# Patient Record
Sex: FEMALE | Race: WHITE
[De-identification: names, ages, dates, MRNs, and addresses within clinical notes are randomized per-mention and may not be internally consistent; named-entity substitution may affect disease eponyms.]

---

## 2017-02-21 ENCOUNTER — PRIOR ORIGINAL RECORDS (OUTPATIENT)
Dept: OTHER | Age: 82
End: 2017-02-21

## 2017-03-13 ENCOUNTER — MYAURORA ACCOUNT LINK (OUTPATIENT)
Dept: OTHER | Age: 82
End: 2017-03-13

## 2017-03-13 ENCOUNTER — HOSPITAL ENCOUNTER (OUTPATIENT)
Dept: CARDIOLOGY CLINIC | Facility: HOSPITAL | Age: 82
Discharge: HOME | End: 2017-03-13
Attending: INTERNAL MEDICINE

## 2017-03-13 DIAGNOSIS — I65.23 BILATERAL CAROTID ARTERY STENOSIS: ICD-10-CM

## 2017-06-07 ENCOUNTER — PRIOR ORIGINAL RECORDS (OUTPATIENT)
Dept: OTHER | Age: 82
End: 2017-06-07

## 2017-06-29 LAB
ALBUMIN: 4.1 G/DL
ALKALINE PHOSPHATATE(ALK PHOS): 80 IU/L
BILIRUBIN TOTAL: 0.42 MG/DL
BUN: 18 MG/DL
CALCIUM: 9.2 MG/DL
CHLORIDE: 106 MEQ/L
CHOLESTEROL, TOTAL: 186 MG/DL
CREATININE, SERUM: 0.87 MG/DL
FREE T4: 1.16 MG/DL
FREE T4: 1.16 MG/DL
GLUCOSE: 88 MG/DL
HDL CHOLESTEROL: 59 MG/DL
HEMATOCRIT: 46.5 %
HEMOGLOBIN A1C: 5.5 %
HEMOGLOBIN: 15.1 G/DL
LDL CHOLESTEROL: 111 MG/DL
PLATELETS: 229 K/UL
POTASSIUM, SERUM: 4.6 MEQ/L
PROTEIN, TOTAL: 7.1 G/DL
RED BLOOD COUNT: 5.42 X 10-6/U
SGOT (AST): 13 IU/L
SGPT (ALT): 28 IU/L
SODIUM: 145 MEQ/L
THYROID STIMULATING HORMONE: 2.45 MLU/L
TRIGLYCERIDES: 79 MG/DL
VITAMIN D 25-OH: 39.65 NG/ML
WHITE BLOOD COUNT: 4.87 X 10-3/U

## 2017-08-28 ENCOUNTER — HOSPITAL ENCOUNTER (EMERGENCY)
Facility: HOSPITAL | Age: 82
Discharge: HOME | End: 2017-08-28
Attending: EMERGENCY MEDICINE
Payer: MEDICARE

## 2017-08-28 ENCOUNTER — APPOINTMENT (OUTPATIENT)
Dept: GENERAL RADIOLOGY | Facility: HOSPITAL | Age: 82
End: 2017-08-28
Attending: EMERGENCY MEDICINE
Payer: MEDICARE

## 2017-08-28 ENCOUNTER — PRIOR ORIGINAL RECORDS (OUTPATIENT)
Dept: OTHER | Age: 82
End: 2017-08-28

## 2017-08-28 VITALS
RESPIRATION RATE: 16 BRPM | SYSTOLIC BLOOD PRESSURE: 119 MMHG | DIASTOLIC BLOOD PRESSURE: 68 MMHG | HEIGHT: 61.5 IN | OXYGEN SATURATION: 97 % | HEART RATE: 70 BPM | BODY MASS INDEX: 27.03 KG/M2 | WEIGHT: 145 LBS | TEMPERATURE: 97 F

## 2017-08-28 DIAGNOSIS — R53.83 FATIGUE, UNSPECIFIED TYPE: Primary | ICD-10-CM

## 2017-08-28 LAB
ALBUMIN SERPL-MCNC: 3.8 G/DL (ref 3.5–4.8)
ALP LIVER SERPL-CCNC: 76 U/L (ref 55–142)
ALT SERPL-CCNC: 25 U/L (ref 14–54)
AST SERPL-CCNC: 9 U/L (ref 15–41)
BASOPHILS # BLD AUTO: 0.06 X10(3) UL (ref 0–0.1)
BASOPHILS NFR BLD AUTO: 0.8 %
BILIRUB SERPL-MCNC: 0.3 MG/DL (ref 0.1–2)
BILIRUB UR QL STRIP.AUTO: NEGATIVE
BUN BLD-MCNC: 16 MG/DL (ref 8–20)
CALCIUM BLD-MCNC: 9.2 MG/DL (ref 8.3–10.3)
CHLORIDE: 109 MMOL/L (ref 101–111)
CLARITY UR REFRACT.AUTO: CLEAR
CO2: 28 MMOL/L (ref 22–32)
COLOR UR AUTO: YELLOW
CREAT BLD-MCNC: 0.73 MG/DL (ref 0.55–1.02)
D-DIMER: 0.46 UG/ML FEU (ref 0–0.49)
EOSINOPHIL # BLD AUTO: 0.23 X10(3) UL (ref 0–0.3)
EOSINOPHIL NFR BLD AUTO: 3.3 %
ERYTHROCYTE [DISTWIDTH] IN BLOOD BY AUTOMATED COUNT: 14.1 % (ref 11.5–16)
GLUCOSE BLD-MCNC: 93 MG/DL (ref 70–99)
GLUCOSE UR STRIP.AUTO-MCNC: NEGATIVE MG/DL
HCT VFR BLD AUTO: 45.2 % (ref 34–50)
HGB BLD-MCNC: 14.6 G/DL (ref 12–16)
IMMATURE GRANULOCYTE COUNT: 0.03 X10(3) UL (ref 0–1)
IMMATURE GRANULOCYTE RATIO %: 0.4 %
KETONES UR STRIP.AUTO-MCNC: NEGATIVE MG/DL
LYMPHOCYTES # BLD AUTO: 1.53 X10(3) UL (ref 0.9–4)
LYMPHOCYTES NFR BLD AUTO: 21.6 %
M PROTEIN MFR SERPL ELPH: 7.3 G/DL (ref 6.1–8.3)
MCH RBC QN AUTO: 27.7 PG (ref 27–33.2)
MCHC RBC AUTO-ENTMCNC: 32.3 G/DL (ref 31–37)
MCV RBC AUTO: 85.6 FL (ref 81–100)
MONOCYTES # BLD AUTO: 0.63 X10(3) UL (ref 0.1–0.6)
MONOCYTES NFR BLD AUTO: 8.9 %
NEUTROPHIL ABS PRELIM: 4.59 X10 (3) UL (ref 1.3–6.7)
NEUTROPHILS # BLD AUTO: 4.59 X10(3) UL (ref 1.3–6.7)
NEUTROPHILS NFR BLD AUTO: 65 %
NITRITE UR QL STRIP.AUTO: NEGATIVE
PH UR STRIP.AUTO: 7 [PH] (ref 4.5–8)
PLATELET # BLD AUTO: 212 10(3)UL (ref 150–450)
POTASSIUM SERPL-SCNC: 4 MMOL/L (ref 3.6–5.1)
PRO-BETA NATRIURETIC PEPTIDE: 112 PG/ML (ref ?–450)
PROT UR STRIP.AUTO-MCNC: NEGATIVE MG/DL
RBC # BLD AUTO: 5.28 X10(6)UL (ref 3.8–5.1)
RBC UR QL AUTO: NEGATIVE
RED CELL DISTRIBUTION WIDTH-SD: 43.5 FL (ref 35.1–46.3)
SODIUM SERPL-SCNC: 142 MMOL/L (ref 136–144)
SP GR UR STRIP.AUTO: 1.01 (ref 1–1.03)
TROPONIN: <0.046 NG/ML (ref ?–0.05)
TSI SER-ACNC: 1.94 MIU/ML (ref 0.35–5.5)
UROBILINOGEN UR STRIP.AUTO-MCNC: <2 MG/DL
WBC # BLD AUTO: 7.1 X10(3) UL (ref 4–13)

## 2017-08-28 PROCEDURE — 93005 ELECTROCARDIOGRAM TRACING: CPT

## 2017-08-28 PROCEDURE — 85025 COMPLETE CBC W/AUTO DIFF WBC: CPT | Performed by: EMERGENCY MEDICINE

## 2017-08-28 PROCEDURE — 81001 URINALYSIS AUTO W/SCOPE: CPT | Performed by: EMERGENCY MEDICINE

## 2017-08-28 PROCEDURE — 80053 COMPREHEN METABOLIC PANEL: CPT | Performed by: EMERGENCY MEDICINE

## 2017-08-28 PROCEDURE — 99285 EMERGENCY DEPT VISIT HI MDM: CPT

## 2017-08-28 PROCEDURE — 71010 XR CHEST AP PORTABLE  (CPT=71010): CPT | Performed by: EMERGENCY MEDICINE

## 2017-08-28 PROCEDURE — 84484 ASSAY OF TROPONIN QUANT: CPT | Performed by: EMERGENCY MEDICINE

## 2017-08-28 PROCEDURE — 85378 FIBRIN DEGRADE SEMIQUANT: CPT | Performed by: EMERGENCY MEDICINE

## 2017-08-28 PROCEDURE — 84443 ASSAY THYROID STIM HORMONE: CPT | Performed by: EMERGENCY MEDICINE

## 2017-08-28 PROCEDURE — 93010 ELECTROCARDIOGRAM REPORT: CPT

## 2017-08-28 PROCEDURE — 96360 HYDRATION IV INFUSION INIT: CPT

## 2017-08-28 PROCEDURE — 83880 ASSAY OF NATRIURETIC PEPTIDE: CPT | Performed by: EMERGENCY MEDICINE

## 2017-08-28 NOTE — ED PROVIDER NOTES
Patient Seen in: [facility] Emergency Department    History   Patient presents with:  Fatigue (constitutional, neurologic)    Stated Complaint: fatigue since Friday    HPI    58-year-old with history of hypertension, high cholesterol, kidney stones, topically 2 (two) times daily as needed. Meclizine HCl (ANTIVERT) 25 MG Oral Tab,  Take 25 mg by mouth 3 (three) times daily as needed.    Atorvastatin Calcium (LIPITOR) 20 MG Oral Tab,  TAKE 1 TABLET BY MOUTH NIGHTLY   Calcium-Vitamin D (CALTRATE 600 PLU calf tenderness to palpation. Neuro: No facial droop. No dysarthria or aphasia. Patient moves all extremities.   Skin: warm and dry, no diaphoresis    ED Course     Labs Reviewed   COMP METABOLIC PANEL (14) - Abnormal; Notable for the following:        R pleural effusion. Heart and pulmonary vessels are normal caliber. Mediastinal contours are smooth. Mild apex left scoliosis. Osseous degenerative changes.  Mild deformities   from old right rib fractures.     =====  CONCLUSION:  Hyperinflation/COPD.      ==

## 2017-08-29 LAB
ATRIAL RATE: 71 BPM
P AXIS: 5 DEGREES
P-R INTERVAL: 146 MS
Q-T INTERVAL: 396 MS
QRS DURATION: 84 MS
QTC CALCULATION (BEZET): 430 MS
R AXIS: -57 DEGREES
T AXIS: 19 DEGREES
VENTRICULAR RATE: 71 BPM

## 2017-09-11 ENCOUNTER — PRIOR ORIGINAL RECORDS (OUTPATIENT)
Dept: OTHER | Age: 82
End: 2017-09-11

## 2017-09-13 ENCOUNTER — PRIOR ORIGINAL RECORDS (OUTPATIENT)
Dept: OTHER | Age: 82
End: 2017-09-13

## 2017-09-14 ENCOUNTER — HOSPITAL ENCOUNTER (OUTPATIENT)
Dept: CV DIAGNOSTICS | Facility: HOSPITAL | Age: 82
Discharge: HOME | End: 2017-09-14
Attending: INTERNAL MEDICINE
Payer: MEDICARE

## 2017-09-14 DIAGNOSIS — R00.2 HEART PALPITATIONS: ICD-10-CM

## 2017-09-14 PROCEDURE — 93226 XTRNL ECG REC<48 HR SCAN A/R: CPT | Performed by: INTERNAL MEDICINE

## 2017-09-14 PROCEDURE — 93225 XTRNL ECG REC<48 HRS REC: CPT | Performed by: INTERNAL MEDICINE

## 2017-09-14 PROCEDURE — 93227 XTRNL ECG REC<48 HR R&I: CPT | Performed by: INTERNAL MEDICINE

## 2017-10-03 ENCOUNTER — MYAURORA ACCOUNT LINK (OUTPATIENT)
Dept: OTHER | Age: 82
End: 2017-10-03

## 2017-10-03 ENCOUNTER — PRIOR ORIGINAL RECORDS (OUTPATIENT)
Dept: OTHER | Age: 82
End: 2017-10-03

## 2017-10-12 LAB
ALBUMIN: 3.8 G/DL
ALKALINE PHOSPHATATE(ALK PHOS): 76 IU/L
BILIRUBIN TOTAL: 0.3 MG/DL
BUN: 16 MG/DL
CALCIUM: 9.2 MG/DL
CHLORIDE: 109 MEQ/L
CREATININE, SERUM: 0.73 MG/DL
GLUCOSE: 93 MG/DL
HEMATOCRIT: 45.2 %
HEMOGLOBIN: 14.6 G/DL
PLATELETS: 212 K/UL
POTASSIUM, SERUM: 4 MEQ/L
PROBNP: 112 PG/ML
PROTEIN, TOTAL: 7.3 G/DL
RED BLOOD COUNT: 5.28 X 10-6/U
SGOT (AST): 9 IU/L
SGPT (ALT): 25 IU/L
SODIUM: 142 MEQ/L
THYROID STIMULATING HORMONE: 1.94 MLU/L
WHITE BLOOD COUNT: 7.1 X 10-3/U

## 2017-10-17 ENCOUNTER — HOSPITAL ENCOUNTER (OUTPATIENT)
Dept: MAMMOGRAPHY | Age: 82
Discharge: HOME | End: 2017-10-17
Attending: INTERNAL MEDICINE
Payer: MEDICARE

## 2017-10-17 DIAGNOSIS — Z12.31 ENCOUNTER FOR SCREENING MAMMOGRAM FOR MALIGNANT NEOPLASM OF BREAST: ICD-10-CM

## 2017-10-17 DIAGNOSIS — Z12.31 ENCOUNTER FOR SCREENING MAMMOGRAM FOR BREAST CANCER: ICD-10-CM

## 2017-10-17 PROCEDURE — 77067 SCR MAMMO BI INCL CAD: CPT | Performed by: INTERNAL MEDICINE

## 2017-10-17 PROCEDURE — 77063 BREAST TOMOSYNTHESIS BI: CPT | Performed by: INTERNAL MEDICINE

## 2017-11-02 ENCOUNTER — HOSPITAL ENCOUNTER (OUTPATIENT)
Dept: CV DIAGNOSTICS | Facility: HOSPITAL | Age: 82
Discharge: HOME | End: 2017-11-02
Attending: INTERNAL MEDICINE

## 2017-11-02 ENCOUNTER — MYAURORA ACCOUNT LINK (OUTPATIENT)
Dept: OTHER | Age: 82
End: 2017-11-02

## 2017-11-02 DIAGNOSIS — R06.00 DYSPNEA, UNSPECIFIED TYPE: ICD-10-CM

## 2018-02-01 PROBLEM — Z78.0 POSTMENOPAUSAL STATUS: Status: ACTIVE | Noted: 2018-02-01

## 2018-06-08 ENCOUNTER — PRIOR ORIGINAL RECORDS (OUTPATIENT)
Dept: OTHER | Age: 83
End: 2018-06-08

## 2018-06-27 PROBLEM — Z79.899 ENCOUNTER FOR LONG-TERM (CURRENT) USE OF MEDICATIONS: Status: ACTIVE | Noted: 2018-06-27

## 2018-07-03 ENCOUNTER — PRIOR ORIGINAL RECORDS (OUTPATIENT)
Dept: OTHER | Age: 83
End: 2018-07-03

## 2018-07-09 ENCOUNTER — PRIOR ORIGINAL RECORDS (OUTPATIENT)
Dept: OTHER | Age: 83
End: 2018-07-09

## 2018-07-19 LAB
ALBUMIN: 3.8 G/DL
ALKALINE PHOSPHATATE(ALK PHOS): 74 IU/L
BILIRUBIN TOTAL: 0.47 MG/DL
BUN: 23 MG/DL
CALCIUM: 8.7 MG/DL
CHLORIDE: 105 MEQ/L
CHOLESTEROL, TOTAL: 194 MG/DL
CREATININE, SERUM: 0.78 MG/DL
FREE T4: 1.22 MG/DL
GLUCOSE: 90 MG/DL
HDL CHOLESTEROL: 58 MG/DL
HEMATOCRIT: 46.4 %
HEMOGLOBIN A1C: 5.6 %
HEMOGLOBIN: 15 G/DL
LDL CHOLESTEROL: 116 MG/DL
PLATELETS: 229 K/UL
POTASSIUM, SERUM: 4.5 MEQ/L
PROTEIN, TOTAL: 6.8 G/DL
RED BLOOD COUNT: 5.36 X 10-6/U
SGOT (AST): 17 IU/L
SGPT (ALT): 26 IU/L
SODIUM: 144 MEQ/L
THYROID STIMULATING HORMONE: 3.42 MLU/L
TRIGLYCERIDES: 102 MG/DL
WHITE BLOOD COUNT: 5.45 X 10-3/U

## 2018-08-14 ENCOUNTER — HOSPITAL ENCOUNTER (OUTPATIENT)
Dept: CV DIAGNOSTICS | Facility: HOSPITAL | Age: 83
Discharge: HOME | End: 2018-08-14
Attending: INTERNAL MEDICINE
Payer: MEDICARE

## 2018-08-14 DIAGNOSIS — R06.00 DYSPNEA, UNSPECIFIED: ICD-10-CM

## 2018-08-14 DIAGNOSIS — R53.83 OTHER FATIGUE: ICD-10-CM

## 2018-08-14 DIAGNOSIS — I65.23 BILATERAL CAROTID ARTERY STENOSIS: ICD-10-CM

## 2018-08-14 PROCEDURE — 93018 CV STRESS TEST I&R ONLY: CPT | Performed by: INTERNAL MEDICINE

## 2018-08-14 PROCEDURE — 93350 STRESS TTE ONLY: CPT | Performed by: INTERNAL MEDICINE

## 2018-08-14 PROCEDURE — 93017 CV STRESS TEST TRACING ONLY: CPT | Performed by: INTERNAL MEDICINE

## 2018-08-26 PROBLEM — M54.2 NECK PAIN: Status: ACTIVE | Noted: 2018-08-26

## 2018-10-18 ENCOUNTER — HOSPITAL ENCOUNTER (OUTPATIENT)
Dept: MAMMOGRAPHY | Age: 83
Discharge: HOME | End: 2018-10-18
Attending: INTERNAL MEDICINE
Payer: MEDICARE

## 2018-10-18 DIAGNOSIS — Z12.39 SCREENING FOR BREAST CANCER: ICD-10-CM

## 2018-10-18 PROCEDURE — 77067 SCR MAMMO BI INCL CAD: CPT | Performed by: INTERNAL MEDICINE

## 2018-10-18 PROCEDURE — 77063 BREAST TOMOSYNTHESIS BI: CPT | Performed by: INTERNAL MEDICINE

## 2019-01-04 ENCOUNTER — HOSPITAL ENCOUNTER (OUTPATIENT)
Dept: LAB | Facility: HOSPITAL | Age: 84
Discharge: HOME | End: 2019-01-04
Attending: INTERNAL MEDICINE
Payer: MEDICARE

## 2019-01-04 ENCOUNTER — PRIOR ORIGINAL RECORDS (OUTPATIENT)
Dept: OTHER | Age: 84
End: 2019-01-04

## 2019-01-04 LAB
ALBUMIN SERPL-MCNC: 3.9 G/DL (ref 3.1–4.5)
ALBUMIN/GLOB SERPL: 1.1 {RATIO} (ref 1–2)
ALP LIVER SERPL-CCNC: 81 U/L (ref 55–142)
ALT SERPL-CCNC: 30 U/L (ref 14–54)
ANION GAP SERPL CALC-SCNC: 4 MMOL/L (ref 0–18)
AST SERPL-CCNC: 15 U/L (ref 15–41)
BILIRUB SERPL-MCNC: 0.6 MG/DL (ref 0.1–2)
BUN BLD-MCNC: 20 MG/DL (ref 8–20)
BUN/CREAT SERPL: 23.3 (ref 10–20)
CALCIUM BLD-MCNC: 8.9 MG/DL (ref 8.3–10.3)
CHLORIDE SERPL-SCNC: 107 MMOL/L (ref 101–111)
CHOLEST SMN-MCNC: 215 MG/DL (ref ?–200)
CO2 SERPL-SCNC: 29 MMOL/L (ref 22–32)
CREAT BLD-MCNC: 0.86 MG/DL (ref 0.55–1.02)
EST. AVERAGE GLUCOSE BLD GHB EST-MCNC: 120 MG/DL (ref 68–126)
GLOBULIN PLAS-MCNC: 3.4 G/DL (ref 2.8–4.4)
GLUCOSE BLD-MCNC: 101 MG/DL (ref 70–99)
HBA1C MFR BLD HPLC: 5.8 % (ref ?–5.7)
HDLC SERPL-MCNC: 59 MG/DL (ref 40–59)
LDLC SERPL CALC-MCNC: 130 MG/DL (ref ?–100)
M PROTEIN MFR SERPL ELPH: 7.3 G/DL (ref 6.4–8.2)
NONHDLC SERPL-MCNC: 156 MG/DL (ref ?–130)
OSMOLALITY SERPL CALC.SUM OF ELEC: 293 MOSM/KG (ref 275–295)
POTASSIUM SERPL-SCNC: 3.8 MMOL/L (ref 3.6–5.1)
SODIUM SERPL-SCNC: 140 MMOL/L (ref 136–144)
T4 FREE SERPL-MCNC: 1 NG/DL (ref 0.9–1.8)
TRIGL SERPL-MCNC: 129 MG/DL (ref 30–149)
TSI SER-ACNC: 3.08 MIU/ML (ref 0.35–5.5)
VLDLC SERPL CALC-MCNC: 26 MG/DL (ref 0–30)

## 2019-01-04 PROCEDURE — 80061 LIPID PANEL: CPT | Performed by: INTERNAL MEDICINE

## 2019-01-04 PROCEDURE — 84439 ASSAY OF FREE THYROXINE: CPT | Performed by: INTERNAL MEDICINE

## 2019-01-04 PROCEDURE — 36415 COLL VENOUS BLD VENIPUNCTURE: CPT | Performed by: INTERNAL MEDICINE

## 2019-01-04 PROCEDURE — 83036 HEMOGLOBIN GLYCOSYLATED A1C: CPT | Performed by: INTERNAL MEDICINE

## 2019-01-04 PROCEDURE — 80053 COMPREHEN METABOLIC PANEL: CPT | Performed by: INTERNAL MEDICINE

## 2019-01-04 PROCEDURE — 84443 ASSAY THYROID STIM HORMONE: CPT | Performed by: INTERNAL MEDICINE

## 2019-01-15 ENCOUNTER — MYAURORA ACCOUNT LINK (OUTPATIENT)
Dept: OTHER | Age: 84
End: 2019-01-15

## 2019-01-15 ENCOUNTER — PRIOR ORIGINAL RECORDS (OUTPATIENT)
Dept: OTHER | Age: 84
End: 2019-01-15

## 2019-02-18 LAB
ALBUMIN: 3.9 G/DL
ALKALINE PHOSPHATATE(ALK PHOS): 81 IU/L
BILIRUBIN TOTAL: 0.6 MG/DL
BUN: 20 MG/DL
CALCIUM: 8.9 MG/DL
CHLORIDE: 107 MEQ/L
CHOLESTEROL, TOTAL: 215 MG/DL
CREATININE, SERUM: 0.86 MG/DL
FREE T4: 1 MG/DL
GLOBULIN: 3.4 G/DL
GLUCOSE: 101 MG/DL
HDL CHOLESTEROL: 59 MG/DL
HEMOGLOBIN A1C: 5.8 %
LDL CHOLESTEROL: 130 MG/DL
POTASSIUM, SERUM: 3.8 MEQ/L
PROTEIN, TOTAL: 7.3 G/DL
SGOT (AST): 15 IU/L
SGPT (ALT): 30 IU/L
SODIUM: 140 MEQ/L
THYROID STIMULATING HORMONE: 3.08 MLU/L
TRIGLYCERIDES: 129 MG/DL

## 2019-02-28 VITALS
DIASTOLIC BLOOD PRESSURE: 58 MMHG | HEART RATE: 74 BPM | HEIGHT: 60 IN | SYSTOLIC BLOOD PRESSURE: 120 MMHG | WEIGHT: 147 LBS | BODY MASS INDEX: 28.86 KG/M2

## 2019-02-28 VITALS
DIASTOLIC BLOOD PRESSURE: 62 MMHG | HEART RATE: 81 BPM | WEIGHT: 146 LBS | SYSTOLIC BLOOD PRESSURE: 112 MMHG | BODY MASS INDEX: 28.66 KG/M2 | HEIGHT: 60 IN

## 2019-02-28 VITALS
SYSTOLIC BLOOD PRESSURE: 120 MMHG | DIASTOLIC BLOOD PRESSURE: 64 MMHG | HEART RATE: 60 BPM | BODY MASS INDEX: 28.27 KG/M2 | HEIGHT: 60 IN | WEIGHT: 144 LBS

## 2019-03-01 VITALS
HEIGHT: 60 IN | HEART RATE: 79 BPM | WEIGHT: 149 LBS | BODY MASS INDEX: 29.25 KG/M2 | DIASTOLIC BLOOD PRESSURE: 65 MMHG | SYSTOLIC BLOOD PRESSURE: 125 MMHG

## 2019-04-08 PROCEDURE — 87209 SMEAR COMPLEX STAIN: CPT | Performed by: INTERNAL MEDICINE

## 2019-04-08 PROCEDURE — 87045 FECES CULTURE AEROBIC BACT: CPT | Performed by: INTERNAL MEDICINE

## 2019-04-08 PROCEDURE — 87177 OVA AND PARASITES SMEARS: CPT | Performed by: INTERNAL MEDICINE

## 2019-04-08 PROCEDURE — 87046 STOOL CULTR AEROBIC BACT EA: CPT | Performed by: INTERNAL MEDICINE

## 2019-04-08 PROCEDURE — 87427 SHIGA-LIKE TOXIN AG IA: CPT | Performed by: INTERNAL MEDICINE

## 2019-04-08 PROCEDURE — 87272 CRYPTOSPORIDIUM AG IF: CPT | Performed by: INTERNAL MEDICINE

## 2019-04-08 PROCEDURE — 87329 GIARDIA AG IA: CPT | Performed by: INTERNAL MEDICINE

## 2019-04-22 RX ORDER — ATORVASTATIN CALCIUM 20 MG/1
TABLET, FILM COATED ORAL
COMMUNITY
Start: 2018-07-03

## 2019-04-22 RX ORDER — ASPIRIN 325 MG
TABLET ORAL
COMMUNITY

## 2019-04-22 RX ORDER — MULTIVITAMIN
CAPSULE ORAL
COMMUNITY

## 2019-04-22 RX ORDER — LEVOTHYROXINE SODIUM 0.05 MG/1
TABLET ORAL
COMMUNITY
Start: 2010-02-25

## 2019-07-01 PROBLEM — Z78.0 POSTMENOPAUSAL STATUS: Status: RESOLVED | Noted: 2018-02-01 | Resolved: 2019-07-01

## 2019-07-01 PROBLEM — E03.9 HYPOTHYROIDISM: Status: ACTIVE | Noted: 2019-07-01

## 2019-10-23 ENCOUNTER — HOSPITAL ENCOUNTER (OUTPATIENT)
Dept: MAMMOGRAPHY | Facility: HOSPITAL | Age: 84
Discharge: HOME | End: 2019-10-23
Attending: INTERNAL MEDICINE
Payer: MEDICARE

## 2019-10-23 DIAGNOSIS — Z12.39 SCREENING FOR BREAST CANCER: ICD-10-CM

## 2019-10-23 PROCEDURE — 77063 BREAST TOMOSYNTHESIS BI: CPT | Performed by: INTERNAL MEDICINE

## 2019-10-23 PROCEDURE — 77067 SCR MAMMO BI INCL CAD: CPT | Performed by: INTERNAL MEDICINE

## 2020-01-01 ENCOUNTER — EXTERNAL RECORD (OUTPATIENT)
Dept: HEALTH INFORMATION MANAGEMENT | Facility: OTHER | Age: 85
End: 2020-01-01

## 2020-01-02 ENCOUNTER — TELEPHONE (OUTPATIENT)
Dept: CARDIOLOGY | Age: 85
End: 2020-01-02

## 2020-01-02 DIAGNOSIS — R53.83 OTHER FATIGUE: ICD-10-CM

## 2020-01-02 DIAGNOSIS — E55.9 VITAMIN D DEFICIENCY: ICD-10-CM

## 2020-01-02 DIAGNOSIS — E78.5 HYPERLIPIDEMIA, UNSPECIFIED HYPERLIPIDEMIA TYPE: Primary | ICD-10-CM

## 2020-01-06 LAB
25(OH)D3+25(OH)D2 SERPL-MCNC: 43.3 NG/ML
ANION GAP SERPL CALC-SCNC: NORMAL MMOL/L
BUN SERPL-MCNC: 20 MG/DL
BUN/CREAT SERPL: NORMAL
CALCIUM SERPL-MCNC: 9.4 MG/DL
CHLORIDE SERPL-SCNC: 106 MMOL/L
CHOLEST SERPL-MCNC: 204 MG/DL
CHOLEST/HDLC SERPL: NORMAL {RATIO}
CO2 SERPL-SCNC: NORMAL MMOL/L
CREAT SERPL-MCNC: 0.83 MG/DL
GLUCOSE SERPL-MCNC: 97 MG/DL
HDLC SERPL-MCNC: 57 MG/DL
LDLC SERPL CALC-MCNC: 125 MG/DL
LENGTH OF FAST TIME PATIENT: NORMAL H
LENGTH OF FAST TIME PATIENT: NORMAL H
NONHDLC SERPL-MCNC: NORMAL MG/DL
POTASSIUM SERPL-SCNC: 4.4 MMOL/L
SODIUM SERPL-SCNC: 143 MMOL/L
TRIGL SERPL-MCNC: 111 MG/DL
VLDLC SERPL CALC-MCNC: NORMAL MG/DL

## 2020-01-07 ENCOUNTER — TELEPHONE (OUTPATIENT)
Dept: CARDIOLOGY | Age: 85
End: 2020-01-07

## 2020-01-13 ENCOUNTER — CLINICAL ABSTRACT (OUTPATIENT)
Dept: CARDIOLOGY | Age: 85
End: 2020-01-13

## 2020-01-20 PROBLEM — R53.82 CHRONIC FATIGUE AND MALAISE: Status: ACTIVE | Noted: 2017-10-03

## 2020-01-20 PROBLEM — R53.81 CHRONIC FATIGUE AND MALAISE: Status: ACTIVE | Noted: 2017-10-03

## 2020-01-21 ENCOUNTER — APPOINTMENT (OUTPATIENT)
Dept: CARDIOLOGY | Age: 85
End: 2020-01-21

## 2020-01-21 ENCOUNTER — OFFICE VISIT (OUTPATIENT)
Dept: CARDIOLOGY | Age: 85
End: 2020-01-21

## 2020-01-21 VITALS
DIASTOLIC BLOOD PRESSURE: 70 MMHG | HEIGHT: 61 IN | SYSTOLIC BLOOD PRESSURE: 122 MMHG | HEART RATE: 68 BPM | BODY MASS INDEX: 26.24 KG/M2 | WEIGHT: 139 LBS

## 2020-01-21 DIAGNOSIS — E78.00 PURE HYPERCHOLESTEROLEMIA: Primary | ICD-10-CM

## 2020-01-21 DIAGNOSIS — I65.23 ASYMPTOMATIC CAROTID ARTERY STENOSIS, BILATERAL: ICD-10-CM

## 2020-01-21 PROCEDURE — 99214 OFFICE O/P EST MOD 30 MIN: CPT | Performed by: NURSE PRACTITIONER

## 2020-01-21 ASSESSMENT — ENCOUNTER SYMPTOMS
HEMOPTYSIS: 0
COUGH: 0
BRUISES/BLEEDS EASILY: 0
WEIGHT GAIN: 0
HEMATOCHEZIA: 0
CHILLS: 0
ALLERGIC/IMMUNOLOGIC COMMENTS: NO NEW FOOD ALLERGIES
FEVER: 0
SUSPICIOUS LESIONS: 0
WEIGHT LOSS: 0

## 2020-01-21 ASSESSMENT — PATIENT HEALTH QUESTIONNAIRE - PHQ9
SUM OF ALL RESPONSES TO PHQ9 QUESTIONS 1 AND 2: 0
SUM OF ALL RESPONSES TO PHQ9 QUESTIONS 1 AND 2: 0
1. LITTLE INTEREST OR PLEASURE IN DOING THINGS: NOT AT ALL
2. FEELING DOWN, DEPRESSED OR HOPELESS: NOT AT ALL

## 2020-05-19 ENCOUNTER — TELEPHONE (OUTPATIENT)
Dept: CARDIOLOGY | Age: 85
End: 2020-05-19

## 2020-07-07 ENCOUNTER — HOSPITAL ENCOUNTER (OUTPATIENT)
Dept: LAB | Facility: HOSPITAL | Age: 85
Discharge: HOME | End: 2020-07-07
Attending: INTERNAL MEDICINE
Payer: MEDICARE

## 2020-07-07 LAB
ALBUMIN SERPL-MCNC: 3.6 G/DL
ALBUMIN SERPL-MCNC: 3.6 G/DL (ref 3.4–5)
ALBUMIN/GLOB SERPL: 1.1 {RATIO} (ref 1–2)
ALP LIVER SERPL-CCNC: 67 U/L (ref 55–142)
ALP SERPL-CCNC: 67 U/L
ALT SERPL-CCNC: 21 U/L (ref 13–56)
ALT SERPL-CCNC: 21 UNITS/L
ANION GAP SERPL CALC-SCNC: 5 MMOL/L (ref 0–18)
AST SERPL-CCNC: 15 U/L (ref 15–37)
AST SERPL-CCNC: 15 UNITS/L
BILIRUB SERPL-MCNC: 0.3 MG/DL
BILIRUB SERPL-MCNC: 0.3 MG/DL (ref 0.1–2)
BUN BLD-MCNC: 17 MG/DL (ref 7–18)
BUN SERPL-MCNC: 17 MG/DL
BUN/CREAT SERPL: 20.5 (ref 10–20)
CALCIUM BLD-MCNC: 9 MG/DL (ref 8.5–10.1)
CALCIUM SERPL-MCNC: 9 MG/DL
CHLORIDE SERPL-SCNC: 109 MMOL/L
CHLORIDE SERPL-SCNC: 109 MMOL/L (ref 98–112)
CHOLEST SERPL-MCNC: 189 MG/DL
CHOLEST SMN-MCNC: 189 MG/DL (ref ?–200)
CO2 SERPL-SCNC: 27 MMOL/L (ref 21–32)
CREAT BLD-MCNC: 0.83 MG/DL (ref 0.55–1.02)
CREAT SERPL-MCNC: 0.83 MG/DL
GLOBULIN PLAS-MCNC: 3.3 G/DL (ref 2.8–4.4)
GLOBULIN SER-MCNC: 3.3 G/DL
GLUCOSE BLD-MCNC: 102 MG/DL (ref 70–99)
GLUCOSE SERPL-MCNC: 102 MG/DL
HDLC SERPL-MCNC: 51 MG/DL
HDLC SERPL-MCNC: 51 MG/DL (ref 40–59)
LDLC SERPL CALC-MCNC: 118 MG/DL
LDLC SERPL CALC-MCNC: 118 MG/DL (ref ?–100)
M PROTEIN MFR SERPL ELPH: 6.9 G/DL (ref 6.4–8.2)
NONHDLC SERPL-MCNC: 138 MG/DL (ref ?–130)
OSMOLALITY SERPL CALC.SUM OF ELEC: 294 MOSM/KG (ref 275–295)
PATIENT FASTING Y/N/NP: YES
PATIENT FASTING Y/N/NP: YES
POTASSIUM SERPL-SCNC: 4.1 MMOL/L
POTASSIUM SERPL-SCNC: 4.1 MMOL/L (ref 3.5–5.1)
PROT SERPL-MCNC: 6.9 G/DL
SODIUM SERPL-SCNC: 141 MMOL/L
SODIUM SERPL-SCNC: 141 MMOL/L (ref 136–145)
TRIGL SERPL-MCNC: 100 MG/DL
TRIGL SERPL-MCNC: 100 MG/DL (ref 30–149)
VLDLC SERPL CALC-MCNC: 20 MG/DL (ref 0–30)

## 2020-07-07 PROCEDURE — 36415 COLL VENOUS BLD VENIPUNCTURE: CPT | Performed by: INTERNAL MEDICINE

## 2020-07-07 PROCEDURE — 80053 COMPREHEN METABOLIC PANEL: CPT | Performed by: INTERNAL MEDICINE

## 2020-07-07 PROCEDURE — 80061 LIPID PANEL: CPT | Performed by: INTERNAL MEDICINE

## 2020-07-08 ENCOUNTER — DOCUMENTATION (OUTPATIENT)
Dept: CARDIOLOGY | Age: 85
End: 2020-07-08

## 2020-07-09 ENCOUNTER — CLINICAL ABSTRACT (OUTPATIENT)
Dept: CARDIOLOGY | Age: 85
End: 2020-07-09

## 2020-07-14 ENCOUNTER — APPOINTMENT (OUTPATIENT)
Dept: MRI IMAGING | Facility: HOSPITAL | Age: 85
End: 2020-07-14
Attending: EMERGENCY MEDICINE
Payer: MEDICARE

## 2020-07-14 ENCOUNTER — HOSPITAL ENCOUNTER (EMERGENCY)
Facility: HOSPITAL | Age: 85
Discharge: HOME | End: 2020-07-14
Attending: EMERGENCY MEDICINE
Payer: MEDICARE

## 2020-07-14 ENCOUNTER — HOSPITAL ENCOUNTER (OUTPATIENT)
Age: 85
Discharge: ANOTHER HOSPITAL | End: 2020-07-14
Payer: MEDICARE

## 2020-07-14 VITALS
HEART RATE: 86 BPM | RESPIRATION RATE: 18 BRPM | DIASTOLIC BLOOD PRESSURE: 87 MMHG | OXYGEN SATURATION: 97 % | BODY MASS INDEX: 26 KG/M2 | TEMPERATURE: 98 F | SYSTOLIC BLOOD PRESSURE: 152 MMHG | WEIGHT: 138.88 LBS

## 2020-07-14 VITALS
OXYGEN SATURATION: 97 % | SYSTOLIC BLOOD PRESSURE: 162 MMHG | BODY MASS INDEX: 25.68 KG/M2 | DIASTOLIC BLOOD PRESSURE: 80 MMHG | HEIGHT: 61 IN | RESPIRATION RATE: 16 BRPM | TEMPERATURE: 98 F | HEART RATE: 65 BPM | WEIGHT: 136 LBS

## 2020-07-14 DIAGNOSIS — H81.10 BENIGN PAROXYSMAL POSITIONAL VERTIGO, UNSPECIFIED LATERALITY: Primary | ICD-10-CM

## 2020-07-14 DIAGNOSIS — R42 DIZZINESS: Primary | ICD-10-CM

## 2020-07-14 LAB
ALBUMIN SERPL-MCNC: 3.8 G/DL (ref 3.4–5)
ALBUMIN/GLOB SERPL: 1.2 {RATIO} (ref 1–2)
ALP LIVER SERPL-CCNC: 73 U/L (ref 55–142)
ALT SERPL-CCNC: 23 U/L (ref 13–56)
ANION GAP SERPL CALC-SCNC: 0 MMOL/L (ref 0–18)
APTT PPP: 25.6 SECONDS (ref 25.4–36.1)
AST SERPL-CCNC: 9 U/L (ref 15–37)
ATRIAL RATE: 83 BPM
BASOPHILS # BLD AUTO: 0.05 X10(3) UL (ref 0–0.2)
BASOPHILS NFR BLD AUTO: 0.7 %
BILIRUB SERPL-MCNC: 0.3 MG/DL (ref 0.1–2)
BUN BLD-MCNC: 14 MG/DL (ref 7–18)
BUN/CREAT SERPL: 18.7 (ref 10–20)
CALCIUM BLD-MCNC: 9.2 MG/DL (ref 8.5–10.1)
CHLORIDE SERPL-SCNC: 112 MMOL/L (ref 98–112)
CO2 SERPL-SCNC: 28 MMOL/L (ref 21–32)
CREAT BLD-MCNC: 0.75 MG/DL (ref 0.55–1.02)
DEPRECATED RDW RBC AUTO: 43.2 FL (ref 35.1–46.3)
EOSINOPHIL # BLD AUTO: 0.14 X10(3) UL (ref 0–0.7)
EOSINOPHIL NFR BLD AUTO: 2 %
ERYTHROCYTE [DISTWIDTH] IN BLOOD BY AUTOMATED COUNT: 13.8 % (ref 11–15)
GLOBULIN PLAS-MCNC: 3.3 G/DL (ref 2.8–4.4)
GLUCOSE BLD-MCNC: 102 MG/DL (ref 70–99)
HCT VFR BLD AUTO: 47.3 % (ref 35–48)
HGB BLD-MCNC: 15.5 G/DL (ref 12–16)
IMM GRANULOCYTES # BLD AUTO: 0.02 X10(3) UL (ref 0–1)
IMM GRANULOCYTES NFR BLD: 0.3 %
INR BLD: 0.99 (ref 0.89–1.11)
LYMPHOCYTES # BLD AUTO: 1.2 X10(3) UL (ref 1–4)
LYMPHOCYTES NFR BLD AUTO: 17.4 %
M PROTEIN MFR SERPL ELPH: 7.1 G/DL (ref 6.4–8.2)
MCH RBC QN AUTO: 28 PG (ref 26–34)
MCHC RBC AUTO-ENTMCNC: 32.8 G/DL (ref 31–37)
MCV RBC AUTO: 85.5 FL (ref 80–100)
MONOCYTES # BLD AUTO: 0.63 X10(3) UL (ref 0.1–1)
MONOCYTES NFR BLD AUTO: 9.1 %
NEUTROPHILS # BLD AUTO: 4.85 X10 (3) UL (ref 1.5–7.7)
NEUTROPHILS # BLD AUTO: 4.85 X10(3) UL (ref 1.5–7.7)
NEUTROPHILS NFR BLD AUTO: 70.5 %
OSMOLALITY SERPL CALC.SUM OF ELEC: 291 MOSM/KG (ref 275–295)
P AXIS: 68 DEGREES
P-R INTERVAL: 170 MS
PLATELET # BLD AUTO: 222 10(3)UL (ref 150–450)
POTASSIUM SERPL-SCNC: 4 MMOL/L (ref 3.5–5.1)
PSA SERPL DL<=0.01 NG/ML-MCNC: 13.4 SECONDS (ref 12.4–14.6)
Q-T INTERVAL: 402 MS
QRS DURATION: 126 MS
QTC CALCULATION (BEZET): 472 MS
R AXIS: -63 DEGREES
RBC # BLD AUTO: 5.53 X10(6)UL (ref 3.8–5.3)
SODIUM SERPL-SCNC: 140 MMOL/L (ref 136–145)
T AXIS: 71 DEGREES
TROPONIN I SERPL-MCNC: <0.045 NG/ML (ref ?–0.04)
VENTRICULAR RATE: 83 BPM
WBC # BLD AUTO: 6.9 X10(3) UL (ref 4–11)

## 2020-07-14 PROCEDURE — 70553 MRI BRAIN STEM W/O & W/DYE: CPT | Performed by: EMERGENCY MEDICINE

## 2020-07-14 PROCEDURE — 70546 MR ANGIOGRAPH HEAD W/O&W/DYE: CPT | Performed by: EMERGENCY MEDICINE

## 2020-07-14 PROCEDURE — 99205 OFFICE O/P NEW HI 60 MIN: CPT | Performed by: PHYSICIAN ASSISTANT

## 2020-07-14 PROCEDURE — 93000 ELECTROCARDIOGRAM COMPLETE: CPT | Performed by: PHYSICIAN ASSISTANT

## 2020-07-14 PROCEDURE — 84484 ASSAY OF TROPONIN QUANT: CPT | Performed by: EMERGENCY MEDICINE

## 2020-07-14 PROCEDURE — 85730 THROMBOPLASTIN TIME PARTIAL: CPT | Performed by: EMERGENCY MEDICINE

## 2020-07-14 PROCEDURE — 80053 COMPREHEN METABOLIC PANEL: CPT | Performed by: EMERGENCY MEDICINE

## 2020-07-14 PROCEDURE — 99285 EMERGENCY DEPT VISIT HI MDM: CPT

## 2020-07-14 PROCEDURE — 85025 COMPLETE CBC W/AUTO DIFF WBC: CPT | Performed by: EMERGENCY MEDICINE

## 2020-07-14 PROCEDURE — 85610 PROTHROMBIN TIME: CPT | Performed by: EMERGENCY MEDICINE

## 2020-07-14 PROCEDURE — A9575 INJ GADOTERATE MEGLUMI 0.1ML: HCPCS | Performed by: EMERGENCY MEDICINE

## 2020-07-14 PROCEDURE — 36415 COLL VENOUS BLD VENIPUNCTURE: CPT

## 2020-07-14 PROCEDURE — 99284 EMERGENCY DEPT VISIT MOD MDM: CPT

## 2020-07-14 PROCEDURE — 70549 MR ANGIOGRAPH NECK W/O&W/DYE: CPT | Performed by: EMERGENCY MEDICINE

## 2020-07-14 RX ORDER — SODIUM CHLORIDE 9 MG/ML
INJECTION, SOLUTION INTRAVENOUS ONCE
Status: COMPLETED | OUTPATIENT
Start: 2020-07-14 | End: 2020-07-14

## 2020-07-14 RX ORDER — MECLIZINE HYDROCHLORIDE 25 MG/1
25 TABLET ORAL ONCE
Status: COMPLETED | OUTPATIENT
Start: 2020-07-14 | End: 2020-07-14

## 2020-07-14 RX ORDER — MECLIZINE HYDROCHLORIDE 25 MG/1
25 TABLET ORAL 3 TIMES DAILY PRN
Qty: 15 TABLET | Refills: 0 | Status: SHIPPED | OUTPATIENT
Start: 2020-07-14 | End: 2020-07-20

## 2020-07-14 NOTE — ED INITIAL ASSESSMENT (HPI)
PT seen at [facility] for dizziness and was referred to ER for further work up. Denies chest pain, shortness of breath or any other symptoms.

## 2020-07-14 NOTE — ED PROVIDER NOTES
Patient Seen in: [address] [facility]      History   Patient presents with:  Dizziness    Stated Complaint: Dizzy, light Imani Teresa is an 42-year-old female who presents for evaluation of dizziness.   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 Physical Exam  Nursing note reviewed. Vital signs reviewed. General: Patient is A&O x 4; Normal gait; normal speech  Neuro: Cranial nerves III - XII grossly intact. Extremity strength is 5/5 and equal bilaterally.  Sensation is eq diagnoses in the immediate care today and she definitely warrants ER work-up. Family is in agreement. I recommend ambulance transfer, but the patient politely declined stating that she is worried about the bill associated with an ambulance transfer.   I e

## 2020-07-14 NOTE — ED INITIAL ASSESSMENT (HPI)
Sudden onset of dizziness. States does not recall if happened while laying down or if she layed down and became dizzy. Denies pain or SOB. Presently denies dizziness or any other s/s.

## 2020-07-14 NOTE — ED PROVIDER NOTES
Patient Seen in: [facility] Emergency Department      History   Patient presents with:  Dizziness    Stated Complaint: [facility]IC to ED - Vertigo    HPI    Patient is a 80-year-old female presents emergency room with a history of several episodes of dizzi insertion of intraocular lens - [facility]. [place]] 7/30/2010   • S/P Colonoscopy & LGI (1/07) not to be rechecked as risk > benefit - ELMER Chance [2/17] 6/8/2016   • S/P Subtotal Thyroidectomy - Dr. Kade Colon 7/30/2010   • S/P [7/15] cholecystectomy auscultation bilaterally with no wheeze. There is good equal air entry bilaterally. HEART: Regular rate and rhythm. Normal S1, S2 no S3, or S4. No murmur. ABDOMEN: There is no focal tenderness to palpation appreciated anywhere throughout the abdomen.  The feeling better at this time patient with no other new complaints this time patient up and [facility] emergency with steady gait no ataxia. Patient wants to go home. Will discharge home at this time.       Patient had an IV line established blood work drawn i found. Please discuss with provider.

## 2020-07-20 PROBLEM — H81.13 BENIGN PAROXYSMAL POSITIONAL VERTIGO DUE TO BILATERAL VESTIBULAR DISORDER: Status: ACTIVE | Noted: 2020-07-20

## 2020-07-24 RX ORDER — MECLIZINE HYDROCHLORIDE 25 MG/1
25 TABLET ORAL
COMMUNITY
Start: 2020-07-20

## 2020-07-28 ENCOUNTER — OFFICE VISIT (OUTPATIENT)
Dept: CARDIOLOGY | Age: 85
End: 2020-07-28

## 2020-07-28 VITALS
BODY MASS INDEX: 25.49 KG/M2 | SYSTOLIC BLOOD PRESSURE: 110 MMHG | DIASTOLIC BLOOD PRESSURE: 60 MMHG | WEIGHT: 135 LBS | HEART RATE: 96 BPM | HEIGHT: 61 IN

## 2020-07-28 DIAGNOSIS — R53.82 CHRONIC FATIGUE AND MALAISE: ICD-10-CM

## 2020-07-28 DIAGNOSIS — E78.00 PURE HYPERCHOLESTEROLEMIA: ICD-10-CM

## 2020-07-28 DIAGNOSIS — R53.81 CHRONIC FATIGUE AND MALAISE: ICD-10-CM

## 2020-07-28 DIAGNOSIS — I65.23 ASYMPTOMATIC CAROTID ARTERY STENOSIS, BILATERAL: Primary | ICD-10-CM

## 2020-07-28 DIAGNOSIS — R42 VERTIGO: ICD-10-CM

## 2020-07-28 DIAGNOSIS — I44.7 LBBB (LEFT BUNDLE BRANCH BLOCK): ICD-10-CM

## 2020-07-28 PROCEDURE — 99215 OFFICE O/P EST HI 40 MIN: CPT | Performed by: INTERNAL MEDICINE

## 2020-07-28 ASSESSMENT — PATIENT HEALTH QUESTIONNAIRE - PHQ9
SUM OF ALL RESPONSES TO PHQ9 QUESTIONS 1 AND 2: 0
SUM OF ALL RESPONSES TO PHQ9 QUESTIONS 1 AND 2: 0
2. FEELING DOWN, DEPRESSED OR HOPELESS: NOT AT ALL
CLINICAL INTERPRETATION OF PHQ9 SCORE: NO FURTHER SCREENING NEEDED
1. LITTLE INTEREST OR PLEASURE IN DOING THINGS: NOT AT ALL
CLINICAL INTERPRETATION OF PHQ2 SCORE: NO FURTHER SCREENING NEEDED

## 2020-07-31 ENCOUNTER — HOSPITAL ENCOUNTER (OUTPATIENT)
Dept: CARDIOLOGY CLINIC | Facility: HOSPITAL | Age: 85
Discharge: HOME | End: 2020-07-31
Attending: INTERNAL MEDICINE
Payer: MEDICARE

## 2020-07-31 DIAGNOSIS — R42 VERTIGO: ICD-10-CM

## 2020-07-31 DIAGNOSIS — I65.23 ASYMPTOMATIC CAROTID ARTERY STENOSIS, BILATERAL: ICD-10-CM

## 2020-07-31 DIAGNOSIS — E78.00 PURE HYPERCHOLESTEROLEMIA: ICD-10-CM

## 2020-07-31 PROCEDURE — 93880 EXTRACRANIAL BILAT STUDY: CPT | Performed by: INTERNAL MEDICINE

## 2020-08-05 ENCOUNTER — HOSPITAL ENCOUNTER (OUTPATIENT)
Dept: CV DIAGNOSTICS | Facility: HOSPITAL | Age: 85
Discharge: HOME | End: 2020-08-05
Attending: INTERNAL MEDICINE
Payer: MEDICARE

## 2020-08-05 DIAGNOSIS — I65.23 ASYMPTOMATIC CAROTID ARTERY STENOSIS, BILATERAL: ICD-10-CM

## 2020-08-05 DIAGNOSIS — E78.00 PURE HYPERCHOLESTEROLEMIA: ICD-10-CM

## 2020-08-05 DIAGNOSIS — R53.81 CHRONIC FATIGUE AND MALAISE: ICD-10-CM

## 2020-08-05 DIAGNOSIS — I44.7 LBBB (LEFT BUNDLE BRANCH BLOCK): ICD-10-CM

## 2020-08-05 DIAGNOSIS — R53.82 CHRONIC FATIGUE AND MALAISE: ICD-10-CM

## 2020-08-05 PROCEDURE — 93306 TTE W/DOPPLER COMPLETE: CPT | Performed by: INTERNAL MEDICINE

## 2020-08-11 PROBLEM — R53.81 CHRONIC FATIGUE AND MALAISE: Status: ACTIVE | Noted: 2017-10-03

## 2020-08-11 PROBLEM — I44.7 LBBB (LEFT BUNDLE BRANCH BLOCK): Status: ACTIVE | Noted: 2020-07-28

## 2020-08-11 PROBLEM — R53.82 CHRONIC FATIGUE AND MALAISE: Status: ACTIVE | Noted: 2017-10-03

## 2020-08-13 ENCOUNTER — TELEPHONE (OUTPATIENT)
Dept: CARDIOLOGY | Age: 85
End: 2020-08-13

## 2020-08-27 PROCEDURE — 88305 TISSUE EXAM BY PATHOLOGIST: CPT | Performed by: INTERNAL MEDICINE

## 2020-11-03 ENCOUNTER — OFFICE VISIT (OUTPATIENT)
Dept: CARDIOLOGY | Age: 85
End: 2020-11-03

## 2020-11-03 VITALS
WEIGHT: 134.1 LBS | DIASTOLIC BLOOD PRESSURE: 48 MMHG | SYSTOLIC BLOOD PRESSURE: 92 MMHG | HEART RATE: 82 BPM | HEIGHT: 61 IN | BODY MASS INDEX: 25.32 KG/M2

## 2020-11-03 DIAGNOSIS — I65.23 ASYMPTOMATIC CAROTID ARTERY STENOSIS, BILATERAL: Primary | ICD-10-CM

## 2020-11-03 DIAGNOSIS — R53.81 CHRONIC FATIGUE AND MALAISE: ICD-10-CM

## 2020-11-03 DIAGNOSIS — I44.7 LBBB (LEFT BUNDLE BRANCH BLOCK): ICD-10-CM

## 2020-11-03 DIAGNOSIS — E78.00 PURE HYPERCHOLESTEROLEMIA: ICD-10-CM

## 2020-11-03 DIAGNOSIS — R53.82 CHRONIC FATIGUE AND MALAISE: ICD-10-CM

## 2020-11-03 PROCEDURE — 99215 OFFICE O/P EST HI 40 MIN: CPT | Performed by: INTERNAL MEDICINE

## 2020-11-03 RX ORDER — RISPERIDONE 0.25 MG/1
0.12 TABLET ORAL DAILY
COMMUNITY
Start: 2020-10-05

## 2020-11-03 RX ORDER — SERTRALINE HYDROCHLORIDE 100 MG/1
100 TABLET, FILM COATED ORAL DAILY
COMMUNITY
Start: 2020-11-02

## 2020-11-03 ASSESSMENT — PATIENT HEALTH QUESTIONNAIRE - PHQ9
CLINICAL INTERPRETATION OF PHQ2 SCORE: NO FURTHER SCREENING NEEDED
1. LITTLE INTEREST OR PLEASURE IN DOING THINGS: NOT AT ALL
SUM OF ALL RESPONSES TO PHQ9 QUESTIONS 1 AND 2: 0
2. FEELING DOWN, DEPRESSED OR HOPELESS: NOT AT ALL
CLINICAL INTERPRETATION OF PHQ9 SCORE: NO FURTHER SCREENING NEEDED
SUM OF ALL RESPONSES TO PHQ9 QUESTIONS 1 AND 2: 0

## 2020-12-04 PROBLEM — F20.9 SCHIZOPHRENIA (HCC): Status: ACTIVE | Noted: 2020-12-04

## 2020-12-04 PROBLEM — F25.9 SCHIZOAFFECTIVE DISORDER (HCC): Status: ACTIVE | Noted: 2020-12-04

## 2020-12-04 PROBLEM — I49.3 PVC'S (PREMATURE VENTRICULAR CONTRACTIONS): Status: ACTIVE | Noted: 2020-12-04

## 2021-01-01 ENCOUNTER — EXTERNAL RECORD (OUTPATIENT)
Dept: OTHER | Age: 86
End: 2021-01-01

## 2021-05-03 ENCOUNTER — HOSPITAL ENCOUNTER (OUTPATIENT)
Dept: LAB | Facility: HOSPITAL | Age: 86
Discharge: HOME | End: 2021-05-03
Attending: INTERNAL MEDICINE
Payer: MEDICARE

## 2021-05-03 LAB
ALBUMIN SERPL-MCNC: 3.9 G/DL (ref 3.4–5)
ALBUMIN/GLOB SERPL: 1.2 {RATIO} (ref 1–2)
ALP SERPL-CCNC: 73 U/L (ref 55–142)
ALT SERPL-CCNC: 26 U/L (ref 13–56)
ANION GAP SERPL CALC-SCNC: 6 MMOL/L (ref 0–18)
AST SERPL-CCNC: 8 U/L (ref 15–37)
BILIRUB SERPL-MCNC: 0.4 MG/DL (ref 0.1–2)
BUN SERPL-MCNC: 21 MG/DL (ref 7–18)
BUN/CREAT SERPL: 25.9 (ref 10–20)
CALCIUM SERPL-MCNC: 8.8 MG/DL (ref 8.5–10.1)
CHLORIDE SERPL-SCNC: 107 MMOL/L (ref 98–112)
CHOLEST SERPL-MCNC: 189 MG/DL
CO2 SERPL-SCNC: 26 MMOL/L (ref 21–32)
CREAT SERPL-MCNC: 0.81 MG/DL (ref 0.55–1.02)
GLOBULIN SER-MCNC: 3.2 G/DL (ref 2.8–4.4)
GLUCOSE SERPL-MCNC: 95 MG/DL (ref 70–99)
HDLC SERPL-MCNC: 61 MG/DL (ref 40–59)
LDLC SERPL CALC-MCNC: 109 MG/DL
LENGTH OF FAST TIME PATIENT: YES H
LENGTH OF FAST TIME PATIENT: YES H
NONHDLC SERPL-MCNC: 128 MG/DL
POTASSIUM SERPL-SCNC: 4 MMOL/L (ref 3.5–5.1)
PROT SERPL-MCNC: 7.1 G/DL (ref 6.4–8.2)
SODIUM SERPL-SCNC: 139 MMOL/L (ref 136–145)
T4 FREE SERPL-MCNC: 1.1 NG/DL (ref 0.8–1.7)
TRIGL SERPL-MCNC: 94 MG/DL (ref 30–149)
TSH SERPL-ACNC: 2.8 MIU/ML (ref 0.36–3.74)
VLDLC SERPL CALC-MCNC: 19 MG/DL (ref 0–30)

## 2021-05-03 PROCEDURE — 80061 LIPID PANEL: CPT | Performed by: INTERNAL MEDICINE

## 2021-05-03 PROCEDURE — 36415 COLL VENOUS BLD VENIPUNCTURE: CPT | Performed by: INTERNAL MEDICINE

## 2021-05-03 PROCEDURE — 84443 ASSAY THYROID STIM HORMONE: CPT | Performed by: INTERNAL MEDICINE

## 2021-05-03 PROCEDURE — 84439 ASSAY OF FREE THYROXINE: CPT | Performed by: INTERNAL MEDICINE

## 2021-05-03 PROCEDURE — 80053 COMPREHEN METABOLIC PANEL: CPT | Performed by: INTERNAL MEDICINE

## 2021-06-07 ENCOUNTER — TELEPHONE (OUTPATIENT)
Dept: CARDIOLOGY | Age: 86
End: 2021-06-07

## 2021-08-05 ENCOUNTER — OFFICE VISIT (OUTPATIENT)
Dept: SURGERY | Facility: CLINIC | Age: 86
End: 2021-08-05
Payer: MEDICARE

## 2021-08-05 VITALS
HEART RATE: 75 BPM | BODY MASS INDEX: 24.35 KG/M2 | TEMPERATURE: 97 F | WEIGHT: 129 LBS | DIASTOLIC BLOOD PRESSURE: 65 MMHG | HEIGHT: 61 IN | SYSTOLIC BLOOD PRESSURE: 100 MMHG

## 2021-08-05 DIAGNOSIS — Z01.818 PRE-OP TESTING: ICD-10-CM

## 2021-08-05 DIAGNOSIS — I44.7 LBBB (LEFT BUNDLE BRANCH BLOCK): ICD-10-CM

## 2021-08-05 DIAGNOSIS — I65.23 ASYMPTOMATIC CAROTID ARTERY STENOSIS, BILATERAL: ICD-10-CM

## 2021-08-05 DIAGNOSIS — K62.0 ANAL POLYP: Primary | ICD-10-CM

## 2021-08-05 PROCEDURE — 46600 DIAGNOSTIC ANOSCOPY SPX: CPT | Performed by: COLON & RECTAL SURGERY

## 2021-08-05 PROCEDURE — 99204 OFFICE O/P NEW MOD 45 MIN: CPT | Performed by: COLON & RECTAL SURGERY

## 2021-08-05 NOTE — H&P
New Patient Visit Note       Active Problems      1. Anal polyp    2. LBBB (left bundle branch block)    3.  Asymptomatic carotid artery stenosis, bilateral        Chief Complaint   Patient presents with:  Anal Problem: NP rectal prolapse- PT states has a h hemorrhoids. The patient did subsequently undergo a barium enema which revealed diffuse diverticulosis. I acted as a scribe in this encounter.      The physician obtained a history, independently performed a physical exam, and developed an assessment an 7/30/2010   • S/P Colonoscopy & LGI (1/07) not to be rechecked as risk > benefit - ELMER Davis [2/17] 6/8/2016   • S/P Subtotal Thyroidectomy - Dr. Christina Mojica 7/30/2010   • S/P [7/15] cholecystectomy - EMILY Rouse* 5/29/2011   • Unspecified disorder of thyroid Oral Tab, TAKE 1 TABLET DAILY. , Disp: , Rfl:   •  aspirin 325 MG Oral Tab, 1/2  TABLET DAILY AS DIRECTED., Disp: , Rfl:   •  Calcium-Vitamin D (CALTRATE 600 PLUS-VIT D OR), Take  by mouth., Disp: , Rfl:   •  Cholecalciferol (VITAMIN D3) 1000 UNITS Oral Cap Rhythm: Normal rate and regular rhythm. Heart sounds: Normal heart sounds, S1 normal and S2 normal. No murmur heard. No S3 sounds. Pulmonary:      Effort: No tachypnea, accessory muscle usage or respiratory distress.       Breath sounds: No strido Right upper body: No supraclavicular adenopathy. Left upper body: No supraclavicular adenopathy. Skin:     General: Skin is warm and dry. Neurological:      Mental Status: She is alert and oriented to person, place, and time.    Psychiatric: pediatric scope through the sigmoid colon secondary to multiple diverticuli. A large hypertrophied anal papillae was noted at the time of the colonoscopy. There were also internal hemorrhoids.   The patient did subsequently undergo a barium enema which re Vitamin D deficiency   • Vertigo   • LBBB (left bundle branch block)     Plan:  1. No need for additional cardiac testing at present time. All cardiovascular testing was unremarkable and updated.   2. Continue current cardiovascular medications as scheduled

## 2021-08-05 NOTE — PATIENT INSTRUCTIONS
The patient presents today in consultation for tissue popping out of her anus. The patient states that about 1 year ago when she was pulling weeds she felt something prolapse out.   She states that it has continued to prolapse in and out and now it is ch around the anus on examination at today's visit coming from the polyp. I discussed with the patient that what she is feeling prolapsing is that this anal canal polyp. It does need to be removed.   I called up the patient's son on the phone and discussed

## 2021-08-06 ENCOUNTER — TELEPHONE (OUTPATIENT)
Dept: CARDIOLOGY | Age: 86
End: 2021-08-06

## 2021-08-06 DIAGNOSIS — Z01.818 PRE-OP TESTING: Primary | ICD-10-CM

## 2021-08-06 NOTE — PROCEDURES
Procedure:  Anoscopy    Surgeon: Dudley Gonzalez    Anesthesia: None    Findings: See the progress note attached for all findings    Operative Summary: The patient was placed in a prone position on the proctoscopy table, the hips were flexed in the jackknife p

## 2021-08-07 ENCOUNTER — LAB ENCOUNTER (OUTPATIENT)
Dept: LAB | Facility: HOSPITAL | Age: 86
End: 2021-08-07
Attending: COLON & RECTAL SURGERY
Payer: MEDICARE

## 2021-08-07 DIAGNOSIS — Z01.818 PRE-OP TESTING: ICD-10-CM

## 2021-08-07 LAB
ATRIAL RATE: 84 BPM
P AXIS: -4 DEGREES
P-R INTERVAL: 142 MS
Q-T INTERVAL: 404 MS
QRS DURATION: 126 MS
QTC CALCULATION (BEZET): 477 MS
R AXIS: -65 DEGREES
T AXIS: 85 DEGREES
VENTRICULAR RATE: 84 BPM

## 2021-08-07 PROCEDURE — 93010 ELECTROCARDIOGRAM REPORT: CPT | Performed by: INTERNAL MEDICINE

## 2021-08-07 PROCEDURE — 93005 ELECTROCARDIOGRAM TRACING: CPT

## 2021-08-08 LAB — SARS-COV-2 RNA RESP QL NAA+PROBE: NOT DETECTED

## 2021-08-10 ENCOUNTER — LAB REQUISITION (OUTPATIENT)
Dept: LAB | Facility: HOSPITAL | Age: 86
End: 2021-08-10
Payer: MEDICARE

## 2021-08-10 DIAGNOSIS — K62.0 ANAL POLYP: ICD-10-CM

## 2021-08-10 PROCEDURE — 88305 TISSUE EXAM BY PATHOLOGIST: CPT | Performed by: COLON & RECTAL SURGERY

## 2021-08-19 ENCOUNTER — OFFICE VISIT (OUTPATIENT)
Dept: SURGERY | Facility: CLINIC | Age: 86
End: 2021-08-19

## 2021-08-19 VITALS
DIASTOLIC BLOOD PRESSURE: 81 MMHG | TEMPERATURE: 98 F | HEART RATE: 92 BPM | BODY MASS INDEX: 24.17 KG/M2 | WEIGHT: 128 LBS | SYSTOLIC BLOOD PRESSURE: 130 MMHG | HEIGHT: 61 IN

## 2021-08-19 DIAGNOSIS — K62.0 ANAL POLYP: Primary | ICD-10-CM

## 2021-08-19 PROCEDURE — 99024 POSTOP FOLLOW-UP VISIT: CPT | Performed by: COLON & RECTAL SURGERY

## 2021-08-19 NOTE — PATIENT INSTRUCTIONS
This patient underwent transanal excision of an anal canal polyp on August 10, 2021. She has had some slight bleeding and purulent drainage. She does feel a small lump at the operative site. She has no fever, chills, constipation, or diarrhea.

## 2021-08-19 NOTE — PROGRESS NOTES
Post Operative Visit Note       Active Problems  1. Anal polyp         Chief Complaint   Patient presents with:  Post-Op: PO - EXCISION OF ANAL CANAL POLYP 8/10 - PT C/O SLIGHT BLEEDING AND PUSS AFTER WIPING ONCE, BUT NEVER HAPPENED AGAIN.  PT OVERALL FEELS been reviewed by me today. Past Medical History:   Diagnosis Date   • Calculus of kidney    • Calculus of right kidney ~ 2mm Dx CT Scan [5/11] - KATHERINE Sandhu MD 6/2/2011   • Diverticulosis of colon (without mention of hemorrhage)    • JOSÉ MIGUEL (generalized anxiety tobacco: Never Used    Vaping Use      Vaping Use: Never used    Substance and Sexual Activity      Alcohol use: No      Drug use: No    Other Topics      Concerns:       Current Outpatient Medications:   •  LEVOTHYROXINE SODIUM 50 MCG Oral Tab, TAKE 1 TAB Negative for tremors, syncope and weakness. Hematological: Negative for adenopathy. Does not bruise/bleed easily. Psychiatric/Behavioral: Negative for behavioral problems and sleep disturbance. Physical Findings   /81   Pulse 92   Temp 98. 1 1, 2021, she should return back to my attention. No orders of the defined types were placed in this encounter. Imaging & Referrals   None    Follow Up  Return if symptoms worsen or fail to improve.     Nic Saravia MD

## 2022-04-04 ENCOUNTER — HOSPITAL ENCOUNTER (OUTPATIENT)
Dept: LAB | Facility: HOSPITAL | Age: 87
Discharge: HOME | End: 2022-04-04
Attending: INTERNAL MEDICINE
Payer: MEDICARE

## 2022-04-04 LAB
ALBUMIN SERPL-MCNC: 3.8 G/DL (ref 3.4–5)
ALBUMIN/GLOB SERPL: 1.1 {RATIO} (ref 1–2)
ALP LIVER SERPL-CCNC: 69 U/L
ALT SERPL-CCNC: 22 U/L
ANION GAP SERPL CALC-SCNC: 5 MMOL/L (ref 0–18)
AST SERPL-CCNC: 14 U/L (ref 15–37)
BILIRUB SERPL-MCNC: 0.4 MG/DL (ref 0.1–2)
BUN BLD-MCNC: 14 MG/DL (ref 7–18)
CALCIUM BLD-MCNC: 9.2 MG/DL (ref 8.5–10.1)
CHLORIDE SERPL-SCNC: 109 MMOL/L (ref 98–112)
CHOLEST SERPL-MCNC: 189 MG/DL (ref ?–200)
CO2 SERPL-SCNC: 28 MMOL/L (ref 21–32)
CREAT BLD-MCNC: 0.87 MG/DL
GLOBULIN PLAS-MCNC: 3.4 G/DL (ref 2.8–4.4)
GLUCOSE BLD-MCNC: 100 MG/DL (ref 70–99)
HDLC SERPL-MCNC: 58 MG/DL (ref 40–59)
LDLC SERPL CALC-MCNC: 109 MG/DL (ref ?–100)
NONHDLC SERPL-MCNC: 131 MG/DL (ref ?–130)
OSMOLALITY SERPL CALC.SUM OF ELEC: 295 MOSM/KG (ref 275–295)
POTASSIUM SERPL-SCNC: 4.2 MMOL/L (ref 3.5–5.1)
PROT SERPL-MCNC: 7.2 G/DL (ref 6.4–8.2)
SODIUM SERPL-SCNC: 142 MMOL/L (ref 136–145)
TRIGL SERPL-MCNC: 127 MG/DL (ref 30–149)
TSI SER-ACNC: 2.22 MIU/ML (ref 0.36–3.74)
VLDLC SERPL CALC-MCNC: 22 MG/DL (ref 0–30)

## 2022-04-04 PROCEDURE — 80053 COMPREHEN METABOLIC PANEL: CPT | Performed by: INTERNAL MEDICINE

## 2022-04-04 PROCEDURE — 36415 COLL VENOUS BLD VENIPUNCTURE: CPT | Performed by: INTERNAL MEDICINE

## 2022-04-04 PROCEDURE — 84443 ASSAY THYROID STIM HORMONE: CPT | Performed by: INTERNAL MEDICINE

## 2022-04-04 PROCEDURE — 80061 LIPID PANEL: CPT | Performed by: INTERNAL MEDICINE

## 2022-10-09 ENCOUNTER — APPOINTMENT (OUTPATIENT)
Dept: GENERAL RADIOLOGY | Facility: HOSPITAL | Age: 87
End: 2022-10-09
Attending: HOSPITALIST
Payer: MEDICARE

## 2022-10-09 ENCOUNTER — HOSPITAL ENCOUNTER (INPATIENT)
Facility: HOSPITAL | Age: 87
LOS: 8 days | Discharge: NURSING, REHAB OR LONG-TERM CARE | End: 2022-10-17
Attending: EMERGENCY MEDICINE | Admitting: INTERNAL MEDICINE
Payer: MEDICARE

## 2022-10-09 ENCOUNTER — APPOINTMENT (OUTPATIENT)
Dept: GENERAL RADIOLOGY | Facility: HOSPITAL | Age: 87
End: 2022-10-09
Payer: MEDICARE

## 2022-10-09 ENCOUNTER — APPOINTMENT (OUTPATIENT)
Dept: CT IMAGING | Facility: HOSPITAL | Age: 87
End: 2022-10-09
Attending: EMERGENCY MEDICINE
Payer: MEDICARE

## 2022-10-09 DIAGNOSIS — R07.9 CHEST PAIN OF UNCERTAIN ETIOLOGY: ICD-10-CM

## 2022-10-09 DIAGNOSIS — K56.609 SBO (SMALL BOWEL OBSTRUCTION) (HCC): Primary | ICD-10-CM

## 2022-10-09 DIAGNOSIS — E87.1 HYPONATREMIA: ICD-10-CM

## 2022-10-09 PROBLEM — R73.9 HYPERGLYCEMIA: Status: ACTIVE | Noted: 2022-10-09

## 2022-10-09 PROBLEM — N17.9 ACUTE KIDNEY INJURY (HCC): Status: ACTIVE | Noted: 2022-10-09

## 2022-10-09 PROBLEM — R79.89 AZOTEMIA: Status: ACTIVE | Noted: 2022-10-09

## 2022-10-09 PROBLEM — N17.9 ACUTE KIDNEY INJURY: Status: ACTIVE | Noted: 2022-10-09

## 2022-10-09 LAB
ALBUMIN SERPL-MCNC: 4.1 G/DL (ref 3.4–5)
ALBUMIN/GLOB SERPL: 1.2 {RATIO} (ref 1–2)
ALP LIVER SERPL-CCNC: 70 U/L
ALT SERPL-CCNC: 21 U/L
ANION GAP SERPL CALC-SCNC: 11 MMOL/L (ref 0–18)
APTT PPP: 21.7 SECONDS (ref 23.3–35.6)
AST SERPL-CCNC: 10 U/L (ref 15–37)
ATRIAL RATE: 97 BPM
BASOPHILS # BLD AUTO: 0.08 X10(3) UL (ref 0–0.2)
BASOPHILS NFR BLD AUTO: 0.6 %
BILIRUB SERPL-MCNC: 1.3 MG/DL (ref 0.1–2)
BILIRUB UR QL STRIP.AUTO: NEGATIVE
BUN BLD-MCNC: 58 MG/DL (ref 7–18)
CALCIUM BLD-MCNC: 9.8 MG/DL (ref 8.5–10.1)
CHLORIDE SERPL-SCNC: 90 MMOL/L (ref 98–112)
CLARITY UR REFRACT.AUTO: CLEAR
CO2 SERPL-SCNC: 27 MMOL/L (ref 21–32)
COLOR UR AUTO: YELLOW
CREAT BLD-MCNC: 1.65 MG/DL
CREAT UR-SCNC: 36.9 MG/DL
EOSINOPHIL # BLD AUTO: 0.05 X10(3) UL (ref 0–0.7)
EOSINOPHIL NFR BLD AUTO: 0.4 %
ERYTHROCYTE [DISTWIDTH] IN BLOOD BY AUTOMATED COUNT: 14.7 %
GFR SERPLBLD BASED ON 1.73 SQ M-ARVRAT: 30 ML/MIN/1.73M2 (ref 60–?)
GLOBULIN PLAS-MCNC: 3.3 G/DL (ref 2.8–4.4)
GLUCOSE BLD-MCNC: 144 MG/DL (ref 70–99)
GLUCOSE UR STRIP.AUTO-MCNC: NEGATIVE MG/DL
HCT VFR BLD AUTO: 50.9 %
HGB BLD-MCNC: 17.5 G/DL
IMM GRANULOCYTES # BLD AUTO: 0.07 X10(3) UL (ref 0–1)
IMM GRANULOCYTES NFR BLD: 0.5 %
INR BLD: 1.09 (ref 0.85–1.16)
KETONES UR STRIP.AUTO-MCNC: NEGATIVE MG/DL
LACTATE SERPL-SCNC: 1.4 MMOL/L (ref 0.4–2)
LEUKOCYTE ESTERASE UR QL STRIP.AUTO: NEGATIVE
LIPASE SERPL-CCNC: 145 U/L (ref 73–393)
LYMPHOCYTES # BLD AUTO: 1.05 X10(3) UL (ref 1–4)
LYMPHOCYTES NFR BLD AUTO: 7.8 %
MCH RBC QN AUTO: 28.9 PG (ref 26–34)
MCHC RBC AUTO-ENTMCNC: 34.4 G/DL (ref 31–37)
MCV RBC AUTO: 84.1 FL
MONOCYTES # BLD AUTO: 1.48 X10(3) UL (ref 0.1–1)
MONOCYTES NFR BLD AUTO: 11 %
NEUTROPHILS # BLD AUTO: 10.78 X10 (3) UL (ref 1.5–7.7)
NEUTROPHILS # BLD AUTO: 10.78 X10(3) UL (ref 1.5–7.7)
NEUTROPHILS NFR BLD AUTO: 79.7 %
NITRITE UR QL STRIP.AUTO: NEGATIVE
OSMOLALITY SERPL CALC.SUM OF ELEC: 285 MOSM/KG (ref 275–295)
P AXIS: 69 DEGREES
P-R INTERVAL: 150 MS
PH UR STRIP.AUTO: 6 [PH] (ref 5–8)
PLATELET # BLD AUTO: 325 10(3)UL (ref 150–450)
POTASSIUM SERPL-SCNC: 4 MMOL/L (ref 3.5–5.1)
PROT SERPL-MCNC: 7.4 G/DL (ref 6.4–8.2)
PROT UR STRIP.AUTO-MCNC: NEGATIVE MG/DL
PROTHROMBIN TIME: 14.1 SECONDS (ref 11.6–14.8)
Q-T INTERVAL: 374 MS
QRS DURATION: 122 MS
QTC CALCULATION (BEZET): 474 MS
R AXIS: -59 DEGREES
RBC # BLD AUTO: 6.05 X10(6)UL
RBC UR QL AUTO: NEGATIVE
SARS-COV-2 RNA RESP QL NAA+PROBE: NOT DETECTED
SODIUM SERPL-SCNC: 128 MMOL/L (ref 136–145)
SODIUM SERPL-SCNC: 13 MMOL/L
SP GR UR STRIP.AUTO: 1.01 (ref 1–1.03)
T AXIS: 71 DEGREES
TROPONIN I HIGH SENSITIVITY: 19 NG/L
TROPONIN I HIGH SENSITIVITY: 23 NG/L
UROBILINOGEN UR STRIP.AUTO-MCNC: <2 MG/DL
VENTRICULAR RATE: 97 BPM
WBC # BLD AUTO: 13.5 X10(3) UL (ref 4–11)

## 2022-10-09 PROCEDURE — 82570 ASSAY OF URINE CREATININE: CPT | Performed by: HOSPITALIST

## 2022-10-09 PROCEDURE — 71045 X-RAY EXAM CHEST 1 VIEW: CPT | Performed by: HOSPITALIST

## 2022-10-09 PROCEDURE — 99285 EMERGENCY DEPT VISIT HI MDM: CPT

## 2022-10-09 PROCEDURE — 71045 X-RAY EXAM CHEST 1 VIEW: CPT | Performed by: EMERGENCY MEDICINE

## 2022-10-09 PROCEDURE — 85730 THROMBOPLASTIN TIME PARTIAL: CPT | Performed by: EMERGENCY MEDICINE

## 2022-10-09 PROCEDURE — 74177 CT ABD & PELVIS W/CONTRAST: CPT | Performed by: EMERGENCY MEDICINE

## 2022-10-09 PROCEDURE — 96374 THER/PROPH/DIAG INJ IV PUSH: CPT

## 2022-10-09 PROCEDURE — 93005 ELECTROCARDIOGRAM TRACING: CPT

## 2022-10-09 PROCEDURE — 85610 PROTHROMBIN TIME: CPT | Performed by: EMERGENCY MEDICINE

## 2022-10-09 PROCEDURE — 80053 COMPREHEN METABOLIC PANEL: CPT

## 2022-10-09 PROCEDURE — 84484 ASSAY OF TROPONIN QUANT: CPT | Performed by: HOSPITALIST

## 2022-10-09 PROCEDURE — 80053 COMPREHEN METABOLIC PANEL: CPT | Performed by: EMERGENCY MEDICINE

## 2022-10-09 PROCEDURE — 93010 ELECTROCARDIOGRAM REPORT: CPT

## 2022-10-09 PROCEDURE — 0D9670Z DRAINAGE OF STOMACH WITH DRAINAGE DEVICE, VIA NATURAL OR ARTIFICIAL OPENING: ICD-10-PCS | Performed by: SURGERY

## 2022-10-09 PROCEDURE — 85025 COMPLETE CBC W/AUTO DIFF WBC: CPT

## 2022-10-09 PROCEDURE — 84484 ASSAY OF TROPONIN QUANT: CPT

## 2022-10-09 PROCEDURE — 83605 ASSAY OF LACTIC ACID: CPT | Performed by: EMERGENCY MEDICINE

## 2022-10-09 PROCEDURE — 85025 COMPLETE CBC W/AUTO DIFF WBC: CPT | Performed by: EMERGENCY MEDICINE

## 2022-10-09 PROCEDURE — 84484 ASSAY OF TROPONIN QUANT: CPT | Performed by: EMERGENCY MEDICINE

## 2022-10-09 PROCEDURE — 81003 URINALYSIS AUTO W/O SCOPE: CPT | Performed by: EMERGENCY MEDICINE

## 2022-10-09 PROCEDURE — 96361 HYDRATE IV INFUSION ADD-ON: CPT

## 2022-10-09 PROCEDURE — 84300 ASSAY OF URINE SODIUM: CPT | Performed by: HOSPITALIST

## 2022-10-09 PROCEDURE — 83690 ASSAY OF LIPASE: CPT | Performed by: EMERGENCY MEDICINE

## 2022-10-09 RX ORDER — IOHEXOL 350 MG/ML
80 INJECTION, SOLUTION INTRAVENOUS
Status: COMPLETED | OUTPATIENT
Start: 2022-10-09 | End: 2022-10-09

## 2022-10-09 RX ORDER — DEXTROSE, SODIUM CHLORIDE, SODIUM LACTATE, POTASSIUM CHLORIDE, AND CALCIUM CHLORIDE 5; .6; .31; .03; .02 G/100ML; G/100ML; G/100ML; G/100ML; G/100ML
INJECTION, SOLUTION INTRAVENOUS CONTINUOUS
Status: DISCONTINUED | OUTPATIENT
Start: 2022-10-09 | End: 2022-10-17

## 2022-10-09 RX ORDER — MORPHINE SULFATE 2 MG/ML
1 INJECTION, SOLUTION INTRAMUSCULAR; INTRAVENOUS EVERY 2 HOUR PRN
Status: DISCONTINUED | OUTPATIENT
Start: 2022-10-09 | End: 2022-10-17

## 2022-10-09 RX ORDER — HYDROCODONE BITARTRATE AND ACETAMINOPHEN 5; 325 MG/1; MG/1
2 TABLET ORAL EVERY 4 HOURS PRN
Status: DISCONTINUED | OUTPATIENT
Start: 2022-10-09 | End: 2022-10-17

## 2022-10-09 RX ORDER — HALOPERIDOL 5 MG/ML
1 INJECTION INTRAMUSCULAR ONCE
Status: COMPLETED | OUTPATIENT
Start: 2022-10-09 | End: 2022-10-09

## 2022-10-09 RX ORDER — ENOXAPARIN SODIUM 100 MG/ML
40 INJECTION SUBCUTANEOUS DAILY
Status: DISCONTINUED | OUTPATIENT
Start: 2022-10-09 | End: 2022-10-09

## 2022-10-09 RX ORDER — CIPROFLOXACIN 250 MG/1
250 TABLET, FILM COATED ORAL 2 TIMES DAILY
COMMUNITY
Start: 2022-10-08 | End: 2022-10-17

## 2022-10-09 RX ORDER — ASPIRIN 81 MG/1
324 TABLET, CHEWABLE ORAL ONCE
Status: COMPLETED | OUTPATIENT
Start: 2022-10-09 | End: 2022-10-09

## 2022-10-09 RX ORDER — SODIUM CHLORIDE 9 MG/ML
125 INJECTION, SOLUTION INTRAVENOUS CONTINUOUS
Status: DISCONTINUED | OUTPATIENT
Start: 2022-10-09 | End: 2022-10-09

## 2022-10-09 RX ORDER — ACETAMINOPHEN 325 MG/1
650 TABLET ORAL EVERY 4 HOURS PRN
Status: DISCONTINUED | OUTPATIENT
Start: 2022-10-09 | End: 2022-10-17

## 2022-10-09 RX ORDER — HYDROCODONE BITARTRATE AND ACETAMINOPHEN 5; 325 MG/1; MG/1
1 TABLET ORAL EVERY 4 HOURS PRN
Status: DISCONTINUED | OUTPATIENT
Start: 2022-10-09 | End: 2022-10-17

## 2022-10-09 RX ORDER — SODIUM CHLORIDE 9 MG/ML
INJECTION, SOLUTION INTRAVENOUS CONTINUOUS
Status: ACTIVE | OUTPATIENT
Start: 2022-10-09 | End: 2022-10-09

## 2022-10-09 RX ORDER — ONDANSETRON 2 MG/ML
4 INJECTION INTRAMUSCULAR; INTRAVENOUS ONCE
Status: COMPLETED | OUTPATIENT
Start: 2022-10-09 | End: 2022-10-09

## 2022-10-09 RX ORDER — HEPARIN SODIUM 5000 [USP'U]/ML
5000 INJECTION, SOLUTION INTRAVENOUS; SUBCUTANEOUS EVERY 8 HOURS SCHEDULED
Status: DISCONTINUED | OUTPATIENT
Start: 2022-10-09 | End: 2022-10-17

## 2022-10-09 RX ORDER — MORPHINE SULFATE 2 MG/ML
2 INJECTION, SOLUTION INTRAMUSCULAR; INTRAVENOUS EVERY 2 HOUR PRN
Status: DISCONTINUED | OUTPATIENT
Start: 2022-10-09 | End: 2022-10-17

## 2022-10-09 RX ORDER — SODIUM CHLORIDE 9 MG/ML
INJECTION, SOLUTION INTRAVENOUS CONTINUOUS
Status: DISCONTINUED | OUTPATIENT
Start: 2022-10-09 | End: 2022-10-09

## 2022-10-09 RX ORDER — MORPHINE SULFATE 4 MG/ML
4 INJECTION, SOLUTION INTRAMUSCULAR; INTRAVENOUS EVERY 2 HOUR PRN
Status: DISCONTINUED | OUTPATIENT
Start: 2022-10-09 | End: 2022-10-17

## 2022-10-09 RX ORDER — METOCLOPRAMIDE HYDROCHLORIDE 5 MG/ML
5 INJECTION INTRAMUSCULAR; INTRAVENOUS EVERY 8 HOURS PRN
Status: DISCONTINUED | OUTPATIENT
Start: 2022-10-09 | End: 2022-10-09

## 2022-10-09 RX ORDER — ONDANSETRON 2 MG/ML
4 INJECTION INTRAMUSCULAR; INTRAVENOUS EVERY 4 HOURS PRN
Status: ACTIVE | OUTPATIENT
Start: 2022-10-09 | End: 2022-10-09

## 2022-10-09 RX ORDER — HYDROMORPHONE HYDROCHLORIDE 1 MG/ML
0.5 INJECTION, SOLUTION INTRAMUSCULAR; INTRAVENOUS; SUBCUTANEOUS EVERY 30 MIN PRN
Status: ACTIVE | OUTPATIENT
Start: 2022-10-09 | End: 2022-10-09

## 2022-10-09 RX ORDER — ONDANSETRON 2 MG/ML
4 INJECTION INTRAMUSCULAR; INTRAVENOUS EVERY 6 HOURS PRN
Status: DISCONTINUED | OUTPATIENT
Start: 2022-10-09 | End: 2022-10-17

## 2022-10-09 NOTE — PLAN OF CARE
ER admit w/ SBO and UTI+, Pt is AAOX3, forgetful at times, VSS, room air, IVF, up SBA, voiding freely to restroom, had and episode of brown emesis, NG tube placed, brown seedy drainage collected via gravity, bedside x-ray done to verify placement, waiting on results, Pt doing well, all needs met, all safety measures in place, call light within reach, will CTM.     X-ray: Nasogastric tube is appropriately positioned in region of gastric body    Will place NG to LIS

## 2022-10-09 NOTE — PROGRESS NOTES
Health system Pharmacy Note:  Renal Dose Adjustment for enoxaparin (LOVENOX)    Mayela Atkinson has been prescribed enoxaparin 40 mg subcutaneously every 24 hours. Estimated Creatinine Clearance: 18.1 mL/min (A) (based on SCr of 1.65 mg/dL (H)). Calculated CrCl less than 20 mL/min so the dose of Enoxaparin (LOVENOX) has been changed to heparin 5000 units every 8 hours per P&T approved protocol. Pharmacy will continue to follow, and make additional adjustments if needed.       Thank you,  Nohemi Willard, [facility]  10/9/2022 12:52 PM

## 2022-10-09 NOTE — ED QUICK NOTES
Report received from [facility]. Pt awake and alert, skin w/d,resps appear unlabored. Pt c/o some discomfort to sternal area. Pt repositioned on cart more upright and pt reports improvement in discomfort. Son at bedside. NS infusing via pump at 125ml/hr. Pt and son aware awaiting bed assignment. Pain medication offered but pt refuses at this time.

## 2022-10-09 NOTE — ED INITIAL ASSESSMENT (HPI)
Patient here with c/o midsternal chest pain x 1 week but worse tonight. Per son, patient saw her PCP yesterday and was found to have UTI. Patient started Cipro and tonight was unable to sleep due to the chest pain. Patient has had 2 episodes of vomiting since Friday. Denies fever or diarrhea.

## 2022-10-10 ENCOUNTER — APPOINTMENT (OUTPATIENT)
Dept: GENERAL RADIOLOGY | Facility: HOSPITAL | Age: 87
End: 2022-10-10
Attending: STUDENT IN AN ORGANIZED HEALTH CARE EDUCATION/TRAINING PROGRAM
Payer: MEDICARE

## 2022-10-10 LAB
ALBUMIN SERPL-MCNC: 3.5 G/DL (ref 3.4–5)
ALBUMIN/GLOB SERPL: 1.3 {RATIO} (ref 1–2)
ALP LIVER SERPL-CCNC: 60 U/L
ALT SERPL-CCNC: 16 U/L
ANION GAP SERPL CALC-SCNC: 8 MMOL/L (ref 0–18)
AST SERPL-CCNC: 9 U/L (ref 15–37)
BASOPHILS # BLD AUTO: 0.05 X10(3) UL (ref 0–0.2)
BASOPHILS NFR BLD AUTO: 0.5 %
BILIRUB SERPL-MCNC: 0.8 MG/DL (ref 0.1–2)
BUN BLD-MCNC: 57 MG/DL (ref 7–18)
CALCIUM BLD-MCNC: 9.1 MG/DL (ref 8.5–10.1)
CHLORIDE SERPL-SCNC: 94 MMOL/L (ref 98–112)
CO2 SERPL-SCNC: 30 MMOL/L (ref 21–32)
CREAT BLD-MCNC: 1.22 MG/DL
EOSINOPHIL # BLD AUTO: 0.02 X10(3) UL (ref 0–0.7)
EOSINOPHIL NFR BLD AUTO: 0.2 %
ERYTHROCYTE [DISTWIDTH] IN BLOOD BY AUTOMATED COUNT: 14.8 %
GFR SERPLBLD BASED ON 1.73 SQ M-ARVRAT: 43 ML/MIN/1.73M2 (ref 60–?)
GLOBULIN PLAS-MCNC: 2.7 G/DL (ref 2.8–4.4)
GLUCOSE BLD-MCNC: 129 MG/DL (ref 70–99)
HCT VFR BLD AUTO: 49.7 %
HGB BLD-MCNC: 16.9 G/DL
IMM GRANULOCYTES # BLD AUTO: 0.04 X10(3) UL (ref 0–1)
IMM GRANULOCYTES NFR BLD: 0.4 %
LYMPHOCYTES # BLD AUTO: 0.47 X10(3) UL (ref 1–4)
LYMPHOCYTES NFR BLD AUTO: 4.3 %
MCH RBC QN AUTO: 28.5 PG (ref 26–34)
MCHC RBC AUTO-ENTMCNC: 34 G/DL (ref 31–37)
MCV RBC AUTO: 84 FL
MONOCYTES # BLD AUTO: 0.96 X10(3) UL (ref 0.1–1)
MONOCYTES NFR BLD AUTO: 8.7 %
NEUTROPHILS # BLD AUTO: 9.5 X10 (3) UL (ref 1.5–7.7)
NEUTROPHILS # BLD AUTO: 9.5 X10(3) UL (ref 1.5–7.7)
NEUTROPHILS NFR BLD AUTO: 85.9 %
OSMOLALITY SERPL CALC.SUM OF ELEC: 292 MOSM/KG (ref 275–295)
PLATELET # BLD AUTO: 284 10(3)UL (ref 150–450)
POTASSIUM SERPL-SCNC: 3.9 MMOL/L (ref 3.5–5.1)
PROT SERPL-MCNC: 6.2 G/DL (ref 6.4–8.2)
RBC # BLD AUTO: 5.92 X10(6)UL
SODIUM SERPL-SCNC: 132 MMOL/L (ref 136–145)
WBC # BLD AUTO: 11 X10(3) UL (ref 4–11)

## 2022-10-10 PROCEDURE — 80053 COMPREHEN METABOLIC PANEL: CPT | Performed by: INTERNAL MEDICINE

## 2022-10-10 PROCEDURE — 85025 COMPLETE CBC W/AUTO DIFF WBC: CPT | Performed by: INTERNAL MEDICINE

## 2022-10-10 PROCEDURE — 74019 RADEX ABDOMEN 2 VIEWS: CPT | Performed by: STUDENT IN AN ORGANIZED HEALTH CARE EDUCATION/TRAINING PROGRAM

## 2022-10-10 NOTE — PLAN OF CARE
Patient is Alert and oriented x3 forgetful at times. Vital signs are stable on room air resting in bed. IVF running tolerating well. Voiding freely to the restroom. NG tube in placed connected to gravity. Plan discussed with patient, bed at lowest position. Patient will be monitored over night.

## 2022-10-10 NOTE — PLAN OF CARE
Patient is AOX3, forgetful at times. VSS on room air. Continuous IV fluids infusing patient tolerating well. Patient also voiding to the restroom. NG tube in place connected to low intermittent suction. Plan discussed with patient and family updated over the phone. Bed at lowest position. Patient will be monitored throughout the shift.

## 2022-10-10 NOTE — CM/SW NOTE
10/10/22 1400   CM/SW Referral Data   Referral Source Social Work (self-referral)   Reason for Referral Discharge planning   Informant Patient;Daughter;EMR;Clinical Staff Member   Patient Info   Patient's Current Mental Status at Time of Assessment Alert;Oriented   Patient's [address]   Number of Levels in Home 1   Patient lives with Son   Patient Status Prior to Admission   Independent with ADLs and Mobility Yes   Discharge Needs   Anticipated D/C needs No anticipated discharge needs; To be determined       Patient is an 81 y/o woman admitted with SBO and UTI. Informed during daily rounds that pt lives with her son who is disabled. Met with pt who stated that she lives with her son, Mari Pal in a one level ranch home. She is normally independent with ADLs and does not use assistive devices/DME. She stated her son has a \"mental disability\" and expressed concern that he is taking his medication at home. He does not require any physical assistance with ADLS. She stated her son, Vidal Kee and dtr, Noe Green live locally and should be checking in on him. Spoke with pt's dtr, Noe Green who confirmed she and her brother, Vidal Kee are visiting with Mari Pal and helping to organize his medications. Updated patient. / to remain available for support and/or discharge planning.       [facility]  Discharge Planner  [phone]

## 2022-10-11 ENCOUNTER — APPOINTMENT (OUTPATIENT)
Dept: GENERAL RADIOLOGY | Facility: HOSPITAL | Age: 87
End: 2022-10-11
Attending: PHYSICIAN ASSISTANT
Payer: MEDICARE

## 2022-10-11 LAB
ANION GAP SERPL CALC-SCNC: 5 MMOL/L (ref 0–18)
BUN BLD-MCNC: 47 MG/DL (ref 7–18)
CALCIUM BLD-MCNC: 9.1 MG/DL (ref 8.5–10.1)
CHLORIDE SERPL-SCNC: 96 MMOL/L (ref 98–112)
CO2 SERPL-SCNC: 37 MMOL/L (ref 21–32)
CREAT BLD-MCNC: 1.16 MG/DL
ERYTHROCYTE [DISTWIDTH] IN BLOOD BY AUTOMATED COUNT: 14.4 %
GFR SERPLBLD BASED ON 1.73 SQ M-ARVRAT: 46 ML/MIN/1.73M2 (ref 60–?)
GLUCOSE BLD-MCNC: 109 MG/DL (ref 70–99)
HCT VFR BLD AUTO: 46.5 %
HGB BLD-MCNC: 15.4 G/DL
MAGNESIUM SERPL-MCNC: 2.7 MG/DL (ref 1.6–2.6)
MCH RBC QN AUTO: 28.5 PG (ref 26–34)
MCHC RBC AUTO-ENTMCNC: 33.1 G/DL (ref 31–37)
MCV RBC AUTO: 86.1 FL
OSMOLALITY SERPL CALC.SUM OF ELEC: 299 MOSM/KG (ref 275–295)
PLATELET # BLD AUTO: 241 10(3)UL (ref 150–450)
POTASSIUM SERPL-SCNC: 4.3 MMOL/L (ref 3.5–5.1)
RBC # BLD AUTO: 5.4 X10(6)UL
SODIUM SERPL-SCNC: 138 MMOL/L (ref 136–145)
WBC # BLD AUTO: 6 X10(3) UL (ref 4–11)

## 2022-10-11 PROCEDURE — 74250 X-RAY XM SM INT 1CNTRST STD: CPT | Performed by: PHYSICIAN ASSISTANT

## 2022-10-11 PROCEDURE — 83735 ASSAY OF MAGNESIUM: CPT | Performed by: INTERNAL MEDICINE

## 2022-10-11 PROCEDURE — 80048 BASIC METABOLIC PNL TOTAL CA: CPT | Performed by: INTERNAL MEDICINE

## 2022-10-11 PROCEDURE — 85027 COMPLETE CBC AUTOMATED: CPT | Performed by: INTERNAL MEDICINE

## 2022-10-11 PROCEDURE — C9113 INJ PANTOPRAZOLE SODIUM, VIA: HCPCS | Performed by: PHYSICIAN ASSISTANT

## 2022-10-11 NOTE — PLAN OF CARE
Aox4, NGT to left nare intact draining out green output, patient reporting bloating that is feeling better with LIS, on room air , scds in place, on Heparin subcutaneous, denying nausea, IVF as ordered, up standby, plan to complete obsctructed series, no further needs, will continue to monitor.

## 2022-10-11 NOTE — PLAN OF CARE
Alert and oriented x4. VSS. On RA. . Telemetry monitoring. SCDs on BLE. NPO. NGT to left nare intact draining out green output, set to low intermittent suction. Denies pain and nausea at this time. Up with standby assist. Voiding freely. Plan is to complete obstruction series. Call light within reach.

## 2022-10-11 NOTE — PROGRESS NOTES
Received call from x-ray stating they are trying to get a hold of Dr Mirian Sagastume. Perfect serve message sent to Dr Mirian Sagastume with radiologist number. Spoke with Dr Mirian Sagastume, she is aware.

## 2022-10-12 ENCOUNTER — ANESTHESIA (OUTPATIENT)
Dept: SURGERY | Facility: HOSPITAL | Age: 87
End: 2022-10-12
Payer: MEDICARE

## 2022-10-12 ENCOUNTER — ANESTHESIA EVENT (OUTPATIENT)
Dept: SURGERY | Facility: HOSPITAL | Age: 87
End: 2022-10-12
Payer: MEDICARE

## 2022-10-12 PROCEDURE — 3E0M05Z INTRODUCTION OF ADHESION BARRIER INTO PERITONEAL CAVITY, OPEN APPROACH: ICD-10-PCS | Performed by: SURGERY

## 2022-10-12 PROCEDURE — C9113 INJ PANTOPRAZOLE SODIUM, VIA: HCPCS | Performed by: PHYSICIAN ASSISTANT

## 2022-10-12 PROCEDURE — 0DN80ZZ RELEASE SMALL INTESTINE, OPEN APPROACH: ICD-10-PCS | Performed by: SURGERY

## 2022-10-12 PROCEDURE — 3E1G38Z IRRIGATION OF UPPER GI USING IRRIGATING SUBSTANCE, PERCUTANEOUS APPROACH: ICD-10-PCS | Performed by: SURGERY

## 2022-10-12 PROCEDURE — 3E0T3BZ INTRODUCTION OF ANESTHETIC AGENT INTO PERIPHERAL NERVES AND PLEXI, PERCUTANEOUS APPROACH: ICD-10-PCS | Performed by: SURGERY

## 2022-10-12 DEVICE — SEPRAFILM ADHESION BARRIER (MEMBRANE) IS A STERILE, BIORESORBABLE, TRANSLUCENT ADHESION BARRIER COMPOSED OF TWO ANIONIC POLYSACCHARIDES, SODIUM HYALURONATE (HA) AND CARBOXYMETHYLCELLULOSE (CMC).
Type: IMPLANTABLE DEVICE | Site: ABDOMEN | Status: FUNCTIONAL
Brand: SEPRAFILM

## 2022-10-12 RX ORDER — HYDROMORPHONE HYDROCHLORIDE 1 MG/ML
0.4 INJECTION, SOLUTION INTRAMUSCULAR; INTRAVENOUS; SUBCUTANEOUS EVERY 2 HOUR PRN
Status: DISCONTINUED | OUTPATIENT
Start: 2022-10-12 | End: 2022-10-17

## 2022-10-12 RX ORDER — HYDROMORPHONE HYDROCHLORIDE 1 MG/ML
0.2 INJECTION, SOLUTION INTRAMUSCULAR; INTRAVENOUS; SUBCUTANEOUS EVERY 2 HOUR PRN
Status: DISCONTINUED | OUTPATIENT
Start: 2022-10-12 | End: 2022-10-17

## 2022-10-12 RX ORDER — ROCURONIUM BROMIDE 10 MG/ML
INJECTION, SOLUTION INTRAVENOUS AS NEEDED
Status: DISCONTINUED | OUTPATIENT
Start: 2022-10-12 | End: 2022-10-12 | Stop reason: SURG

## 2022-10-12 RX ORDER — HYDROMORPHONE HYDROCHLORIDE 1 MG/ML
0.8 INJECTION, SOLUTION INTRAMUSCULAR; INTRAVENOUS; SUBCUTANEOUS EVERY 2 HOUR PRN
Status: DISCONTINUED | OUTPATIENT
Start: 2022-10-12 | End: 2022-10-17

## 2022-10-12 RX ORDER — DEXAMETHASONE SODIUM PHOSPHATE 4 MG/ML
VIAL (ML) INJECTION AS NEEDED
Status: DISCONTINUED | OUTPATIENT
Start: 2022-10-12 | End: 2022-10-12 | Stop reason: SURG

## 2022-10-12 RX ORDER — PHENYLEPHRINE HCL 10 MG/ML
VIAL (ML) INJECTION AS NEEDED
Status: DISCONTINUED | OUTPATIENT
Start: 2022-10-12 | End: 2022-10-12 | Stop reason: SURG

## 2022-10-12 RX ORDER — HYDROMORPHONE HYDROCHLORIDE 1 MG/ML
0.2 INJECTION, SOLUTION INTRAMUSCULAR; INTRAVENOUS; SUBCUTANEOUS EVERY 5 MIN PRN
Status: DISCONTINUED | OUTPATIENT
Start: 2022-10-12 | End: 2022-10-12 | Stop reason: HOSPADM

## 2022-10-12 RX ORDER — LIDOCAINE HYDROCHLORIDE 10 MG/ML
INJECTION, SOLUTION EPIDURAL; INFILTRATION; INTRACAUDAL; PERINEURAL AS NEEDED
Status: DISCONTINUED | OUTPATIENT
Start: 2022-10-12 | End: 2022-10-12 | Stop reason: SURG

## 2022-10-12 RX ORDER — ONDANSETRON 2 MG/ML
4 INJECTION INTRAMUSCULAR; INTRAVENOUS EVERY 6 HOURS PRN
Status: DISCONTINUED | OUTPATIENT
Start: 2022-10-12 | End: 2022-10-12 | Stop reason: HOSPADM

## 2022-10-12 RX ORDER — METOCLOPRAMIDE HYDROCHLORIDE 5 MG/ML
5 INJECTION INTRAMUSCULAR; INTRAVENOUS EVERY 8 HOURS PRN
Status: DISCONTINUED | OUTPATIENT
Start: 2022-10-12 | End: 2022-10-12 | Stop reason: HOSPADM

## 2022-10-12 RX ORDER — SODIUM CHLORIDE, SODIUM LACTATE, POTASSIUM CHLORIDE, CALCIUM CHLORIDE 600; 310; 30; 20 MG/100ML; MG/100ML; MG/100ML; MG/100ML
INJECTION, SOLUTION INTRAVENOUS CONTINUOUS
Status: DISCONTINUED | OUTPATIENT
Start: 2022-10-12 | End: 2022-10-12 | Stop reason: HOSPADM

## 2022-10-12 RX ORDER — NALOXONE HYDROCHLORIDE 0.4 MG/ML
80 INJECTION, SOLUTION INTRAMUSCULAR; INTRAVENOUS; SUBCUTANEOUS AS NEEDED
Status: DISCONTINUED | OUTPATIENT
Start: 2022-10-12 | End: 2022-10-12 | Stop reason: HOSPADM

## 2022-10-12 RX ORDER — MEPERIDINE HYDROCHLORIDE 25 MG/ML
12.5 INJECTION INTRAMUSCULAR; INTRAVENOUS; SUBCUTANEOUS AS NEEDED
Status: DISCONTINUED | OUTPATIENT
Start: 2022-10-12 | End: 2022-10-12 | Stop reason: HOSPADM

## 2022-10-12 RX ORDER — MIDAZOLAM HYDROCHLORIDE 1 MG/ML
1 INJECTION INTRAMUSCULAR; INTRAVENOUS EVERY 5 MIN PRN
Status: DISCONTINUED | OUTPATIENT
Start: 2022-10-12 | End: 2022-10-12 | Stop reason: HOSPADM

## 2022-10-12 RX ORDER — LIDOCAINE HYDROCHLORIDE 10 MG/ML
INJECTION, SOLUTION INFILTRATION; PERINEURAL AS NEEDED
Status: DISCONTINUED | OUTPATIENT
Start: 2022-10-12 | End: 2022-10-12 | Stop reason: HOSPADM

## 2022-10-12 RX ORDER — HYDROMORPHONE HYDROCHLORIDE 1 MG/ML
0.4 INJECTION, SOLUTION INTRAMUSCULAR; INTRAVENOUS; SUBCUTANEOUS EVERY 5 MIN PRN
Status: DISCONTINUED | OUTPATIENT
Start: 2022-10-12 | End: 2022-10-12 | Stop reason: HOSPADM

## 2022-10-12 RX ORDER — HYDROMORPHONE HYDROCHLORIDE 1 MG/ML
INJECTION, SOLUTION INTRAMUSCULAR; INTRAVENOUS; SUBCUTANEOUS
Status: COMPLETED
Start: 2022-10-12 | End: 2022-10-12

## 2022-10-12 RX ORDER — BUPIVACAINE HYDROCHLORIDE 5 MG/ML
INJECTION, SOLUTION EPIDURAL; INTRACAUDAL AS NEEDED
Status: DISCONTINUED | OUTPATIENT
Start: 2022-10-12 | End: 2022-10-12 | Stop reason: HOSPADM

## 2022-10-12 RX ORDER — ACETAMINOPHEN 500 MG
1000 TABLET ORAL ONCE AS NEEDED
Status: DISCONTINUED | OUTPATIENT
Start: 2022-10-12 | End: 2022-10-12 | Stop reason: HOSPADM

## 2022-10-12 RX ORDER — HYDROCODONE BITARTRATE AND ACETAMINOPHEN 5; 325 MG/1; MG/1
2 TABLET ORAL ONCE AS NEEDED
Status: DISCONTINUED | OUTPATIENT
Start: 2022-10-12 | End: 2022-10-12 | Stop reason: HOSPADM

## 2022-10-12 RX ORDER — HYDROMORPHONE HYDROCHLORIDE 1 MG/ML
0.6 INJECTION, SOLUTION INTRAMUSCULAR; INTRAVENOUS; SUBCUTANEOUS EVERY 5 MIN PRN
Status: DISCONTINUED | OUTPATIENT
Start: 2022-10-12 | End: 2022-10-12 | Stop reason: HOSPADM

## 2022-10-12 RX ORDER — SODIUM CHLORIDE 9 MG/ML
INJECTION, SOLUTION INTRAVENOUS CONTINUOUS PRN
Status: DISCONTINUED | OUTPATIENT
Start: 2022-10-12 | End: 2022-10-12 | Stop reason: SURG

## 2022-10-12 RX ORDER — HYDROCODONE BITARTRATE AND ACETAMINOPHEN 5; 325 MG/1; MG/1
1 TABLET ORAL ONCE AS NEEDED
Status: DISCONTINUED | OUTPATIENT
Start: 2022-10-12 | End: 2022-10-12 | Stop reason: HOSPADM

## 2022-10-12 RX ADMIN — PHENYLEPHRINE HCL 100 MCG: 10 MG/ML VIAL (ML) INJECTION at 19:41:00

## 2022-10-12 RX ADMIN — SODIUM CHLORIDE: 9 INJECTION, SOLUTION INTRAVENOUS at 19:02:00

## 2022-10-12 RX ADMIN — ONDANSETRON 4 MG: 2 INJECTION INTRAMUSCULAR; INTRAVENOUS at 19:24:00

## 2022-10-12 RX ADMIN — DEXAMETHASONE SODIUM PHOSPHATE 4 MG: 4 MG/ML VIAL (ML) INJECTION at 19:24:00

## 2022-10-12 RX ADMIN — ROCURONIUM BROMIDE 30 MG: 10 INJECTION, SOLUTION INTRAVENOUS at 19:02:00

## 2022-10-12 RX ADMIN — LIDOCAINE HYDROCHLORIDE 50 MG: 10 INJECTION, SOLUTION EPIDURAL; INFILTRATION; INTRACAUDAL; PERINEURAL at 19:01:00

## 2022-10-12 NOTE — PLAN OF CARE
Pt denies any abdominal discomfort at present. VSS, afebrile. Abdomen remains mildly distended, soft, BS present x4. Pt denies nausea, states not passing flatus yet. Pt ambulatory to toilet with SBA, voiding freely. NPO, NG to LIS with mod amt of greenish/brownish output. Received a call from xray, another abdominal xray will be done in AM. Pt updated with plan of care, verbalized understanding.

## 2022-10-12 NOTE — PLAN OF CARE
Alert and oriented x3. VSS. On RA. . IS encouraged. SCDs on BLE. NPO. NGT to left nare intact draining out green output set to low intermittent suction. Denies pain and nausea at this time. Up with standby assist. Voiding freely. Plan is exploratory laparotomy today. Call light within reach.

## 2022-10-13 PROCEDURE — 97530 THERAPEUTIC ACTIVITIES: CPT

## 2022-10-13 PROCEDURE — 97162 PT EVAL MOD COMPLEX 30 MIN: CPT

## 2022-10-13 PROCEDURE — C9113 INJ PANTOPRAZOLE SODIUM, VIA: HCPCS | Performed by: SURGERY

## 2022-10-13 NOTE — ANESTHESIA PROCEDURE NOTES
Airway  Date/Time: 10/12/2022 7:06 PM  Urgency: elective    Airway not difficult    General Information and Staff    Patient location during procedure: OR  Anesthesiologist: Bobbi Rosas MD  Performed: anesthesiologist     Indications and Patient Condition  Indications for airway management: anesthesia  Sedation level: deep  Preoxygenated: yes  Patient position: sniffing  Mask difficulty assessment: 1 - vent by mask  Planned trial extubation    Final Airway Details  Final airway type: endotracheal airway      Successful airway: ETT  Cuffed: yes   Successful intubation technique: direct laryngoscopy  Endotracheal tube insertion site: oral  Blade size: #3  ETT size (mm): 7.0    Cormack-Lehane Classification: grade IIA - partial view of glottis  Placement verified by: chest auscultation and capnometry   Measured from: lips  ETT to lips (cm): 20  Number of attempts at approach: 1

## 2022-10-13 NOTE — PLAN OF CARE
A&Ox4. VSS. RA. . Pt slightly lethargic but easy to awaken. Resp: Clear bilaterally  GI: Abdomen soft, rounded, tender. Hypoactive bowel sounds, not passing gas. : Mi catheter in place  Pain controlled with PRN pain medications  Up with standby assist.  Drains: Mi catheter clean dry and intact with securing device. No loops or kinks noted. Urine flowing freely. NGT secured at [address] Suction to low intermittent. Brown output. Left nare clean dry and intact. Incisions: Midline incision, JAZZY site. Aquacel in place, no drainage noted  Diet: NPO  Denies nausea. IVF running per order. All appropriate safety measures in place. All questions and concerns addressed. Will continue to monitor     Problem: PAIN - ADULT  Goal: Verbalizes/displays adequate comfort level or patient's stated pain goal  Description: INTERVENTIONS:  - Encourage pt to monitor pain and request assistance  - Assess pain using appropriate pain scale  - Administer analgesics based on type and severity of pain and evaluate response  - Implement non-pharmacological measures as appropriate and evaluate response  - Consider cultural and social influences on pain and pain management  - Manage/alleviate anxiety  - Utilize distraction and/or relaxation techniques  - Monitor for opioid side effects  - Notify MD/LIP if interventions unsuccessful or patient reports new pain  - Anticipate increased pain with activity and pre-medicate as appropriate  Outcome: Progressing     Problem: SAFETY ADULT - FALL  Goal: Free from fall injury  Description: INTERVENTIONS:  - Assess pt frequently for physical needs  - Identify cognitive and physical deficits and behaviors that affect risk of falls.   - [place] fall precautions as indicated by assessment.  - Educate pt/family on patient safety including physical limitations  - Instruct pt to call for assistance with activity based on assessment  - Modify environment to reduce risk of injury  - Provide assistive devices as appropriate  - Consider OT/PT consult to assist with strengthening/mobility  - Encourage toileting schedule  Outcome: Progressing

## 2022-10-13 NOTE — OPERATIVE REPORT
[facility]                                                         Operative Note    [place] Location: [facility] Perioperative   CSN [id number] MRN [id number]   Admission Date 10/9/2022 Procedure Date 10/12/2022   Attending Physician Jael Nolasco MD Procedure Physician Paola Nunez MD     Pre-Operative Diagnosis: Bowel obstruction [facility]) [L47.668]     Post-Operative Diagnosis: Bowel obstruction [facility]) [L38.153]    Procedure Performed: EXPLORATORY LAPAROTOMY, LYSIS OF ADHESIONS    Surgeon: Paola Nunez MD     Assistant(s):  Surgical Assistant.: NOLBERTO Prado   The surgical Assistant was necessary for this procedure. The assistant was involved in patient positioning, instrument exchange, improving exposure optimizing visualization of patient's safety, and closure. The duties of the assistant allowed for reduced risk of the performance of this procedure and care of this patient         Anesthesia: General       Complications: none       Estimated Blood Loss: Blood Output: 10 mL (10/12/2022  7:39 PM)              Operative Summary:     Patient was taken to the operating placed in a supine position. She was prepped and draped in usual fashion and placed under general anesthesia. Midline incision with a scalpel. Dissection was carried down to expose the fascia which was incised and the abdomen was entered. There are multiple loops of small bowel present that were significantly dilated. [place] retractor was placed. Bowel was eviscerated out of the abdominal cavity and the dilated bowel was tracked down to an area of obstruction in the pelvis. There was a definite transition point with a loop of small bowel adhered within an adhesion. The adhesion was sharply taken down freeing the small bowel out of the pelvis and bringing up into the wound. The bowel distal this was fully decompressed all the way to the cecum.   I then ran the bowel back from the cecum to the ligament of Treitz and revealed the entire length to be patent. The area of obstruction was a short segment loop that was initially dusky but once it was freed and the adhesion was taken away from the mesenteric vasculature the bowel pinked up and appeared to be viable during the middle and end of the case. NG tube was palpated to be in the stomach. I did milked back contents of the small bowel into the stomach and evacuated about 2000 cc of succus into the NG tube. At this time the abdomen was irrigated copiously with saline solution the fluid was aspirated. Lap counts and needle counts were correct. A bilateral transabdominal plane block was performed by infusing 30 cc of a mixture of half percent Marcaine 1% Xylocaine in the transabdominal plane bilaterally. This was followed by reapproximating the fascia with O looped PDS suture followed by closure of the dermis and skin with absorbable suture.   Patient was awoken taken recovery in satisfactory condition        Milton Valenzuela MD  10/12/2022  7:59 PM

## 2022-10-13 NOTE — PLAN OF CARE
Pt is oriented x4, can be confused at times. Lungs are clear bilaterally on room air. Bowel sounds are hypoactive. Pt denies nausea. NG is marked at 70 with no output overnight. Placement was verified with auscultation. Pt denies nausea. IVF infusing. Midline incision to abdomen with Aquacel is clean dry and intact. Mi is draining yellow urine. Plan of care reviewed. All questions answered.      Problem: Patient/Family Goals  Goal: Patient/Family Long Term Goal  Description: Patient's Long Term Goal: Discharge home    Interventions:  - NGT LIS  - NPO  - IVF  - Pain management   - See additional Care Plan goals for specific interventions  10/13/2022 0825 by Cherylann Goldberg, RN  Outcome: Progressing  10/13/2022 0823 by Cherylann Goldberg, RN  Outcome: Progressing  Goal: Patient/Family Short Term Goal  Description: Patient's Short Term Goal: Pain management     Interventions:   - NGT   - NPO  - IV pain medication   - See additional Care Plan goals for specific interventions  10/13/2022 0825 by Cherylann Goldberg, RN  Outcome: Progressing  10/13/2022 0823 by Cherylann Goldberg, RN  Outcome: Progressing     Problem: PAIN - ADULT  Goal: Verbalizes/displays adequate comfort level or patient's stated pain goal  Description: INTERVENTIONS:  - Encourage pt to monitor pain and request assistance  - Assess pain using appropriate pain scale  - Administer analgesics based on type and severity of pain and evaluate response  - Implement non-pharmacological measures as appropriate and evaluate response  - Consider cultural and social influences on pain and pain management  - Manage/alleviate anxiety  - Utilize distraction and/or relaxation techniques  - Monitor for opioid side effects  - Notify MD/LIP if interventions unsuccessful or patient reports new pain  - Anticipate increased pain with activity and pre-medicate as appropriate  10/13/2022 0825 by Cherylann Goldberg, RN  Outcome: Progressing  10/13/2022 0823 by Cherylann Goldberg, RN  Outcome: Progressing Problem: SAFETY ADULT - FALL  Goal: Free from fall injury  Description: INTERVENTIONS:  - Assess pt frequently for physical needs  - Identify cognitive and physical deficits and behaviors that affect risk of falls.   - [place] fall precautions as indicated by assessment.  - Educate pt/family on patient safety including physical limitations  - Instruct pt to call for assistance with activity based on assessment  - Modify environment to reduce risk of injury  - Provide assistive devices as appropriate  - Consider OT/PT consult to assist with strengthening/mobility  - Encourage toileting schedule  10/13/2022 0825 by Rebekah Eric RN  Outcome: Progressing  10/13/2022 0823 by Rebekah Eric RN  Outcome: Progressing

## 2022-10-13 NOTE — CM/SW NOTE
MSW met with pt and her dtr at bedside. Plan is home and family will provide care support. Pt wants [facility].  [facility] referral sent and pending

## 2022-10-14 PROCEDURE — C9113 INJ PANTOPRAZOLE SODIUM, VIA: HCPCS | Performed by: SURGERY

## 2022-10-14 PROCEDURE — 97116 GAIT TRAINING THERAPY: CPT

## 2022-10-14 PROCEDURE — 97530 THERAPEUTIC ACTIVITIES: CPT

## 2022-10-14 NOTE — PLAN OF CARE
A&Ox4. VSS. RA. Denies chest pain and SOB. Telemetry: NSR. GI: Abdomen soft, distended. Denies passing gas. Denies nausea. : Voids. Pain controlled with PRN pain medications. Up with standby assist.   Incisions: Midline incision with skin glue CDI, gauze applied over site for pt comfort. Diet: NPO with ice chips, gum, and hard candy. NGT is clamped since 0930 for 6 hours, will check residule at 1530. Pt instructed to report nausea or incrase in pain. IVF running per order. Pt resting in bed and will get to chair later. All appropriate safety measures in place. All questions and concerns addressed. Will continue to monitor.      Problem: Patient/Family Goals  Goal: Patient/Family Long Term Goal  Description: Patient's Long Term Goal: Discharge home    Interventions:  - NGT LIS  - NPO  - IVF  - Pain management   - See additional Care Plan goals for specific interventions  Outcome: Progressing  Goal: Patient/Family Short Term Goal  Description: Patient's Short Term Goal: Pain management     Interventions:   - NGT   - NPO  - IV pain medication   - See additional Care Plan goals for specific interventions  Outcome: Progressing     Problem: PAIN - ADULT  Goal: Verbalizes/displays adequate comfort level or patient's stated pain goal  Description: INTERVENTIONS:  - Encourage pt to monitor pain and request assistance  - Assess pain using appropriate pain scale  - Administer analgesics based on type and severity of pain and evaluate response  - Implement non-pharmacological measures as appropriate and evaluate response  - Consider cultural and social influences on pain and pain management  - Manage/alleviate anxiety  - Utilize distraction and/or relaxation techniques  - Monitor for opioid side effects  - Notify MD/LIP if interventions unsuccessful or patient reports new pain  - Anticipate increased pain with activity and pre-medicate as appropriate  Outcome: Progressing     Problem: SAFETY ADULT - FALL  Goal: Free from fall injury  Description: INTERVENTIONS:  - Assess pt frequently for physical needs  - Identify cognitive and physical deficits and behaviors that affect risk of falls.   - [place] fall precautions as indicated by assessment.  - Educate pt/family on patient safety including physical limitations  - Instruct pt to call for assistance with activity based on assessment  - Modify environment to reduce risk of injury  - Provide assistive devices as appropriate  - Consider OT/PT consult to assist with strengthening/mobility  - Encourage toileting schedule  Outcome: Progressing

## 2022-10-14 NOTE — HOME CARE LIAISON
Received referral via [facility] for Home Health services. Spoke w/ patient and her son Cleda Mohs and [facility], choice is [facility]ette [facility]. Agency reserved in [facility] and contact information placed on AVS.Financial interest disclosure provided.  Notified JOB Valdivia

## 2022-10-14 NOTE — PLAN OF CARE
Pt is alert and oriented, forgetful at times,ra,tele-nsr,vss  NGT to LIS,no output,placement verified by ausculation, pt abdomen is rounded, BS hypoactive, pt burping,no gas  NPO,IVF infusing  Mild abd pain,declines pain meds when offered  Midline incision with aquacel,dry and intact  Pt is voiding, up standby assist  Updated on the plan of care, call light in reach,rounding    Problem: Patient/Family Goals  Goal: Patient/Family Long Term Goal  Description: Patient's Long Term Goal: Discharge home    Interventions:  - NGT LIS  - NPO  - IVF  - Pain management   - See additional Care Plan goals for specific interventions  Outcome: Progressing  Goal: Patient/Family Short Term Goal  Description: Patient's Short Term Goal: Pain management     Interventions:   - NGT   - NPO  - IV pain medication   - See additional Care Plan goals for specific interventions  Outcome: Progressing     Problem: PAIN - ADULT  Goal: Verbalizes/displays adequate comfort level or patient's stated pain goal  Description: INTERVENTIONS:  - Encourage pt to monitor pain and request assistance  - Assess pain using appropriate pain scale  - Administer analgesics based on type and severity of pain and evaluate response  - Implement non-pharmacological measures as appropriate and evaluate response  - Consider cultural and social influences on pain and pain management  - Manage/alleviate anxiety  - Utilize distraction and/or relaxation techniques  - Monitor for opioid side effects  - Notify MD/LIP if interventions unsuccessful or patient reports new pain  - Anticipate increased pain with activity and pre-medicate as appropriate  Outcome: Progressing     Problem: SAFETY ADULT - FALL  Goal: Free from fall injury  Description: INTERVENTIONS:  - Assess pt frequently for physical needs  - Identify cognitive and physical deficits and behaviors that affect risk of falls.   - [place] fall precautions as indicated by assessment.  - Educate pt/family on patient safety including physical limitations  - Instruct pt to call for assistance with activity based on assessment  - Modify environment to reduce risk of injury  - Provide assistive devices as appropriate  - Consider OT/PT consult to assist with strengthening/mobility  - Encourage toileting schedule  Outcome: Progressing

## 2022-10-15 LAB
ANION GAP SERPL CALC-SCNC: 5 MMOL/L (ref 0–18)
BUN BLD-MCNC: 10 MG/DL (ref 7–18)
CALCIUM BLD-MCNC: 7.9 MG/DL (ref 8.5–10.1)
CHLORIDE SERPL-SCNC: 114 MMOL/L (ref 98–112)
CO2 SERPL-SCNC: 25 MMOL/L (ref 21–32)
CREAT BLD-MCNC: 0.5 MG/DL
ERYTHROCYTE [DISTWIDTH] IN BLOOD BY AUTOMATED COUNT: 13.8 %
GFR SERPLBLD BASED ON 1.73 SQ M-ARVRAT: 91 ML/MIN/1.73M2 (ref 60–?)
GLUCOSE BLD-MCNC: 104 MG/DL (ref 70–99)
HCT VFR BLD AUTO: 41.1 %
HGB BLD-MCNC: 13.2 G/DL
MAGNESIUM SERPL-MCNC: 2.1 MG/DL (ref 1.6–2.6)
MCH RBC QN AUTO: 28.5 PG (ref 26–34)
MCHC RBC AUTO-ENTMCNC: 32.1 G/DL (ref 31–37)
MCV RBC AUTO: 88.8 FL
OSMOLALITY SERPL CALC.SUM OF ELEC: 297 MOSM/KG (ref 275–295)
PLATELET # BLD AUTO: 213 10(3)UL (ref 150–450)
POTASSIUM SERPL-SCNC: 3.6 MMOL/L (ref 3.5–5.1)
RBC # BLD AUTO: 4.63 X10(6)UL
SODIUM SERPL-SCNC: 144 MMOL/L (ref 136–145)
WBC # BLD AUTO: 6.3 X10(3) UL (ref 4–11)

## 2022-10-15 PROCEDURE — C9113 INJ PANTOPRAZOLE SODIUM, VIA: HCPCS | Performed by: SURGERY

## 2022-10-15 PROCEDURE — 80048 BASIC METABOLIC PNL TOTAL CA: CPT | Performed by: INTERNAL MEDICINE

## 2022-10-15 PROCEDURE — 85027 COMPLETE CBC AUTOMATED: CPT | Performed by: INTERNAL MEDICINE

## 2022-10-15 PROCEDURE — 83735 ASSAY OF MAGNESIUM: CPT | Performed by: INTERNAL MEDICINE

## 2022-10-15 NOTE — PLAN OF CARE
Problem: Patient/Family Goals  Goal: Patient/Family Long Term Goal  Description: Patient's Long Term Goal: Discharge home    Interventions:  - Tolerate clear liquid diet  - IVF  - Pain management   - See additional Care Plan goals for specific interventions  Outcome: Progressing  Goal: Patient/Family Short Term Goal  Description: Patient's Short Term Goal: Pain management     Interventions:   - ambulation  - PO pain medication  - IV pain medication   - See additional Care Plan goals for specific interventions  Outcome: Progressing     Problem: PAIN - ADULT  Goal: Verbalizes/displays adequate comfort level or patient's stated pain goal  Description: INTERVENTIONS:  - Encourage pt to monitor pain and request assistance  - Assess pain using appropriate pain scale  - Administer analgesics based on type and severity of pain and evaluate response  - Implement non-pharmacological measures as appropriate and evaluate response  - Consider cultural and social influences on pain and pain management  - Manage/alleviate anxiety  - Utilize distraction and/or relaxation techniques  - Monitor for opioid side effects  - Notify MD/LIP if interventions unsuccessful or patient reports new pain  - Anticipate increased pain with activity and pre-medicate as appropriate  Outcome: Progressing     Problem: SAFETY ADULT - FALL  Goal: Free from fall injury  Description: INTERVENTIONS:  - Assess pt frequently for physical needs  - Identify cognitive and physical deficits and behaviors that affect risk of falls.   - [place] fall precautions as indicated by assessment.  - Educate pt/family on patient safety including physical limitations  - Instruct pt to call for assistance with activity based on assessment  - Modify environment to reduce risk of injury  - Provide assistive devices as appropriate  - Consider OT/PT consult to assist with strengthening/mobility  - Encourage toileting schedule  Outcome: Progressing

## 2022-10-15 NOTE — PROGRESS NOTES
Pt is alert, forgetful at times but easy to redirect. 2L O2 overnight, Sinus rhythm on Tele in the 70's. VSS,pt vois,tolerating clears. Abdomen is still rounded,soft,no gas per pt. Midline incision shiv,dermabonf,c/d/i. Pain is minimal per pt and declines pain meds. Pt was updated on the plan of care,call light in reach,frequently rounding

## 2022-10-16 LAB — C DIFF TOX B STL QL: NEGATIVE

## 2022-10-16 PROCEDURE — C9113 INJ PANTOPRAZOLE SODIUM, VIA: HCPCS | Performed by: SURGERY

## 2022-10-16 PROCEDURE — 87493 C DIFF AMPLIFIED PROBE: CPT | Performed by: SURGERY

## 2022-10-16 NOTE — PLAN OF CARE
Problem: Patient/Family Goals  Goal: Patient/Family Long Term Goal  Description: Patient's Long Term Goal: Discharge home    Interventions:  - Tolerate clear liquid diet  - IVF  - Pain management   - See additional Care Plan goals for specific interventions  10/15/2022 2315 by Eduard Kumar RN  Outcome: Progressing  10/15/2022 2314 by Eduard Kumar RN  Outcome: Progressing  Goal: Patient/Family Short Term Goal  Description: Patient's Short Term Goal: Pain management     Interventions:   - ambulation  - PO pain medication  - IV pain medication   - See additional Care Plan goals for specific interventions  10/15/2022 2315 by Eduard Kumar RN  Outcome: Progressing  10/15/2022 2314 by Eduard Kumar RN  Outcome: Progressing     Problem: PAIN - ADULT  Goal: Verbalizes/displays adequate comfort level or patient's stated pain goal  Description: INTERVENTIONS:  - Encourage pt to monitor pain and request assistance  - Assess pain using appropriate pain scale  - Administer analgesics based on type and severity of pain and evaluate response  - Implement non-pharmacological measures as appropriate and evaluate response  - Consider cultural and social influences on pain and pain management  - Manage/alleviate anxiety  - Utilize distraction and/or relaxation techniques  - Monitor for opioid side effects  - Notify MD/LIP if interventions unsuccessful or patient reports new pain  - Anticipate increased pain with activity and pre-medicate as appropriate  10/15/2022 2315 by Eduard Kumar RN  Outcome: Progressing  10/15/2022 2314 by Eduard Kumar RN  Outcome: Progressing     Problem: SAFETY ADULT - FALL  Goal: Free from fall injury  Description: INTERVENTIONS:  - Assess pt frequently for physical needs  - Identify cognitive and physical deficits and behaviors that affect risk of falls.   - [place] fall precautions as indicated by assessment.  - Educate pt/family on patient safety including physical limitations  - Instruct pt to call for assistance with activity based on assessment  - Modify environment to reduce risk of injury  - Provide assistive devices as appropriate  - Consider OT/PT consult to assist with strengthening/mobility  - Encourage toileting schedule  10/15/2022 2315 by Karen Adan RN  Outcome: Progressing  10/15/2022 2314 by Karen Adan RN  Outcome: Progressing     Problem: RISK FOR INFECTION - ADULT  Goal: Absence of fever/infection during anticipated neutropenic period  Description: INTERVENTIONS  - Monitor WBC  - Administer growth factors as ordered  - Implement neutropenic guidelines  Outcome: Progressing

## 2022-10-16 NOTE — PLAN OF CARE
Problem: Patient/Family Goals  Goal: Patient/Family Long Term Goal  Description: Patient's Long Term Goal: Discharge home    Interventions:  - Tolerate clear liquid diet  - IVF  - Pain management   - See additional Care Plan goals for specific interventions  Outcome: Progressing  Goal: Patient/Family Short Term Goal  Description: Patient's Short Term Goal: Pain management     Interventions:   - ambulation  - PO pain medication  - IV pain medication   - See additional Care Plan goals for specific interventions  Outcome: Progressing     Problem: SAFETY ADULT - FALL  Goal: Free from fall injury  Description: INTERVENTIONS:  - Assess pt frequently for physical needs  - Identify cognitive and physical deficits and behaviors that affect risk of falls.   - [place] fall precautions as indicated by assessment.  - Educate pt/family on patient safety including physical limitations  - Instruct pt to call for assistance with activity based on assessment  - Modify environment to reduce risk of injury  - Provide assistive devices as appropriate  - Consider OT/PT consult to assist with strengthening/mobility  - Encourage toileting schedule  Outcome: Progressing     Problem: RISK FOR INFECTION - ADULT  Goal: Absence of fever/infection during anticipated neutropenic period  Description: INTERVENTIONS  - Monitor WBC  - Administer growth factors as ordered  - Implement neutropenic guidelines  Outcome: Progressing

## 2022-10-16 NOTE — PROGRESS NOTES
Pt a/o x 4, occasionally forgetful and anxious. Passing gas. Had three loose stools yesterday, abd softly rounded. Tolerated clear liquid diet. Ivf. SR/ ST on tele, o2 sat adequate on R. A. adin cont to monitor.

## 2022-10-17 VITALS
HEART RATE: 103 BPM | TEMPERATURE: 98 F | WEIGHT: 120 LBS | RESPIRATION RATE: 19 BRPM | BODY MASS INDEX: 22.66 KG/M2 | OXYGEN SATURATION: 100 % | DIASTOLIC BLOOD PRESSURE: 55 MMHG | SYSTOLIC BLOOD PRESSURE: 108 MMHG | HEIGHT: 61 IN

## 2022-10-17 PROCEDURE — 97166 OT EVAL MOD COMPLEX 45 MIN: CPT

## 2022-10-17 PROCEDURE — 97535 SELF CARE MNGMENT TRAINING: CPT

## 2022-10-17 PROCEDURE — C9113 INJ PANTOPRAZOLE SODIUM, VIA: HCPCS | Performed by: SURGERY

## 2022-10-17 NOTE — PLAN OF CARE
PT axox4, resting in bed, has anxiety and feels like she can't get comfortable, pt has small amount of vomit gave zofran, abdomen soft non-tender, belching, NSR on tele, pt ambulating to the bathroom to void with assist, pt care plan updated with patient will continue to monitor the patient. Problem: Patient/Family Goals  Goal: Patient/Family Long Term Goal  Description: Patient's Long Term Goal: Discharge home    Interventions:  - Tolerate clear liquid diet  - IVF  - Pain management   - See additional Care Plan goals for specific interventions  Outcome: Progressing  Goal: Patient/Family Short Term Goal  Description: Patient's Short Term Goal: Pain management     Interventions:   - ambulation  - PO pain medication  - IV pain medication   - See additional Care Plan goals for specific interventions  Outcome: Progressing     Problem: PAIN - ADULT  Goal: Verbalizes/displays adequate comfort level or patient's stated pain goal  Description: INTERVENTIONS:  - Encourage pt to monitor pain and request assistance  - Assess pain using appropriate pain scale  - Administer analgesics based on type and severity of pain and evaluate response  - Implement non-pharmacological measures as appropriate and evaluate response  - Consider cultural and social influences on pain and pain management  - Manage/alleviate anxiety  - Utilize distraction and/or relaxation techniques  - Monitor for opioid side effects  - Notify MD/LIP if interventions unsuccessful or patient reports new pain  - Anticipate increased pain with activity and pre-medicate as appropriate  Outcome: Progressing     Problem: SAFETY ADULT - FALL  Goal: Free from fall injury  Description: INTERVENTIONS:  - Assess pt frequently for physical needs  - Identify cognitive and physical deficits and behaviors that affect risk of falls.   - [place] fall precautions as indicated by assessment.  - Educate pt/family on patient safety including physical limitations  - Instruct pt to call for assistance with activity based on assessment  - Modify environment to reduce risk of injury  - Provide assistive devices as appropriate  - Consider OT/PT consult to assist with strengthening/mobility  - Encourage toileting schedule  Outcome: Progressing     Problem: RISK FOR INFECTION - ADULT  Goal: Absence of fever/infection during anticipated neutropenic period  Description: INTERVENTIONS  - Monitor WBC  - Administer growth factors as ordered  - Implement neutropenic guidelines  Outcome: Progressing

## 2022-10-17 NOTE — PLAN OF CARE
Problem: Patient/Family Goals  Goal: Patient/Family Long Term Goal  Description: Patient's Long Term Goal: Discharge home    Interventions:  - Tolerate low fiber diet  - IVF  - Pain management   - organize home health  - See additional Care Plan goals for specific interventions  Outcome: Progressing  Goal: Patient/Family Short Term Goal  Description: Patient's Short Term Goal: Pain management     Interventions:   - ambulation  - PO pain medication  - IV pain medication   - See additional Care Plan goals for specific interventions  Outcome: Progressing     Problem: PAIN - ADULT  Goal: Verbalizes/displays adequate comfort level or patient's stated pain goal  Description: INTERVENTIONS:  - Encourage pt to monitor pain and request assistance  - Assess pain using appropriate pain scale  - Administer analgesics based on type and severity of pain and evaluate response  - Implement non-pharmacological measures as appropriate and evaluate response  - Consider cultural and social influences on pain and pain management  - Manage/alleviate anxiety  - Utilize distraction and/or relaxation techniques  - Monitor for opioid side effects  - Notify MD/LIP if interventions unsuccessful or patient reports new pain  - Anticipate increased pain with activity and pre-medicate as appropriate  Outcome: Progressing     Problem: SAFETY ADULT - FALL  Goal: Free from fall injury  Description: INTERVENTIONS:  - Assess pt frequently for physical needs  - Identify cognitive and physical deficits and behaviors that affect risk of falls.   - [place] fall precautions as indicated by assessment.  - Educate pt/family on patient safety including physical limitations  - Instruct pt to call for assistance with activity based on assessment  - Modify environment to reduce risk of injury  - Provide assistive devices as appropriate  - Consider OT/PT consult to assist with strengthening/mobility  - Encourage toileting schedule  Outcome: Progressing Problem: RISK FOR INFECTION - ADULT  Goal: Absence of fever/infection during anticipated neutropenic period  Description: INTERVENTIONS  - Monitor WBC  - Administer growth factors as ordered  - Implement neutropenic guidelines  Outcome: Progressing

## 2022-10-17 NOTE — PROGRESS NOTES
** I agree with charting of Keith Bruce. 1330: Walker dispensed to patient from central distribution.

## 2022-10-17 NOTE — CM/SW NOTE
CM spoke to patient/family for follow up discharge planning; [address] [facility] [address] contact information provided. [facility] accepted patient for home health services. CM left message with PT office to request rolling walker provided to patient for home. Family inquiring about private pay rates at local facilities that provide inpatient physical rehab only, CM to follow up. Update:  1600: CM spoke to admissions at [facility] for update, facility can accept patient for ROCIO evaluation. CM spoke to patient/family for update and sent referral via [employer] system, PASRR screening done. 1630:Patient and family to discuss home vs ROCIO for discharge and follow up with CM to finalize dc plan. RN updated. 1700: Patient will discharge to [facility] today, facility can accept patient at 6:30. Patient's son will provide transportation. RN updated on dc plan.      [employer]:  Phone: [phone] [address] [place] [address], [facility], [address] [facility]  [id number]

## 2022-10-17 NOTE — PROGRESS NOTES
NURSING DISCHARGE NOTE    Discharged Nursing home via Car. Accompanied by Family member  Belongings Taken by patient/family     Patient discharge education reviewed with patient and family. IV and tele discontinued. Report called to [facility]. Patient left unit via wheelchair at [facility]     .

## 2023-01-24 ENCOUNTER — LAB REQUISITION (OUTPATIENT)
Dept: LAB | Facility: HOSPITAL | Age: 88
End: 2023-01-24
Payer: MEDICARE

## 2023-01-24 DIAGNOSIS — E78.2 MIXED HYPERLIPIDEMIA: ICD-10-CM

## 2023-01-24 DIAGNOSIS — E03.9 HYPOTHYROIDISM, UNSPECIFIED: ICD-10-CM

## 2023-01-24 DIAGNOSIS — D75.1 SECONDARY POLYCYTHEMIA: ICD-10-CM

## 2023-01-24 LAB
ALBUMIN SERPL-MCNC: 3.7 G/DL (ref 3.4–5)
ALBUMIN/GLOB SERPL: 1.1 {RATIO} (ref 1–2)
ALP LIVER SERPL-CCNC: 62 U/L
ALT SERPL-CCNC: 25 U/L
ANION GAP SERPL CALC-SCNC: 3 MMOL/L (ref 0–18)
AST SERPL-CCNC: 14 U/L (ref 15–37)
BILIRUB SERPL-MCNC: 0.4 MG/DL (ref 0.1–2)
BUN BLD-MCNC: 18 MG/DL (ref 7–18)
CALCIUM BLD-MCNC: 9.4 MG/DL (ref 8.5–10.1)
CHLORIDE SERPL-SCNC: 105 MMOL/L (ref 98–112)
CHOLEST SERPL-MCNC: 184 MG/DL (ref ?–200)
CO2 SERPL-SCNC: 29 MMOL/L (ref 21–32)
CREAT BLD-MCNC: 0.8 MG/DL
ERYTHROCYTE [DISTWIDTH] IN BLOOD BY AUTOMATED COUNT: 13.2 %
GFR SERPLBLD BASED ON 1.73 SQ M-ARVRAT: 71 ML/MIN/1.73M2 (ref 60–?)
GLOBULIN PLAS-MCNC: 3.4 G/DL (ref 2.8–4.4)
GLUCOSE BLD-MCNC: 57 MG/DL (ref 70–99)
HCT VFR BLD AUTO: 49.1 %
HDLC SERPL-MCNC: 67 MG/DL (ref 40–59)
HGB BLD-MCNC: 15.6 G/DL
LDLC SERPL CALC-MCNC: 97 MG/DL (ref ?–100)
MCH RBC QN AUTO: 28.5 PG (ref 26–34)
MCHC RBC AUTO-ENTMCNC: 31.8 G/DL (ref 31–37)
MCV RBC AUTO: 89.8 FL
NONHDLC SERPL-MCNC: 117 MG/DL (ref ?–130)
OSMOLALITY SERPL CALC.SUM OF ELEC: 284 MOSM/KG (ref 275–295)
PLATELET # BLD AUTO: 264 10(3)UL (ref 150–450)
POTASSIUM SERPL-SCNC: 3.9 MMOL/L (ref 3.5–5.1)
PROT SERPL-MCNC: 7.1 G/DL (ref 6.4–8.2)
RBC # BLD AUTO: 5.47 X10(6)UL
SODIUM SERPL-SCNC: 137 MMOL/L (ref 136–145)
TRIGL SERPL-MCNC: 113 MG/DL (ref 30–149)
TSI SER-ACNC: 2.79 MIU/ML (ref 0.36–3.74)
VLDLC SERPL CALC-MCNC: 19 MG/DL (ref 0–30)
WBC # BLD AUTO: 6.9 X10(3) UL (ref 4–11)

## 2023-01-24 PROCEDURE — 84443 ASSAY THYROID STIM HORMONE: CPT | Performed by: FAMILY MEDICINE

## 2023-01-24 PROCEDURE — 80061 LIPID PANEL: CPT | Performed by: FAMILY MEDICINE

## 2023-01-24 PROCEDURE — 80053 COMPREHEN METABOLIC PANEL: CPT | Performed by: FAMILY MEDICINE

## 2023-01-24 PROCEDURE — 85027 COMPLETE CBC AUTOMATED: CPT | Performed by: FAMILY MEDICINE

## 2023-05-30 ENCOUNTER — LAB REQUISITION (OUTPATIENT)
Dept: LAB | Facility: HOSPITAL | Age: 88
End: 2023-05-30
Payer: MEDICARE

## 2023-05-30 DIAGNOSIS — R42 DIZZINESS AND GIDDINESS: ICD-10-CM

## 2023-05-30 DIAGNOSIS — E03.9 HYPOTHYROIDISM, UNSPECIFIED: ICD-10-CM

## 2023-05-30 DIAGNOSIS — R53.82 CHRONIC FATIGUE, UNSPECIFIED: ICD-10-CM

## 2023-05-30 DIAGNOSIS — E78.00 PURE HYPERCHOLESTEROLEMIA, UNSPECIFIED: ICD-10-CM

## 2023-05-30 LAB
ALBUMIN SERPL-MCNC: 3.8 G/DL (ref 3.4–5)
ALBUMIN/GLOB SERPL: 1.2 {RATIO} (ref 1–2)
ALP LIVER SERPL-CCNC: 57 U/L
ALT SERPL-CCNC: 27 U/L
ANION GAP SERPL CALC-SCNC: 5 MMOL/L (ref 0–18)
AST SERPL-CCNC: 19 U/L (ref 15–37)
BILIRUB SERPL-MCNC: 0.5 MG/DL (ref 0.1–2)
BUN BLD-MCNC: 15 MG/DL (ref 7–18)
CALCIUM BLD-MCNC: 8.9 MG/DL (ref 8.5–10.1)
CHLORIDE SERPL-SCNC: 106 MMOL/L (ref 98–112)
CHOLEST SERPL-MCNC: 173 MG/DL (ref ?–200)
CO2 SERPL-SCNC: 28 MMOL/L (ref 21–32)
CREAT BLD-MCNC: 0.7 MG/DL
FASTING PATIENT LIPID ANSWER: YES
FASTING STATUS PATIENT QL REPORTED: YES
GFR SERPLBLD BASED ON 1.73 SQ M-ARVRAT: 83 ML/MIN/1.73M2 (ref 60–?)
GLOBULIN PLAS-MCNC: 3.1 G/DL (ref 2.8–4.4)
GLUCOSE BLD-MCNC: 104 MG/DL (ref 70–99)
HDLC SERPL-MCNC: 72 MG/DL (ref 40–59)
LDLC SERPL CALC-MCNC: 89 MG/DL (ref ?–100)
NONHDLC SERPL-MCNC: 101 MG/DL (ref ?–130)
OSMOLALITY SERPL CALC.SUM OF ELEC: 289 MOSM/KG (ref 275–295)
POTASSIUM SERPL-SCNC: 4.9 MMOL/L (ref 3.5–5.1)
PROT SERPL-MCNC: 6.9 G/DL (ref 6.4–8.2)
SODIUM SERPL-SCNC: 139 MMOL/L (ref 136–145)
TRIGL SERPL-MCNC: 65 MG/DL (ref 30–149)
TSI SER-ACNC: 2.23 MIU/ML (ref 0.36–3.74)
VLDLC SERPL CALC-MCNC: 10 MG/DL (ref 0–30)

## 2023-05-30 PROCEDURE — 80053 COMPREHEN METABOLIC PANEL: CPT | Performed by: INTERNAL MEDICINE

## 2023-05-30 PROCEDURE — 80061 LIPID PANEL: CPT | Performed by: INTERNAL MEDICINE

## 2023-05-30 PROCEDURE — 84443 ASSAY THYROID STIM HORMONE: CPT | Performed by: INTERNAL MEDICINE

## 2023-10-28 ENCOUNTER — APPOINTMENT (OUTPATIENT)
Dept: GENERAL RADIOLOGY | Facility: HOSPITAL | Age: 88
End: 2023-10-28
Attending: EMERGENCY MEDICINE
Payer: MEDICARE

## 2023-10-28 ENCOUNTER — APPOINTMENT (OUTPATIENT)
Dept: MRI IMAGING | Facility: HOSPITAL | Age: 88
End: 2023-10-28
Attending: EMERGENCY MEDICINE
Payer: MEDICARE

## 2023-10-28 ENCOUNTER — HOSPITAL ENCOUNTER (EMERGENCY)
Facility: HOSPITAL | Age: 88
Discharge: HOME | End: 2023-10-28
Attending: EMERGENCY MEDICINE
Payer: MEDICARE

## 2023-10-28 ENCOUNTER — HOSPITAL ENCOUNTER (OUTPATIENT)
Age: 88
Discharge: ANOTHER HOSPITAL | End: 2023-10-28
Payer: MEDICARE

## 2023-10-28 VITALS
TEMPERATURE: 98 F | WEIGHT: 132 LBS | DIASTOLIC BLOOD PRESSURE: 71 MMHG | RESPIRATION RATE: 18 BRPM | BODY MASS INDEX: 24.92 KG/M2 | HEIGHT: 61 IN | OXYGEN SATURATION: 96 % | HEART RATE: 90 BPM | SYSTOLIC BLOOD PRESSURE: 123 MMHG

## 2023-10-28 VITALS
WEIGHT: 100 LBS | TEMPERATURE: 97 F | BODY MASS INDEX: 18.4 KG/M2 | HEIGHT: 62 IN | OXYGEN SATURATION: 98 % | RESPIRATION RATE: 18 BRPM | SYSTOLIC BLOOD PRESSURE: 142 MMHG | DIASTOLIC BLOOD PRESSURE: 82 MMHG | HEART RATE: 91 BPM

## 2023-10-28 DIAGNOSIS — R42 DIZZINESS: Primary | ICD-10-CM

## 2023-10-28 LAB
ALBUMIN SERPL-MCNC: 3.5 G/DL (ref 3.4–5)
ALBUMIN/GLOB SERPL: 1.1 {RATIO} (ref 1–2)
ALP LIVER SERPL-CCNC: 65 U/L
ALT SERPL-CCNC: 26 U/L
ANION GAP SERPL CALC-SCNC: 2 MMOL/L (ref 0–18)
AST SERPL-CCNC: 10 U/L (ref 15–37)
ATRIAL RATE: 76 BPM
ATRIAL RATE: 90 BPM
BASOPHILS # BLD AUTO: 0.05 X10(3) UL (ref 0–0.2)
BASOPHILS NFR BLD AUTO: 0.7 %
BILIRUB SERPL-MCNC: 0.4 MG/DL (ref 0.1–2)
BILIRUB UR QL STRIP.AUTO: NEGATIVE
BUN BLD-MCNC: 16 MG/DL (ref 7–18)
CALCIUM BLD-MCNC: 9.2 MG/DL (ref 8.5–10.1)
CHLORIDE SERPL-SCNC: 106 MMOL/L (ref 98–112)
CLARITY UR REFRACT.AUTO: CLEAR
CO2 SERPL-SCNC: 28 MMOL/L (ref 21–32)
COLOR UR AUTO: COLORLESS
CREAT BLD-MCNC: 0.79 MG/DL
EGFRCR SERPLBLD CKD-EPI 2021: 72 ML/MIN/1.73M2 (ref 60–?)
EOSINOPHIL # BLD AUTO: 0.08 X10(3) UL (ref 0–0.7)
EOSINOPHIL NFR BLD AUTO: 1.1 %
ERYTHROCYTE [DISTWIDTH] IN BLOOD BY AUTOMATED COUNT: 13.3 %
GLOBULIN PLAS-MCNC: 3.3 G/DL (ref 2.8–4.4)
GLUCOSE BLD-MCNC: 115 MG/DL (ref 70–99)
GLUCOSE BLD-MCNC: 120 MG/DL (ref 70–99)
GLUCOSE UR STRIP.AUTO-MCNC: NORMAL MG/DL
HCT VFR BLD AUTO: 46.7 %
HGB BLD-MCNC: 15.8 G/DL
IMM GRANULOCYTES # BLD AUTO: 0.02 X10(3) UL (ref 0–1)
IMM GRANULOCYTES NFR BLD: 0.3 %
KETONES UR STRIP.AUTO-MCNC: NEGATIVE MG/DL
LEUKOCYTE ESTERASE UR QL STRIP.AUTO: NEGATIVE
LYMPHOCYTES # BLD AUTO: 0.91 X10(3) UL (ref 1–4)
LYMPHOCYTES NFR BLD AUTO: 12.7 %
MCH RBC QN AUTO: 29.3 PG (ref 26–34)
MCHC RBC AUTO-ENTMCNC: 33.8 G/DL (ref 31–37)
MCV RBC AUTO: 86.5 FL
MONOCYTES # BLD AUTO: 0.68 X10(3) UL (ref 0.1–1)
MONOCYTES NFR BLD AUTO: 9.5 %
NEUTROPHILS # BLD AUTO: 5.42 X10 (3) UL (ref 1.5–7.7)
NEUTROPHILS # BLD AUTO: 5.42 X10(3) UL (ref 1.5–7.7)
NEUTROPHILS NFR BLD AUTO: 75.7 %
NITRITE UR QL STRIP.AUTO: NEGATIVE
OSMOLALITY SERPL CALC.SUM OF ELEC: 284 MOSM/KG (ref 275–295)
P AXIS: 31 DEGREES
P AXIS: 65 DEGREES
P-R INTERVAL: 152 MS
P-R INTERVAL: 156 MS
PH UR STRIP.AUTO: 7.5 [PH] (ref 5–8)
PLATELET # BLD AUTO: 233 10(3)UL (ref 150–450)
POTASSIUM SERPL-SCNC: 4.5 MMOL/L (ref 3.5–5.1)
PROT SERPL-MCNC: 6.8 G/DL (ref 6.4–8.2)
PROT UR STRIP.AUTO-MCNC: NEGATIVE MG/DL
Q-T INTERVAL: 378 MS
Q-T INTERVAL: 382 MS
QRS DURATION: 126 MS
QRS DURATION: 130 MS
QTC CALCULATION (BEZET): 425 MS
QTC CALCULATION (BEZET): 467 MS
R AXIS: -55 DEGREES
R AXIS: -61 DEGREES
RBC # BLD AUTO: 5.4 X10(6)UL
RBC UR QL AUTO: NEGATIVE
SARS-COV-2 RNA RESP QL NAA+PROBE: NOT DETECTED
SODIUM SERPL-SCNC: 136 MMOL/L (ref 136–145)
SP GR UR STRIP.AUTO: <1.005 (ref 1–1.03)
T AXIS: 71 DEGREES
T AXIS: 88 DEGREES
TROPONIN I HIGH SENSITIVITY: 27 NG/L
UROBILINOGEN UR STRIP.AUTO-MCNC: NORMAL MG/DL
VENTRICULAR RATE: 76 BPM
VENTRICULAR RATE: 90 BPM
WBC # BLD AUTO: 7.2 X10(3) UL (ref 4–11)

## 2023-10-28 PROCEDURE — 93005 ELECTROCARDIOGRAM TRACING: CPT

## 2023-10-28 PROCEDURE — 80053 COMPREHEN METABOLIC PANEL: CPT | Performed by: EMERGENCY MEDICINE

## 2023-10-28 PROCEDURE — 99204 OFFICE O/P NEW MOD 45 MIN: CPT | Performed by: NURSE PRACTITIONER

## 2023-10-28 PROCEDURE — 99285 EMERGENCY DEPT VISIT HI MDM: CPT

## 2023-10-28 PROCEDURE — 93000 ELECTROCARDIOGRAM COMPLETE: CPT | Performed by: NURSE PRACTITIONER

## 2023-10-28 PROCEDURE — 70551 MRI BRAIN STEM W/O DYE: CPT | Performed by: EMERGENCY MEDICINE

## 2023-10-28 PROCEDURE — 84484 ASSAY OF TROPONIN QUANT: CPT | Performed by: EMERGENCY MEDICINE

## 2023-10-28 PROCEDURE — 96361 HYDRATE IV INFUSION ADD-ON: CPT

## 2023-10-28 PROCEDURE — 85025 COMPLETE CBC W/AUTO DIFF WBC: CPT | Performed by: EMERGENCY MEDICINE

## 2023-10-28 PROCEDURE — 93010 ELECTROCARDIOGRAM REPORT: CPT

## 2023-10-28 PROCEDURE — 82962 GLUCOSE BLOOD TEST: CPT

## 2023-10-28 PROCEDURE — 71045 X-RAY EXAM CHEST 1 VIEW: CPT | Performed by: EMERGENCY MEDICINE

## 2023-10-28 PROCEDURE — 81003 URINALYSIS AUTO W/O SCOPE: CPT | Performed by: EMERGENCY MEDICINE

## 2023-10-28 PROCEDURE — 85025 COMPLETE CBC W/AUTO DIFF WBC: CPT

## 2023-10-28 PROCEDURE — 96360 HYDRATION IV INFUSION INIT: CPT

## 2023-10-28 PROCEDURE — 80053 COMPREHEN METABOLIC PANEL: CPT

## 2023-10-28 RX ORDER — SODIUM CHLORIDE 9 MG/ML
INJECTION, SOLUTION INTRAVENOUS ONCE
Status: COMPLETED | OUTPATIENT
Start: 2023-10-28 | End: 2023-10-28

## 2023-10-28 NOTE — ED QUICK NOTES
PT ambulated to bathroom with one assist. Urine sample was soiled with stool. PT assisted back to bed and Purewick was placed after marj care was given.

## 2023-10-28 NOTE — ED INITIAL ASSESSMENT (HPI)
Pt c/o dizziness and increased lethargy for past few days, Pt states she took Meclizine this morning w/o relief

## 2023-10-28 NOTE — ED INITIAL ASSESSMENT (HPI)
Patient c/o feeling lightheaded since around 10am. Has not been feeling good since Thursday. Tried taking meclozine with no improvement.

## 2023-10-28 NOTE — DISCHARGE INSTRUCTIONS
Follow-up with your primary care doctor within the next week for reassessment. Return for severe dizziness, pain, fevers, or any other concerning symptoms.

## 2024-02-29 ENCOUNTER — LAB REQUISITION (OUTPATIENT)
Dept: LAB | Facility: HOSPITAL | Age: 89
End: 2024-02-29
Payer: MEDICARE

## 2024-02-29 DIAGNOSIS — I70.0 ATHEROSCLEROSIS OF AORTA (HCC): ICD-10-CM

## 2024-02-29 DIAGNOSIS — D75.1 SECONDARY POLYCYTHEMIA: ICD-10-CM

## 2024-02-29 DIAGNOSIS — D58.2 OTHER HEMOGLOBINOPATHIES (HCC): ICD-10-CM

## 2024-02-29 DIAGNOSIS — E46 UNSPECIFIED PROTEIN-CALORIE MALNUTRITION (HCC): ICD-10-CM

## 2024-02-29 DIAGNOSIS — E03.9 HYPOTHYROIDISM, UNSPECIFIED: ICD-10-CM

## 2024-02-29 LAB
ALBUMIN SERPL-MCNC: 4.1 G/DL (ref 3.4–5)
ALBUMIN/GLOB SERPL: 1.4 {RATIO} (ref 1–2)
ALP LIVER SERPL-CCNC: 63 U/L
ALT SERPL-CCNC: 23 U/L
ANION GAP SERPL CALC-SCNC: 1 MMOL/L (ref 0–18)
AST SERPL-CCNC: 13 U/L (ref 15–37)
BILIRUB SERPL-MCNC: 0.6 MG/DL (ref 0.1–2)
BUN BLD-MCNC: 17 MG/DL (ref 9–23)
CALCIUM BLD-MCNC: 9.5 MG/DL (ref 8.5–10.1)
CHLORIDE SERPL-SCNC: 106 MMOL/L (ref 98–112)
CHOLEST SERPL-MCNC: 192 MG/DL (ref ?–200)
CO2 SERPL-SCNC: 29 MMOL/L (ref 21–32)
CREAT BLD-MCNC: 0.87 MG/DL
EGFRCR SERPLBLD CKD-EPI 2021: 64 ML/MIN/1.73M2 (ref 60–?)
ERYTHROCYTE [DISTWIDTH] IN BLOOD BY AUTOMATED COUNT: 13.6 %
FASTING PATIENT LIPID ANSWER: YES
FASTING STATUS PATIENT QL REPORTED: YES
GLOBULIN PLAS-MCNC: 2.9 G/DL (ref 2.8–4.4)
GLUCOSE BLD-MCNC: 98 MG/DL (ref 70–99)
HCT VFR BLD AUTO: 48.4 %
HDLC SERPL-MCNC: 73 MG/DL (ref 40–59)
HGB BLD-MCNC: 15.8 G/DL
LDLC SERPL CALC-MCNC: 104 MG/DL (ref ?–100)
MCH RBC QN AUTO: 28.5 PG (ref 26–34)
MCHC RBC AUTO-ENTMCNC: 32.6 G/DL (ref 31–37)
MCV RBC AUTO: 87.2 FL
NONHDLC SERPL-MCNC: 119 MG/DL (ref ?–130)
OSMOLALITY SERPL CALC.SUM OF ELEC: 284 MOSM/KG (ref 275–295)
PLATELET # BLD AUTO: 258 10(3)UL (ref 150–450)
POTASSIUM SERPL-SCNC: 4.8 MMOL/L (ref 3.5–5.1)
PROT SERPL-MCNC: 7 G/DL (ref 6.4–8.2)
RBC # BLD AUTO: 5.55 X10(6)UL
SODIUM SERPL-SCNC: 136 MMOL/L (ref 136–145)
TRIGL SERPL-MCNC: 81 MG/DL (ref 30–149)
TSI SER-ACNC: 1.92 MIU/ML (ref 0.36–3.74)
VLDLC SERPL CALC-MCNC: 14 MG/DL (ref 0–30)
WBC # BLD AUTO: 5.8 X10(3) UL (ref 4–11)

## 2024-02-29 PROCEDURE — 84443 ASSAY THYROID STIM HORMONE: CPT | Performed by: FAMILY MEDICINE

## 2024-02-29 PROCEDURE — 80061 LIPID PANEL: CPT | Performed by: FAMILY MEDICINE

## 2024-02-29 PROCEDURE — 85027 COMPLETE CBC AUTOMATED: CPT | Performed by: FAMILY MEDICINE

## 2024-02-29 PROCEDURE — 80053 COMPREHEN METABOLIC PANEL: CPT | Performed by: FAMILY MEDICINE

## 2024-07-21 ENCOUNTER — APPOINTMENT (OUTPATIENT)
Dept: CT IMAGING | Facility: HOSPITAL | Age: 89
End: 2024-07-21
Attending: STUDENT IN AN ORGANIZED HEALTH CARE EDUCATION/TRAINING PROGRAM
Payer: MEDICARE

## 2024-07-21 ENCOUNTER — HOSPITAL ENCOUNTER (INPATIENT)
Facility: HOSPITAL | Age: 89
LOS: 16 days | Discharge: HOME HEALTH | End: 2024-08-06
Attending: STUDENT IN AN ORGANIZED HEALTH CARE EDUCATION/TRAINING PROGRAM | Admitting: STUDENT IN AN ORGANIZED HEALTH CARE EDUCATION/TRAINING PROGRAM
Payer: MEDICARE

## 2024-07-21 DIAGNOSIS — R11.2 NAUSEA AND VOMITING, UNSPECIFIED VOMITING TYPE: ICD-10-CM

## 2024-07-21 DIAGNOSIS — K56.609 SBO (SMALL BOWEL OBSTRUCTION) (HCC): ICD-10-CM

## 2024-07-21 DIAGNOSIS — N17.9 AKI (ACUTE KIDNEY INJURY) (HCC): Primary | ICD-10-CM

## 2024-07-21 LAB
ALBUMIN SERPL-MCNC: 5.2 G/DL (ref 3.2–4.8)
ALBUMIN/GLOB SERPL: 1.7 {RATIO} (ref 1–2)
ALP LIVER SERPL-CCNC: 74 U/L
ALT SERPL-CCNC: 17 U/L
ANION GAP SERPL CALC-SCNC: 9 MMOL/L (ref 0–18)
AST SERPL-CCNC: 16 U/L (ref ?–34)
BASOPHILS # BLD AUTO: 0.1 X10(3) UL (ref 0–0.2)
BASOPHILS NFR BLD AUTO: 0.8 %
BILIRUB SERPL-MCNC: 1.1 MG/DL (ref 0.2–1.1)
BILIRUB UR QL STRIP.AUTO: NEGATIVE
BUN BLD-MCNC: 32 MG/DL (ref 9–23)
CALCIUM BLD-MCNC: 10.9 MG/DL (ref 8.7–10.4)
CHLORIDE SERPL-SCNC: 99 MMOL/L (ref 98–112)
CO2 SERPL-SCNC: 28 MMOL/L (ref 21–32)
COLOR UR AUTO: YELLOW
CREAT BLD-MCNC: 1.71 MG/DL
EGFRCR SERPLBLD CKD-EPI 2021: 28 ML/MIN/1.73M2 (ref 60–?)
EOSINOPHIL # BLD AUTO: 0 X10(3) UL (ref 0–0.7)
EOSINOPHIL NFR BLD AUTO: 0 %
ERYTHROCYTE [DISTWIDTH] IN BLOOD BY AUTOMATED COUNT: 15.2 %
GLOBULIN PLAS-MCNC: 3.1 G/DL (ref 2.8–4.4)
GLUCOSE BLD-MCNC: 161 MG/DL (ref 70–99)
GLUCOSE UR STRIP.AUTO-MCNC: NORMAL MG/DL
HCT VFR BLD AUTO: 53 %
HGB BLD-MCNC: 17.9 G/DL
HYALINE CASTS #/AREA URNS AUTO: PRESENT /LPF
IMM GRANULOCYTES # BLD AUTO: 0.05 X10(3) UL (ref 0–1)
IMM GRANULOCYTES NFR BLD: 0.4 %
LACTATE SERPL-SCNC: 1.5 MMOL/L (ref 0.5–2)
LEUKOCYTE ESTERASE UR QL STRIP.AUTO: 75
LIPASE SERPL-CCNC: 35 U/L (ref 12–53)
LYMPHOCYTES # BLD AUTO: 0.45 X10(3) UL (ref 1–4)
LYMPHOCYTES NFR BLD AUTO: 3.4 %
MCH RBC QN AUTO: 28.9 PG (ref 26–34)
MCHC RBC AUTO-ENTMCNC: 33.8 G/DL (ref 31–37)
MCV RBC AUTO: 85.6 FL
MONOCYTES # BLD AUTO: 1.07 X10(3) UL (ref 0.1–1)
MONOCYTES NFR BLD AUTO: 8.2 %
NEUTROPHILS # BLD AUTO: 11.39 X10 (3) UL (ref 1.5–7.7)
NEUTROPHILS # BLD AUTO: 11.39 X10(3) UL (ref 1.5–7.7)
NEUTROPHILS NFR BLD AUTO: 87.2 %
NITRITE UR QL STRIP.AUTO: NEGATIVE
OSMOLALITY SERPL CALC.SUM OF ELEC: 292 MOSM/KG (ref 275–295)
PH UR STRIP.AUTO: 5.5 [PH] (ref 5–8)
PLATELET # BLD AUTO: 296 10(3)UL (ref 150–450)
POTASSIUM SERPL-SCNC: 4.9 MMOL/L (ref 3.5–5.1)
PROT SERPL-MCNC: 8.3 G/DL (ref 5.7–8.2)
PROT UR STRIP.AUTO-MCNC: 50 MG/DL
RBC # BLD AUTO: 6.19 X10(6)UL
RBC UR QL AUTO: NEGATIVE
SARS-COV-2 RNA RESP QL NAA+PROBE: NOT DETECTED
SODIUM SERPL-SCNC: 136 MMOL/L (ref 136–145)
SP GR UR STRIP.AUTO: 1.03 (ref 1–1.03)
UROBILINOGEN UR STRIP.AUTO-MCNC: 2 MG/DL
WBC # BLD AUTO: 13.1 X10(3) UL (ref 4–11)

## 2024-07-21 PROCEDURE — 74176 CT ABD & PELVIS W/O CONTRAST: CPT | Performed by: STUDENT IN AN ORGANIZED HEALTH CARE EDUCATION/TRAINING PROGRAM

## 2024-07-21 PROCEDURE — 85025 COMPLETE CBC W/AUTO DIFF WBC: CPT | Performed by: STUDENT IN AN ORGANIZED HEALTH CARE EDUCATION/TRAINING PROGRAM

## 2024-07-21 PROCEDURE — 83605 ASSAY OF LACTIC ACID: CPT | Performed by: STUDENT IN AN ORGANIZED HEALTH CARE EDUCATION/TRAINING PROGRAM

## 2024-07-21 PROCEDURE — 87086 URINE CULTURE/COLONY COUNT: CPT | Performed by: STUDENT IN AN ORGANIZED HEALTH CARE EDUCATION/TRAINING PROGRAM

## 2024-07-21 PROCEDURE — 96361 HYDRATE IV INFUSION ADD-ON: CPT

## 2024-07-21 PROCEDURE — 83690 ASSAY OF LIPASE: CPT | Performed by: STUDENT IN AN ORGANIZED HEALTH CARE EDUCATION/TRAINING PROGRAM

## 2024-07-21 PROCEDURE — 80053 COMPREHEN METABOLIC PANEL: CPT | Performed by: STUDENT IN AN ORGANIZED HEALTH CARE EDUCATION/TRAINING PROGRAM

## 2024-07-21 PROCEDURE — 96376 TX/PRO/DX INJ SAME DRUG ADON: CPT

## 2024-07-21 PROCEDURE — 96375 TX/PRO/DX INJ NEW DRUG ADDON: CPT

## 2024-07-21 PROCEDURE — 81001 URINALYSIS AUTO W/SCOPE: CPT | Performed by: STUDENT IN AN ORGANIZED HEALTH CARE EDUCATION/TRAINING PROGRAM

## 2024-07-21 PROCEDURE — 99285 EMERGENCY DEPT VISIT HI MDM: CPT

## 2024-07-21 PROCEDURE — 96374 THER/PROPH/DIAG INJ IV PUSH: CPT

## 2024-07-21 RX ORDER — LORAZEPAM 2 MG/ML
0.5 INJECTION INTRAMUSCULAR ONCE
Status: COMPLETED | OUTPATIENT
Start: 2024-07-21 | End: 2024-07-21

## 2024-07-21 RX ORDER — LIDOCAINE HYDROCHLORIDE 20 MG/ML
10 SOLUTION OROPHARYNGEAL ONCE
Status: COMPLETED | OUTPATIENT
Start: 2024-07-21 | End: 2024-07-21

## 2024-07-21 RX ORDER — ONDANSETRON 2 MG/ML
4 INJECTION INTRAMUSCULAR; INTRAVENOUS ONCE
Status: COMPLETED | OUTPATIENT
Start: 2024-07-21 | End: 2024-07-21

## 2024-07-21 RX ORDER — MORPHINE SULFATE 4 MG/ML
4 INJECTION, SOLUTION INTRAMUSCULAR; INTRAVENOUS EVERY 2 HOUR PRN
Status: DISCONTINUED | OUTPATIENT
Start: 2024-07-21 | End: 2024-08-06

## 2024-07-21 RX ORDER — MORPHINE SULFATE 2 MG/ML
2 INJECTION, SOLUTION INTRAMUSCULAR; INTRAVENOUS EVERY 2 HOUR PRN
Status: DISCONTINUED | OUTPATIENT
Start: 2024-07-21 | End: 2024-08-06

## 2024-07-21 RX ORDER — METOCLOPRAMIDE HYDROCHLORIDE 5 MG/ML
5 INJECTION INTRAMUSCULAR; INTRAVENOUS EVERY 8 HOURS PRN
Status: DISCONTINUED | OUTPATIENT
Start: 2024-07-21 | End: 2024-08-01

## 2024-07-21 RX ORDER — SODIUM CHLORIDE, SODIUM LACTATE, POTASSIUM CHLORIDE, CALCIUM CHLORIDE 600; 310; 30; 20 MG/100ML; MG/100ML; MG/100ML; MG/100ML
INJECTION, SOLUTION INTRAVENOUS CONTINUOUS
Status: DISCONTINUED | OUTPATIENT
Start: 2024-07-21 | End: 2024-07-25

## 2024-07-21 RX ORDER — HEPARIN SODIUM 5000 [USP'U]/ML
5000 INJECTION, SOLUTION INTRAVENOUS; SUBCUTANEOUS EVERY 8 HOURS SCHEDULED
Status: DISCONTINUED | OUTPATIENT
Start: 2024-07-22 | End: 2024-07-22

## 2024-07-21 RX ORDER — ENOXAPARIN SODIUM 100 MG/ML
40 INJECTION SUBCUTANEOUS DAILY
Status: DISCONTINUED | OUTPATIENT
Start: 2024-07-22 | End: 2024-07-21 | Stop reason: DRUGHIGH

## 2024-07-21 RX ORDER — MORPHINE SULFATE 2 MG/ML
1 INJECTION, SOLUTION INTRAMUSCULAR; INTRAVENOUS EVERY 2 HOUR PRN
Status: DISCONTINUED | OUTPATIENT
Start: 2024-07-21 | End: 2024-08-06

## 2024-07-21 RX ORDER — ONDANSETRON 2 MG/ML
4 INJECTION INTRAMUSCULAR; INTRAVENOUS EVERY 6 HOURS PRN
Status: DISCONTINUED | OUTPATIENT
Start: 2024-07-21 | End: 2024-08-01

## 2024-07-21 NOTE — ED QUICK NOTES
Attempted to insert NGT per MD order. Pt did not tolerate, unable to sit still & follow commands per RN. MD notified.

## 2024-07-21 NOTE — ED PROVIDER NOTES
Patient Seen in: [facility] Emergency Department      History     Chief Complaint   Patient presents with    Abdominal Pain    Vomiting     Stated Complaint: abdominal pain, vomiting    Subjective:   HPI    The patient is a 89-year-old female who resides in an independent living facility possible questionable history of dementia per daughter who is here with her who presents with abdominal pain and vomiting.  Daughter states last night patient called her to say that she had vomiting.  Today patient had lunch and then had vomiting again.  Patient's daughter tells me she has a history of bowel obstruction in the past.  The patient is unable to tell me if she has had a bowel movement today or if she has passed gas.  When asked about urinary symptoms the patient states I do not know.    Objective:   Past Medical History:    Calculus of kidney    Calculus of right kidney ~ 2mm Dx CT Scan [5/11] - KATHERINE Sandhu MD    Diverticulosis of colon (without mention of hemorrhage)    JOSÉ MIGUEL (generalized anxiety disorder)    H/O [5/11] hematuria / ZARAGOZA CT Scan Abdomen 2mm right sided kidney stone - KAHTERINE Sandhu MD -    High cholesterol    HYPERLIPIDEMIA    KIDNEY STONE    Nausea and vomiting, unspecified vomiting type    Osteoporosis Screening [2/18] / Postmenopausal status --> NORMAL    [02/18] T Score femoral neck -0.6    Plantar fascial fibromatosis*    Renal cyst Dx (5/11) -    S/P cataract extraction of both eyes with insertion of intraocular lens - [facility] [[facility]]    S/P Colonoscopy & LGI (1/07) not to be rechecked as risk > benefit - ELMER Chi [2/17]    S/P Subtotal Thyroidectomy - Dr. Corbett    S/P [7/15] cholecystectomy - EMILY Rouse*    Unspecified disorder of thyroid    Vertigo              Past Surgical History:   Procedure Laterality Date    Cholecystectomy      Hemorrhoidectomy      Laparoscopic cholecystectomy N/A 7/9/2015    Procedure: LAPAROSCOPIC CHOLECYSTECTOMY;  Surgeon: Toney Rouse MD;  Location:  MAIN OR     Other      thyroid removed    Other surgical history  11    Dr. Edson leonard    Other surgical history  08/10/2021    ANAL CANAL POLYP - DJP     Removal gallbladder      Removal of tonsils,12+ y/o                  Social History     Socioeconomic History    Marital status:    Tobacco Use    Smoking status: Former     Current packs/day: 0.00     Types: Cigarettes     Quit date: 1955     Years since quittin.0    Smokeless tobacco: Never   Vaping Use    Vaping status: Never Used   Substance and Sexual Activity    Alcohol use: No    Drug use: No     Social Determinants of Health     Food Insecurity: No Food Insecurity (2024)    Food Insecurity     Food Insecurity: Never true   Transportation Needs: No Transportation Needs (2024)    Transportation Needs     Lack of Transportation: No   Housing Stability: Low Risk  (2024)    Housing Stability     Housing Instability: No              Review of Systems    Positive for stated Chief Complaint: Abdominal Pain and Vomiting    Other systems are as noted in HPI.  Constitutional and vital signs reviewed.      All other systems reviewed and negative except as noted above.    Physical Exam     ED Triage Vitals [24 1400]   /76   Pulse 104   Resp 18   Temp 97.4 °F (36.3 °C)   Temp src Temporal   SpO2 98 %   O2 Device None (Room air)       Current Vitals:   Vital Signs  BP: 111/54  Pulse: 67  Resp: 15  Temp: 99.7 °F (37.6 °C)  Temp src: Oral  MAP (mmHg): 68    Oxygen Therapy  SpO2: 98 %  O2 Device: None (Room air)  O2 Flow Rate (L/min): 2 L/min  Pulse Oximetry Type: Continuous  Oximetry Probe Site Changed: No  Pulse Ox Probe Location: Left hand            Physical Exam  Vitals and nursing note reviewed.   Constitutional:       General: She is not in acute distress.     Appearance: Normal appearance.   HENT:      Head: Normocephalic.      Nose: Nose normal.      Mouth/Throat:      Mouth: Mucous membranes are moist.   Eyes:      Extraocular  Movements: Extraocular movements intact.      Pupils: Pupils are equal, round, and reactive to light.   Cardiovascular:      Rate and Rhythm: Normal rate and regular rhythm.      Pulses: Normal pulses.   Pulmonary:      Effort: Pulmonary effort is normal.   Abdominal:      General: Abdomen is flat. Bowel sounds are normal. There is distension.      Palpations: Abdomen is soft.      Tenderness: There is generalized abdominal tenderness. There is no right CVA tenderness, left CVA tenderness, guarding or rebound.      Hernia: No hernia is present.   Musculoskeletal:         General: No swelling or tenderness. Normal range of motion.      Cervical back: Normal range of motion.   Skin:     General: Skin is warm and dry.   Neurological:      Mental Status: She is alert and oriented to person, place, and time. Mental status is at baseline.   Psychiatric:         Mood and Affect: Mood normal.               ED Course     Labs Reviewed   COMP METABOLIC PANEL (14) - Abnormal; Notable for the following components:       Result Value    Glucose 161 (*)     BUN 32 (*)     Creatinine 1.71 (*)     Calcium, Total 10.9 (*)     eGFR-Cr 28 (*)     Total Protein 8.3 (*)     Albumin 5.2 (*)     All other components within normal limits   URINALYSIS WITH CULTURE REFLEX - Abnormal; Notable for the following components:    Clarity Urine Turbid (*)     Ketones Urine Trace (*)     Protein Urine 50 (*)     Urobilinogen Urine 2 (*)     Leukocyte Esterase Urine 75 (*)     WBC Urine 6-10 (*)     RBC Urine 3-5 (*)     Squamous Epi. Cells Few (*)     Hyaline Casts Present (*)     All other components within normal limits   BASIC METABOLIC PANEL (8) - Abnormal; Notable for the following components:    Glucose 121 (*)     BUN 37 (*)     Creatinine 1.15 (*)     eGFR-Cr 46 (*)     All other components within normal limits   CBC W/ DIFFERENTIAL - Abnormal; Notable for the following components:    WBC 13.1 (*)     RBC 6.19 (*)     HGB 17.9 (*)     HCT  53.0 (*)     Neutrophil Absolute Prelim 11.39 (*)     Neutrophil Absolute 11.39 (*)     Lymphocyte Absolute 0.45 (*)     Monocyte Absolute 1.07 (*)     All other components within normal limits   CBC W/ DIFFERENTIAL - Abnormal; Notable for the following components:    RBC 5.51 (*)     Neutrophil Absolute Prelim 7.72 (*)     Neutrophil Absolute 7.72 (*)     Lymphocyte Absolute 0.49 (*)     Monocyte Absolute 1.13 (*)     All other components within normal limits   LIPASE - Normal   LACTIC ACID, PLASMA - Normal   RAPID SARS-COV-2 BY PCR - Normal   CBC WITH DIFFERENTIAL WITH PLATELET    Narrative:     The following orders were created for panel order CBC With Differential With Platelet.  Procedure                               Abnormality         Status                     ---------                               -----------         ------                     CBC W/ DIFFERENTIAL[[id number]]          Abnormal            Final result                 Please view results for these tests on the individual orders.   CBC WITH DIFFERENTIAL WITH PLATELET    Narrative:     The following orders were created for panel order CBC With Differential With Platelet.  Procedure                               Abnormality         Status                     ---------                               -----------         ------                     CBC W/ DIFFERENTIAL[[id number]]          Abnormal            Final result                 Please view results for these tests on the individual orders.   SCAN SLIDE   CBC, PLATELET; NO DIFFERENTIAL   URINE CULTURE, ROUTINE       MDM    Medical Decision Making    The differential includes the following  Small bowel obstruction, sepsis, renal colic, urinary tract infection    Pertinent comorbidities include  As listed above    Pertinent social history includes  As listed above    Labs  BUN of 32, creatinine 1.71 and a GFR of 28  White blood cell count 13.1 hemoglobin 17.9        External data reviewed  Labs  from February 2019 of this year    Discussion of management with external providers  [facility]  hospitalist.     ER course  Here the patient is afebrile and hemodynamically stable.  Her abdomen is distended and she is tender.  Patient does have a leukocytosis but also signs of dehydration and she has elevated BUN and creatinine.  Patient's urinalysis shows some pyuria so red blood cells.  Pt will need to admitted  for IV hydration. CT scan is pending.   Disposition and Plan     Clinical Impression:  1. WAYNE (acute kidney injury) ([facility])    2. Nausea and vomiting, unspecified vomiting type    3. SBO (small bowel obstruction) ([facility])         Disposition:  Admit  7/21/2024  7:37 pm    Follow-up:  No follow-up provider specified.        Medications Prescribed:  Current Discharge Medication List                            Hospital Problems       Present on Admission  Date Reviewed: 10/28/2023            ICD-10-CM Noted POA    * (Principal) WAYNE (acute kidney injury) ([facility]) N17.9 7/21/2024 Unknown    Nausea and vomiting, unspecified vomiting type R11.2 7/21/2024 Unknown    SBO (small bowel obstruction) ([facility]) K56.609 10/9/2022 Unknown

## 2024-07-21 NOTE — ED PROVIDER NOTES
CT ABDOMEN+PELVIS(CPT=74176)   Final Result   PROCEDURE:  CT ABDOMEN+PELVIS (CPT=74176)       COMPARISON:  [facility] , CT, CT ABDOMEN PELVIS IV CONTRAST, NO ORAL (ER),    10/09/2022, 6:03 AM.       INDICATIONS:  Abdominal pain, possible small bowel obstruction       TECHNIQUE:  Unenhanced multislice CT scanning was performed from the dome    of the diaphragm to the pubic symphysis.  Dose reduction techniques were    used. Dose information is transmitted to the ACR (American College of    Radiology) NRDR (National Radiology    Data Registry) which includes the Dose Index Registry.       PATIENT STATED HISTORY: (As transcribed by Technologist)  Fatigue and    intermittent abd pain.             FINDINGS:     LIVER:  No enlargement, atrophy, abnormal density, or significant focal    lesion.     BILIARY:  Cholecystectomy with expected prominence of the common bile    duct.   PANCREAS:  No lesion, fluid collection, ductal dilatation, or atrophy.     SPLEEN:  No enlargement or focal lesion.     KIDNEYS:  No mass, obstruction, or calcification.     ADRENALS:  No mass or enlargement.     AORTA/VASCULAR:    Extensive calcified atherosclerosis without aneurysm.   RETROPERITONEUM:  No mass or adenopathy.     BOWEL/MESENTERY:  Marked gastric and small bowel dilation with transition    point of the right mid abdomen (series 2, image 56, series 4, image 22)    consistent with high-grade distal small-bowel obstruction.  There may be a    2nd point of transition in close    proximity (series 4, image 20) raising the possibility of closed loop    small bowel obstruction.  The distal small bowel and colon are    decompressed.  Fluid and gas noted within the dilated small bowel without    definite pneumatosis.  No portal/mesenteric    venous gas.  Extensive distal colonic diverticulosis.   ABDOMINAL WALL:  No mass or hernia.     URINARY BLADDER:  No visible focal wall thickening, lesion, or calculus.     PELVIC NODES:  No adenopathy.      PELVIC ORGANS:  No visible mass.  Pelvic organs appropriate for patient    age.     BONES:  No bony lesion or fracture.  Osteoarthritis of the hips and spine.   LUNG BASES:  No visible pulmonary or pleural disease.     OTHER:  Negative.                           =====   CONCLUSION:         Findings consistent with high-grade distal small-bowel obstruction with    possible closed loop small bowel obstruction.  Surgical consultation    advised.       Findings discussed with Dr. Rodriguez at 5:15 p.m. Central standard time on    07/21/2024.           LOCATION:  [facility]           Dictated by (CST): Nithin Beauchamp MD on 7/21/2024 at 5:11 PM        Finalized by (CST): Nithin Beauchamp MD on 7/21/2024 at 5:19 PM         Patient signed out to me by Dr. Bhatt  Findings noted above.  I discussed the case with Dr. Rouse general surgery.  NG tube will be placed.  I updated the hospitalist regarding the above findings.  Patient without any peritoneal signs.  Hemodynamically stable.  Patient will be admitted to the floor for further care.        Addendum: Lactic acid is normal.  Unable to pass an NG tube after multiple attempts.  I did discuss with general surgery for further management.  Dr. Rouse recommended patient to be transferred to the floor for NG placement.

## 2024-07-22 ENCOUNTER — APPOINTMENT (OUTPATIENT)
Dept: GENERAL RADIOLOGY | Facility: HOSPITAL | Age: 89
End: 2024-07-22
Attending: STUDENT IN AN ORGANIZED HEALTH CARE EDUCATION/TRAINING PROGRAM
Payer: MEDICARE

## 2024-07-22 LAB
ANION GAP SERPL CALC-SCNC: 8 MMOL/L (ref 0–18)
BASOPHILS # BLD AUTO: 0.03 X10(3) UL (ref 0–0.2)
BASOPHILS NFR BLD AUTO: 0.3 %
BUN BLD-MCNC: 37 MG/DL (ref 9–23)
CALCIUM BLD-MCNC: 10.1 MG/DL (ref 8.7–10.4)
CHLORIDE SERPL-SCNC: 100 MMOL/L (ref 98–112)
CO2 SERPL-SCNC: 29 MMOL/L (ref 21–32)
CREAT BLD-MCNC: 1.15 MG/DL
EGFRCR SERPLBLD CKD-EPI 2021: 46 ML/MIN/1.73M2 (ref 60–?)
EOSINOPHIL # BLD AUTO: 0.02 X10(3) UL (ref 0–0.7)
EOSINOPHIL NFR BLD AUTO: 0.2 %
ERYTHROCYTE [DISTWIDTH] IN BLOOD BY AUTOMATED COUNT: 14.3 %
GLUCOSE BLD-MCNC: 121 MG/DL (ref 70–99)
HCT VFR BLD AUTO: 47 %
HGB BLD-MCNC: 15.6 G/DL
IMM GRANULOCYTES # BLD AUTO: 0.02 X10(3) UL (ref 0–1)
IMM GRANULOCYTES NFR BLD: 0.2 %
LYMPHOCYTES # BLD AUTO: 0.49 X10(3) UL (ref 1–4)
LYMPHOCYTES NFR BLD AUTO: 5.2 %
MCH RBC QN AUTO: 28.3 PG (ref 26–34)
MCHC RBC AUTO-ENTMCNC: 33.2 G/DL (ref 31–37)
MCV RBC AUTO: 85.3 FL
MONOCYTES # BLD AUTO: 1.13 X10(3) UL (ref 0.1–1)
MONOCYTES NFR BLD AUTO: 12 %
NEUTROPHILS # BLD AUTO: 7.72 X10 (3) UL (ref 1.5–7.7)
NEUTROPHILS # BLD AUTO: 7.72 X10(3) UL (ref 1.5–7.7)
NEUTROPHILS NFR BLD AUTO: 82.1 %
OSMOLALITY SERPL CALC.SUM OF ELEC: 294 MOSM/KG (ref 275–295)
PLATELET # BLD AUTO: 238 10(3)UL (ref 150–450)
POTASSIUM SERPL-SCNC: 4.1 MMOL/L (ref 3.5–5.1)
RBC # BLD AUTO: 5.51 X10(6)UL
SODIUM SERPL-SCNC: 137 MMOL/L (ref 136–145)
WBC # BLD AUTO: 9.4 X10(3) UL (ref 4–11)

## 2024-07-22 PROCEDURE — 85025 COMPLETE CBC W/AUTO DIFF WBC: CPT | Performed by: HOSPITALIST

## 2024-07-22 PROCEDURE — 80048 BASIC METABOLIC PNL TOTAL CA: CPT | Performed by: HOSPITALIST

## 2024-07-22 PROCEDURE — 71045 X-RAY EXAM CHEST 1 VIEW: CPT | Performed by: STUDENT IN AN ORGANIZED HEALTH CARE EDUCATION/TRAINING PROGRAM

## 2024-07-22 RX ORDER — HEPARIN SODIUM 5000 [USP'U]/ML
5000 INJECTION, SOLUTION INTRAVENOUS; SUBCUTANEOUS EVERY 12 HOURS SCHEDULED
Status: DISCONTINUED | OUTPATIENT
Start: 2024-07-22 | End: 2024-07-22

## 2024-07-22 RX ORDER — HEPARIN SODIUM 5000 [USP'U]/ML
5000 INJECTION, SOLUTION INTRAVENOUS; SUBCUTANEOUS EVERY 12 HOURS SCHEDULED
Status: DISCONTINUED | OUTPATIENT
Start: 2024-07-22 | End: 2024-08-06

## 2024-07-22 RX ORDER — ACETAMINOPHEN 10 MG/ML
15 INJECTION, SOLUTION INTRAVENOUS EVERY 8 HOURS PRN
Status: DISCONTINUED | OUTPATIENT
Start: 2024-07-22 | End: 2024-08-06

## 2024-07-22 NOTE — ED QUICK NOTES
Orders for admission, patient is aware of plan and ready to go upstairs. Any questions, please call ED BETTYE Carranza at extension [phone].     Patient Covid vaccination status: Fully vaccinated     COVID Test Ordered in ED: Rapid SARS-CoV-2 by PCR    COVID Suspicion at Admission: N/A    Running Infusions:  None    Mental Status/LOC at time of transport: A&O x3    Other pertinent information:   CIWA score: N/A   NIH score:  N/A

## 2024-07-22 NOTE — CONSULTS
[facility]  Report of Consultation    Catherine Bustamante Patient Status:  Inpatient    1935 MRN [id number]   Location [facility] 3NW-A Attending Hans Marino MD   Hosp Day # 1 PCP Flynn Polk MD     24    Reason for Consultation:    Surgical Consultation     CC:   Chief Complaint   Patient presents with    Abdominal Pain    Vomiting        History of Present Illness:    Catherine Bustamante is a a(n) 89 year old female.Patient is lethargic, no family at bedside.  Patient c/o abdominal that began 1-2 days ago.She reports her pain was initially intermittent, she thought she may have been constipated.  She reports her pain was across her mid abdomen, notes n/v associated pain.   CT scan obtained revealing SBO, transition point noted in right abdomen, question of additional transition point   NG tube was placed, approx 800cc in cannister at time of consult.   Currently reports abdominal pain 2/10, she c/o throat pain   Patient cannot recall previous abdominal surgery  Reports that she takes asa 81 mg daily      Past Medical History:    Past Medical History:    Calculus of kidney    Calculus of right kidney ~ 2mm Dx CT Scan [] - KATHERINE Sandhu MD    Diverticulosis of colon (without mention of hemorrhage)    JOSÉ MIGUEL (generalized anxiety disorder)    H/O [] hematuria / [facility] CT Scan Abdomen 2mm right sided kidney stone - KATHERINE Sandhu MD -    High cholesterol    HYPERLIPIDEMIA    KIDNEY STONE    Nausea and vomiting, unspecified vomiting type    Osteoporosis Screening [] / Postmenopausal status --> NORMAL    [] T Score femoral neck -0.6    Plantar fascial fibromatosis*    Renal cyst Dx () -    S/P cataract extraction of both eyes with insertion of intraocular lens - [facility] [M. Madera]    S/P Colonoscopy & LGI () not to be rechecked as risk > benefit - ELMER Chi []    S/P Subtotal Thyroidectomy - Dr. Corbett    S/P [7/15] cholecystectomy - EMILY Rouse*    Unspecified disorder of thyroid    Vertigo        Past Surgical History:    Past Surgical History:   Procedure Laterality Date    Cholecystectomy      Hemorrhoidectomy      Laparoscopic cholecystectomy N/A 7/9/2015    Procedure: LAPAROSCOPIC CHOLECYSTECTOMY;  Surgeon: Toney Rouse MD;  Location: [facility] MAIN OR    Other      thyroid removed    Other surgical history  6/16/11    Dr. Edson leonard    Other surgical history  08/10/2021    ANAL CANAL POLYP - DJP     Removal gallbladder      Removal of tonsils,12+ y/o         Family History:    Family History   Problem Relation Age of Onset    Breast Cancer Paternal Grandmother 60        dx 60       Social History:     reports that she quit smoking about 69 years ago. She has never used smokeless tobacco. She reports that she does not drink alcohol and does not use drugs.    Allergies:    Allergies   Allergen Reactions    Latex RASH    Latex RASH       Current Medications:      Current Facility-Administered Medications:     lactated ringers infusion, , Intravenous, Continuous    morphINE PF 2 MG/ML injection 1 mg, 1 mg, Intravenous, Q2H PRN **OR** morphINE PF 2 MG/ML injection 2 mg, 2 mg, Intravenous, Q2H PRN **OR** morphINE PF 4 MG/ML injection 4 mg, 4 mg, Intravenous, Q2H PRN    ondansetron (Zofran) 4 MG/2ML injection 4 mg, 4 mg, Intravenous, Q6H PRN    metoclopramide (Reglan) 5 mg/mL injection 5 mg, 5 mg, Intravenous, Q8H PRN    heparin (Porcine) 5000 UNIT/ML injection 5,000 Units, 5,000 Units, Subcutaneous, Q8H JANETH    Home Medications:    No current facility-administered medications on file prior to encounter.     Current Outpatient Medications on File Prior to Encounter   Medication Sig Dispense Refill    atorvastatin 20 MG Oral Tab Take 1 tablet (20 mg total) by mouth nightly. 90 tablet 3    levothyroxine 50 MCG Oral Tab Take 1 tablet (50 mcg total) by mouth daily. ON AN EMPTY STOMACH 90 tablet 3    ASPIRIN 81 OR Take by mouth as needed (Taking as needed for sleep).      Calcium Carbonate 1500 (600 Ca) MG Oral Tab  TAKE 1 TABLET DAILY.      Calcium-Vitamin D (CALTRATE 600 PLUS-VIT D OR) Take  by mouth.      Cholecalciferol (VITAMIN D3) 1000 UNITS Oral Cap Take  by mouth daily.      CENTRUM OR 1TABLET DAILY      sertraline 50 MG Oral Tab Take 2.5 tablets (125 mg total) by mouth daily. 225 tablet 1    Meclizine HCl 25 MG Oral Tab Take 1 tablet (25 mg total) by mouth 3 (three) times daily as needed for Dizziness. 30 tablet 1       Review of Systems:    10 point review performed pertinent positives and negatives per history of present illness.    Physical Exam:    Blood pressure 101/52, pulse 87, temperature 98.4 °F (36.9 °C), temperature source Oral, resp. rate 16, height 5' 3\" (1.6 m), weight 100 lb 14.4 oz (45.8 kg), SpO2 93%, not currently breastfeeding.    GENERAL: lethargic, well developed, well nourished female, in no apparent distress    SKIN: anicteric    RESPIRATORY: non labored breathing    CARDIOVASCULAR: RRR    ABDOMEN: soft non tender upper quadrants,  distended with fullness across lower abdomen, moderate tenderness in lower abdomen    LYMPHATIC: no lymphadenopathy    EXTREMITIES: no cyanosis, clubbing or edema    .    Laboratory Data:  Recent Labs   Lab 07/21/24  1510 07/22/24  0520   WBC 13.1* 9.4   HGB 17.9* 15.6   MCV 85.6 85.3   .0 238.0       Recent Labs   Lab 07/21/24  1510 07/22/24  0520    137   K 4.9 4.1   CL 99 100   CO2 28.0 29.0   BUN 32* 37*   CREATSERUM 1.71* 1.15*   * 121*   CA 10.9* 10.1       Recent Labs   Lab 07/21/24  1510   ALT 17   AST 16   ALB 5.2*       No results for input(s): \"TROP\" in the last 168 hours.          Radiology:    CT ABDOMEN+PELVIS(CPT=74176)    Result Date: 7/21/2024  PROCEDURE:  CT ABDOMEN+PELVIS (CPT=74176)  COMPARISON:  [facility] , CT, CT ABDOMEN PELVIS IV CONTRAST, NO ORAL (ER), 10/09/2022, 6:03 AM.  INDICATIONS:  Abdominal pain, possible small bowel obstruction  TECHNIQUE:  Unenhanced multislice CT scanning was performed from the dome of the diaphragm to  the pubic symphysis.  Dose reduction techniques were used. Dose information is transmitted to the ACR (American College of Radiology) NRDR (National Radiology Data Registry) which includes the Dose Index Registry.  PATIENT STATED HISTORY: (As transcribed by Technologist)  Fatigue and intermittent abd pain.    FINDINGS:  LIVER:  No enlargement, atrophy, abnormal density, or significant focal lesion.  BILIARY:  Cholecystectomy with expected prominence of the common bile duct. PANCREAS:  No lesion, fluid collection, ductal dilatation, or atrophy.  SPLEEN:  No enlargement or focal lesion.  KIDNEYS:  No mass, obstruction, or calcification.  ADRENALS:  No mass or enlargement.  AORTA/VASCULAR:    Extensive calcified atherosclerosis without aneurysm. RETROPERITONEUM:  No mass or adenopathy.  BOWEL/MESENTERY:  Marked gastric and small bowel dilation with transition point of the right mid abdomen (series 2, image 56, series 4, image 22) consistent with high-grade distal small-bowel obstruction.  There may be a 2nd point of transition in close proximity (series 4, image 20) raising the possibility of closed loop small bowel obstruction.  The distal small bowel and colon are decompressed.  Fluid and gas noted within the dilated small bowel without definite pneumatosis.  No portal/mesenteric venous gas.  Extensive distal colonic diverticulosis. ABDOMINAL WALL:  No mass or hernia.  URINARY BLADDER:  No visible focal wall thickening, lesion, or calculus.  PELVIC NODES:  No adenopathy.  PELVIC ORGANS:  No visible mass.  Pelvic organs appropriate for patient age.  BONES:  No bony lesion or fracture.  Osteoarthritis of the hips and spine. LUNG BASES:  No visible pulmonary or pleural disease.  OTHER:  Negative.             CONCLUSION:   Findings consistent with high-grade distal small-bowel obstruction with possible closed loop small bowel obstruction.  Surgical consultation advised.  Findings discussed with Dr. Rodriguez at 5:15 p.m.  Central standard time on 07/21/2024.   LOCATION:  [facility]   Dictated by (CST): Nithin Beauchamp MD on 7/21/2024 at 5:11 PM     Finalized by (CST): Nithin Beauchamp MD on 7/21/2024 at 5:19 PM          Problem List:    Patient Active Problem List   Diagnosis    Hyperlipidemia, [mixed] / Rx: Atorvastain / LDL Goal < 160 mg/dl - S. [place]    Elevated Glucose / HbA1C < 6.0%    Polycythemia - PAOLA 2 mutation negative [HCC]    Vitamin D deficiency Rx vitamin d 3 [cholecalciferol]    Screening mammogram for breast cancer    Hypothyroidism    Benign paroxysmal positional vertigo due to bilateral vestibular disorder    Anxiety disorder    Asymptomatic carotid artery stenosis, bilateral    Chronic fatigue and malaise    LBBB (left bundle branch block)    Pure hypercholesterolemia    Vertigo    Schizoaffective disorder (HCC)    PVC's (premature ventricular contractions)    Anal polyp    Hyponatremia    Acute kidney injury (HCC)    Azotemia    Hyperglycemia    SBO (small bowel obstruction) (HCC)    Chest pain of uncertain etiology    WAYNE (acute kidney injury) (HCC)    Nausea and vomiting, unspecified vomiting type       Impression:    89 y/o with SBO  CT scan as noted above  Afebrile, no leukocytosis  Lethargic, no family at bedside, soft and non tender in upper quadrants, though fullness with moderate tenderness across lower abdomen   -abdominal pain improved to 2/10 after NG tube was placed    Plan:    Reviewed with patient recommendations for conservative management   -NPO ice chips  -NG to LIS  -serial abdominal exams  -IV fluids  -encourage ambulation  -obstructive series  -IV tylenol   -IV protonix   Explained to patient that if their condition deteriorates, they may require surgical management.   Will discuss with LISA Barber  [facility]  General Surgery  7/22/2024

## 2024-07-22 NOTE — PLAN OF CARE
Pt vitals stable, A&O x4. IVF infusing. Pt refusing pain at this time. To remain NPO. Bed locked in low position, call light in reach, rounding provided.

## 2024-07-22 NOTE — PAYOR COMM NOTE
--------------  ADMISSION REVIEW     Payor: [employer]  Subscriber #:  [id number]  Authorization Number: [id number]    Admit date: 7/21/24  Admit time:  9:50 PM      Patient Seen in: [facility] Emergency Department      Stated Complaint: abdominal pain, vomiting      The patient is a 89-year-old female who resides in an independent living facility possible questionable history of dementia per daughter who is here with her who presents with abdominal pain and vomiting.  Daughter states last night patient called her to say that she had vomiting.  Today patient had lunch and then had vomiting again.  Patient's daughter tells me she has a history of bowel obstruction in the past.  The patient is unable to tell me if she has had a bowel movement today or if she has passed gas.  When asked about urinary symptoms the patient states I do not know.         Past Medical History:    Calculus of kidney    Calculus of right kidney ~ 2mm Dx CT Scan [5/11] - KATHERINE Sandhu MD    Diverticulosis of colon (without mention of hemorrhage)    JOSÉ MIGUEL (generalized anxiety disorder)    H/O [5/11] hematuria / [facility] CT Scan Abdomen 2mm right sided kidney stone - KATHERINE Sandhu MD -    High cholesterol    HYPERLIPIDEMIA    KIDNEY STONE    Osteoporosis Screening [2/18] / Postmenopausal status --> NORMAL     [02/18] T Score femoral neck -0.6    Plantar fascial fibromatosis*    Renal cyst Dx (5/11) -    S/P cataract extraction of both eyes with insertion of intraocular lens - [facility] [M. Madera]    S/P Colonoscopy & LGI (1/07) not to be rechecked as risk > benefit - [facility] Chi [2/17]    S/P Subtotal Thyroidectomy - Dr. Corbett    S/P [7/15] cholecystectomy - EMILY Rouse*    Unspecified disorder of thyroid    Vertigo             Past Surgical History:   Procedure Laterality Date    Cholecystectomy        Hemorrhoidectomy        Laparoscopic cholecystectomy N/A 7/9/2015     Procedure: LAPAROSCOPIC CHOLECYSTECTOMY;  Surgeon: Toney Rouse MD;   Location: [facility] MAIN OR    Other         thyroid removed    Other surgical history   6/16/11     Dr. Edson leonard    Other surgical history   08/10/2021     ANAL CANAL POLYP - [facility]     Removal gallbladder              ED Triage Vitals [07/21/24 1400]   /76   Pulse 104   Resp 18   Temp 97.4 °F (36.3 °C)   Temp src Temporal   SpO2 98 %   O2 Device None (Room air)       Current Vitals:   Vital Signs  BP: 115/76  Pulse: 77  Resp: 15  Temp: 97.4 °F (36.3 °C)  Temp src: Temporal      Physical Exam  Vitals and nursing note reviewed.   Constitutional:       General: She is not in acute distress.     Appearance: Normal appearance.   HENT:      Head: Normocephalic.      Nose: Nose normal.      Mouth/Throat:      Mouth: Mucous membranes are moist.   Eyes:      Extraocular Movements: Extraocular movements intact.      Pupils: Pupils are equal, round, and reactive to light.   Cardiovascular:      Rate and Rhythm: Normal rate and regular rhythm.      Pulses: Normal pulses.   Pulmonary:      Effort: Pulmonary effort is normal.   Abdominal:      General: Abdomen is flat. Bowel sounds are normal. There is distension.      Palpations: Abdomen is soft.      Tenderness: There is generalized abdominal tenderness. There is no right CVA tenderness, left CVA tenderness, guarding or rebound.      Hernia: No hernia is present.   Musculoskeletal:         General: No swelling or tenderness. Normal range of motion.      Cervical back: Normal range of motion.   Skin:     General: Skin is warm and dry.   Neurological:      Mental Status: She is alert and oriented to person, place, and time. Mental status is at baseline.   Psychiatric:         Mood and Affect: Mood normal.      Labs Reviewed   COMP METABOLIC PANEL (14) - Abnormal; Notable for the following components:       Result Value      Glucose 161 (*)       BUN 32 (*)       Creatinine 1.71 (*)       Calcium, Total 10.9 (*)       eGFR-Cr 28 (*)       Total Protein 8.3 (*)       Albumin 5.2 (*)        All other components within normal limits   URINALYSIS WITH CULTURE REFLEX - Abnormal; Notable for the following components:     Clarity Urine Turbid (*)       Ketones Urine Trace (*)       Protein Urine 50 (*)       Urobilinogen Urine 2 (*)       Leukocyte Esterase Urine 75 (*)       WBC Urine 6-10 (*)       RBC Urine 3-5 (*)       Squamous Epi. Cells Few (*)       Hyaline Casts Present (*)       All other components within normal limits   CBC W/ DIFFERENTIAL - Abnormal; Notable for the following components:     WBC 13.1 (*)       RBC 6.19 (*)       HGB 17.9 (*)       HCT 53.0 (*)       Neutrophil Absolute Prelim 11.39 (*)       Neutrophil Absolute 11.39 (*)       Lymphocyte Absolute 0.45 (*)       Monocyte Absolute 1.07 (*)       All other components within normal limits   LIPASE - Normal   URINE CULTURE, ROUTINE       Medical Decision Making    BUN of 32, creatinine 1.71 and a GFR of 28  White blood cell count 13.1 hemoglobin 17.9      ER course  Here the patient is afebrile and hemodynamically stable.  Her abdomen is distended and she is tender.  Patient does have a leukocytosis but also signs of dehydration and she has elevated BUN and creatinine.  Patient's urinalysis shows some pyuria so red blood cells.  Pt will need to admitted  for IV hydration. CT scan is pending.     Disposition and Plan      Clinical Impression:  1. WAYNE (acute kidney injury) (HCC)    2. Nausea and vomiting, unspecified vomiting type        CT ABDOMEN+PELVIS(CPT=74176)   Findings consistent with high-grade distal small-bowel obstruction with    possible closed loop small bowel obstruction.  Surgical consultation    advised.   Patient signed out to me by Dr. Bhatt  Findings noted above.  I discussed the case with Dr. Rouse general surgery.  NG tube will be placed.  I updated the hospitalist regarding the above findings.  Patient without any peritoneal signs.  Hemodynamically stable.  Patient will be admitted to the floor for further  care.    Addendum: Lactic acid is normal.  Unable to pass an NG tube after multiple attempts.  I did discuss with general surgery for further management.  Dr. Rouse recommended patient to be transferred to the floor for NG placement.      7/22:    SURGERY:    Catherine Bustamante is a a(n) 89 year old female.Patient is lethargic, no family at bedside.  Patient c/o abdominal that began 1-2 days ago.She reports her pain was initially intermittent, she thought she may have been constipated.  She reports her pain was across her mid abdomen, notes n/v associated pain.   CT scan obtained revealing SBO, transition point noted in right abdomen, question of additional transition point   NG tube was placed, approx 800cc in cannister at time of consult.   Currently reports abdominal pain 2/10, she c/o throat pain   Patient cannot recall previous abdominal surgery  Reports that she takes asa 81 mg daily      Blood pressure 101/52, pulse 87, temperature 98.4 °F (36.9 °C), temperature source Oral, resp. rate 16, height 5' 3\" (1.6 m), weight 100 lb 14.4 oz (45.8 kg), SpO2 93%, not currently breastfeeding.     GENERAL: lethargic, well developed, well nourished female, in no apparent distress      ABDOMEN: soft non tender upper quadrants,  distended with fullness across lower abdomen, moderate tenderness in lower abdomen       Lab 07/21/24  1510 07/22/24  0520   WBC 13.1* 9.4   HGB 17.9* 15.6   MCV 85.6 85.3   .0 238.0       137   K 4.9 4.1   CL 99 100   CO2 28.0 29.0   BUN 32* 37*   CREATSERUM 1.71* 1.15*   * 121*   CA 10.9* 10.1     Impression:     88 y/o with SBO  CT scan as noted above  Afebrile, no leukocytosis  Lethargic, no family at bedside, soft and non tender in upper quadrants, though fullness with moderate tenderness across lower abdomen   -abdominal pain improved to 2/10 after NG tube was placed     Plan:     Reviewed with patient recommendations for conservative management   -NPO ice chips  -NG to  LIS  -serial abdominal exams  -IV fluids  -encourage ambulation  -obstructive series  -IV tylenol   -IV protonix   Explained to patient that if their condition deteriorates, they may require surgical management.      History and Physical     Patient is a 89-year-old female significant past medical history of moderate protein calorie malnutrition, history of SBO in the past requiring ex lap with ASIF, history of SBO status post ex lap with lysis of adhesions in the past in October 2022, polycythemia, hypothyroidism, schizoaffective disorder, hyperlipidemia, aortic atherosclerosis, presented with abdominal distention as well as vomiting.  Most of the history is obtained from the daughter, patient is complaint of vomiting the night before the following day when daughter visited she continued to have abdominal pain as well as vomiting which is when the daughter knew that she had this obstruction 2 years ago and brought her into the hospital.     In the emergency room vitals have been stable, BMP is unremarkable except for a creatinine of 1.7, white count of 13.1 hemoglobin is 17.9 UA showed 6-10 WBCs CT abdomen pelvis was done that showed high-grade distal small bowel obstruction with possible closed-loop small bowel obstruction, general surgery was consulted, lactic acid was within normal limits     This morning patient has an NG tube in place to low intermittent suction, states that she is passing gas but no bowel movement minimal abdominal pain,    OBJECTIVE:  /51 (BP Location: Left arm)   Pulse 89   Temp 98.6 °F (37 °C) (Oral)   Resp 16   Ht 5' 3\" (1.6 m)   Wt 100 lb 14.4 oz (45.8 kg)   SpO2 95%   BMI 17.87 kg/m²       Lab 07/21/24  1510 07/22/24  0520   WBC 13.1* 9.4   HGB 17.9* 15.6   MCV 85.6 85.3   .0 238.0       137   K 4.9 4.1   CL 99 100   CO2 28.0 29.0   BUN 32* 37*   CREATSERUM 1.71* 1.15*   * 121*   CA 10.9* 10.1            ASSESSMENT / PLAN:            Patient is a  89-year-old female significant past medical history of moderate protein calorie malnutrition, history of SBO in the past requiring ex lap with ASIF, history of SBO status post ex lap with lysis of adhesions in the past in October 2022, polycythemia, hypothyroidism, schizoaffective disorder, hyperlipidemia, aortic atherosclerosis, presented with abdominal distention as well as vomiting M, imaging consistent with SBO     # Closed-loop SBO  -History of SBO in the past, n.p.o., NG tube to low intermittent suction  -Lactic acid within normal limits, serial abdominal exams  -General surgery on board, IV fluids, abdominal x-rays  -Discussed with general surgery as well as daughter        # WAYNE  -Creatinine admission was 1.71, baseline is 0.87, likely prerenal from nausea vomiting  -Continue fluids at this time  # Hypothyroidism  -Resume Synthroid when taking p.o.     # Hyperlipidemia  # Carotid artery stenosis aortic atherosclerosis      Prophy:  DVT: Heparin  Deconditioning prevention: PT OT     MEDICATIONS ADMINISTERED IN LAST 1 DAY:    heparin (Porcine) 5000 UNIT/ML injection 5,000 Units       Date Action Dose Route User    7/22/2024 0459 Given 5,000 Units Subcutaneous (Left Lower Abdomen) Beba Zarco RN          lactated ringers infusion 100/hr      Date Action Dose Route User    7/22/2024 0758 New Bag (none) Intravenous Taj Paul RN    7/21/2024 2230 Restarted (none) Intravenous Beba Zarco RN     LORazepam (Ativan) 2 mg/mL injection 0.5 mg       Date Action Dose Route User    7/21/2024 1801 Given 0.5 mg Intravenous Raysa Grijalva RN          LORazepam (Ativan) 2 mg/mL injection 0.5 mg       Date Action Dose Route User    7/21/2024 1837 Given 0.5 mg Intravenous Raysa Grijalva RN          morphINE PF 2 MG/ML injection 2 mg       Date Action Dose Route User    7/22/2024 0532 Given 2 mg Intravenous Beba Zarco RN          ondansetron (Zofran) 4 MG/2ML injection 4 mg       Date Action Dose Route  User    7/21/2024 1510 Given 4 mg Intravenous Raysa Grijalva, BETTYE          ondansetron (Zofran) 4 MG/2ML injection 4 mg       Date Action Dose Route User    7/22/2024 0429 Given 4 mg Intravenous Marielos Babb RN          pantoprazole (Protonix) 40 mg in sodium chloride 0.9% PF 10 mL IV push       Date Action Dose Route User    7/22/2024 0929 Given 40 mg Intravenous Taj Paul RN          sodium chloride 0.9 % IV bolus 1,000 mL       Date Action Dose Route User    7/21/2024 1510 New Bag 1,000 mL Intravenous Raysa Grijalva RN            Vitals (last day)       Date/Time Temp Pulse Resp BP SpO2 Weight O2 Device O2 Flow Rate (L/min) [facility]    07/22/24 1140 98.6 °F (37 °C) 89 16 115/51 95 % -- None (Room air) --     07/22/24 1037 -- 99 -- -- -- -- -- --     07/22/24 1025 -- 90 -- -- 100 % -- -- --     07/22/24 0855 98.5 °F (36.9 °C) 86 16 103/51 94 % -- None (Room air) --     07/22/24 0448 98.4 °F (36.9 °C) 87 16 101/52 93 % -- Nasal cannula 2 L/min     07/22/24 0114 98.9 °F (37.2 °C) 92 16 107/53 93 % -- Nasal cannula 2 L/min     07/21/24 2205 99.8 °F (37.7 °C) 93 20 92/58 92 % 100 lb 14.4 oz (45.8 kg) Nasal cannula 2 L/min DI

## 2024-07-22 NOTE — PHYSICAL THERAPY NOTE
PT order received. Chart reviewed. Attempted to see pt for initial evaluation. Pt very lethargic this date, unable to stay awake to hold a conversation. RN aware. Will re-attempt once appropriate and as able. RN in agreement.

## 2024-07-22 NOTE — H&P
[facility] Hospitalist H&P       CC:   Chief Complaint   Patient presents with    Abdominal Pain    Vomiting        PCP: Flynn Polk MD    History of Present Illness:    Patient is a 89-year-old female significant past medical history of moderate protein calorie malnutrition, history of SBO in the past requiring ex lap with ASIF, history of SBO status post ex lap with lysis of adhesions in the past in October 2022, polycythemia, hypothyroidism, schizoaffective disorder, hyperlipidemia, aortic atherosclerosis, presented with abdominal distention as well as vomiting.  Most of the history is obtained from the daughter, patient is complaint of vomiting the night before the following day when daughter visited she continued to have abdominal pain as well as vomiting which is when the daughter knew that she had this obstruction 2 years ago and brought her into the hospital.    In the emergency room vitals have been stable, BMP is unremarkable except for a creatinine of 1.7, white count of 13.1 hemoglobin is 17.9 UA showed 6-10 WBCs CT abdomen pelvis was done that showed high-grade distal small bowel obstruction with possible closed-loop small bowel obstruction, general surgery was consulted, lactic acid was within normal limits    This morning patient has an NG tube in place to low intermittent suction, states that she is passing gas but no bowel movement minimal abdominal pain,  PMH  Past Medical History:    Calculus of kidney    Calculus of right kidney ~ 2mm Dx CT Scan [5/11] - KATHERINE Sandhu MD    Diverticulosis of colon (without mention of hemorrhage)    JOSÉ MIGUEL (generalized anxiety disorder)    H/O [5/11] hematuria / [facility] CT Scan Abdomen 2mm right sided kidney stone - KATHERINE Sandhu MD -    High cholesterol    HYPERLIPIDEMIA    KIDNEY STONE    Nausea and vomiting, unspecified vomiting type    Osteoporosis Screening [2/18] / Postmenopausal status --> NORMAL    [02/18] T Score femoral neck -0.6    Plantar fascial fibromatosis*    Renal cyst Dx  () -    S/P cataract extraction of both eyes with insertion of intraocular lens - [facility] [place]    S/P Colonoscopy & LGI () not to be rechecked as risk > benefit - [facility] []    S/P Subtotal Thyroidectomy - Dr. Corbett    S/P [7/15] cholecystectomy - EMILY Rouse*    Unspecified disorder of thyroid    Vertigo        PSH  Past Surgical History:   Procedure Laterality Date    Cholecystectomy      Hemorrhoidectomy      Laparoscopic cholecystectomy N/A 2015    Procedure: LAPAROSCOPIC CHOLECYSTECTOMY;  Surgeon: Toney Rouse MD;  Location:  MAIN OR    Other      thyroid removed    Other surgical history  11    cysto, Dr. Sandhu    Other surgical history  08/10/2021    ANAL CANAL POLYP - [facility]     Removal gallbladder      Removal of tonsils,12+ y/o          ALL:  Allergies   Allergen Reactions    Latex RASH    Latex RASH        Home Medications:  Outpatient Medications Marked as Taking for the 24 encounter (Hospital Encounter)   Medication Sig Dispense Refill    atorvastatin 20 MG Oral Tab Take 1 tablet (20 mg total) by mouth nightly. 90 tablet 3    levothyroxine 50 MCG Oral Tab Take 1 tablet (50 mcg total) by mouth daily. ON AN EMPTY STOMACH 90 tablet 3    ASPIRIN 81 OR Take by mouth as needed (Taking as needed for sleep).      Calcium Carbonate 1500 (600 Ca) MG Oral Tab TAKE 1 TABLET DAILY.      Calcium-Vitamin D (CALTRATE 600 PLUS-VIT D OR) Take  by mouth.      Cholecalciferol (VITAMIN D3) 1000 UNITS Oral Cap Take  by mouth daily.      CENTRUM OR 1TABLET DAILY           Soc Hx  Social History     Tobacco Use    Smoking status: Former     Current packs/day: 0.00     Types: Cigarettes     Quit date: 1955     Years since quittin.0    Smokeless tobacco: Never   Substance Use Topics    Alcohol use: No        Fam Hx  Family History   Problem Relation Age of Onset    Breast Cancer Paternal Grandmother 60        dx 60       Review of Systems  General: Denies unintentional weight  loss, fevers, or chills-negative except for above  HEENT: Denies vision loss or double vision, denies hearing loss  Cardiovascular: Denies chest pain, palpitations, peripheral edema  Pulmonary: Denies cough, shortness of breath, or wheezing  Gastrointestinal: Denies abdominal pain, melena, or hematochezia  Genitourinary: Denies urinary frequency, urgency, and dysuria  Neurologic: Denies numbness, headaches, focal weakness  Skin: Denies rashes, sores  Endocrine: Denies heat or cold intolerance, denies polydipsia  Hematologic: Denies abnormal bleeding or bruising     OBJECTIVE:  /51 (BP Location: Left arm)   Pulse 89   Temp 98.6 °F (37 °C) (Oral)   Resp 16   Ht 5' 3\" (1.6 m)   Wt 100 lb 14.4 oz (45.8 kg)   SpO2 95%   BMI 17.87 kg/m²     BP Readings from Last 3 Encounters:   07/22/24 115/51   10/28/23 142/82   10/28/23 123/71     Wt Readings from Last 3 Encounters:   07/21/24 100 lb 14.4 oz (45.8 kg)   10/28/23 100 lb (45.4 kg)   10/28/23 132 lb (59.9 kg)       Wt Readings from Last 6 Encounters:   07/21/24 100 lb 14.4 oz (45.8 kg)   10/28/23 100 lb (45.4 kg)   10/28/23 132 lb (59.9 kg)   10/09/22 120 lb (54.4 kg)   12/06/21 127 lb 6.4 oz (57.8 kg)   10/26/21 129 lb 3.2 oz (58.6 kg)     Gen: No acute distress, alert and oriented x 3  Pulm: Lungs clear bilaterally, good inspiratory effort   CV:  nL S1/S2  Abd: Slightly distended hypoactive bowel sounds  MSK: moving all extremities, no edema  Neuro: no focal deficits  Skin: no rashes/lesions  Psych: normal mood/affect          Diagnostic Data:    CBC/Chem  Recent Labs   Lab 07/21/24  1510 07/22/24  0520   WBC 13.1* 9.4   HGB 17.9* 15.6   MCV 85.6 85.3   .0 238.0       Recent Labs   Lab 07/21/24  1510 07/22/24  0520    137   K 4.9 4.1   CL 99 100   CO2 28.0 29.0   BUN 32* 37*   CREATSERUM 1.71* 1.15*   * 121*   CA 10.9* 10.1       Recent Labs   Lab 07/21/24  1510   ALT 17   AST 16   ALB 5.2*       No results for input(s): \"TROP\" in the  last 168 hours.        Radiology: XR CHEST AP PORTABLE  (CPT=71045)    Result Date: 7/22/2024  PROCEDURE:  XR CHEST AP PORTABLE  (CPT=71045)  TECHNIQUE:  AP chest radiograph was obtained.  COMPARISON:  [facility] , XR, XR CHEST AP PORTABLE  (CPT=71045), 10/28/2023, 12:54 PM.  INDICATIONS:  Verify correct tube placement  PATIENT STATED HISTORY: (As transcribed by Technologist)  Patient offered no additional history at this time.     FINDINGS:  Enteric tube overlies the expected region of the stomach.  The remainder of the chest is essentially unchanged.  Probable calcified granulomas are present.  No new focal consolidation or pleural effusion is seen.            CONCLUSION:  See above.   LOCATION:  [facility]      Dictated by (CST): Freddy Thompson MD on 7/22/2024 at 9:47 AM     Finalized by (CST): Freddy Thompson MD on 7/22/2024 at 9:49 AM       CT ABDOMEN+PELVIS(CPT=74176)    Result Date: 7/21/2024  PROCEDURE:  CT ABDOMEN+PELVIS (CPT=74176)  COMPARISON:  [facility] , CT, CT ABDOMEN PELVIS IV CONTRAST, NO ORAL (ER), 10/09/2022, 6:03 AM.  INDICATIONS:  Abdominal pain, possible small bowel obstruction  TECHNIQUE:  Unenhanced multislice CT scanning was performed from the dome of the diaphragm to the pubic symphysis.  Dose reduction techniques were used. Dose information is transmitted to the ACR (American College of Radiology) NRDR (National Radiology Data Registry) which includes the Dose Index Registry.  PATIENT STATED HISTORY: (As transcribed by Technologist)  Fatigue and intermittent abd pain.    FINDINGS:  LIVER:  No enlargement, atrophy, abnormal density, or significant focal lesion.  BILIARY:  Cholecystectomy with expected prominence of the common bile duct. PANCREAS:  No lesion, fluid collection, ductal dilatation, or atrophy.  SPLEEN:  No enlargement or focal lesion.  KIDNEYS:  No mass, obstruction, or calcification.  ADRENALS:  No mass or enlargement.  AORTA/VASCULAR:    Extensive calcified atherosclerosis without aneurysm.  RETROPERITONEUM:  No mass or adenopathy.  BOWEL/MESENTERY:  Marked gastric and small bowel dilation with transition point of the right mid abdomen (series 2, image 56, series 4, image 22) consistent with high-grade distal small-bowel obstruction.  There may be a 2nd point of transition in close proximity (series 4, image 20) raising the possibility of closed loop small bowel obstruction.  The distal small bowel and colon are decompressed.  Fluid and gas noted within the dilated small bowel without definite pneumatosis.  No portal/mesenteric venous gas.  Extensive distal colonic diverticulosis. ABDOMINAL WALL:  No mass or hernia.  URINARY BLADDER:  No visible focal wall thickening, lesion, or calculus.  PELVIC NODES:  No adenopathy.  PELVIC ORGANS:  No visible mass.  Pelvic organs appropriate for patient age.  BONES:  No bony lesion or fracture.  Osteoarthritis of the hips and spine. LUNG BASES:  No visible pulmonary or pleural disease.  OTHER:  Negative.             CONCLUSION:   Findings consistent with high-grade distal small-bowel obstruction with possible closed loop small bowel obstruction.  Surgical consultation advised.  Findings discussed with Dr. Rodriguez at 5:15 p.m. Central standard time on 07/21/2024.   LOCATION:  [facility]   Dictated by (CST): Nithin Beauchamp MD on 7/21/2024 at 5:11 PM     Finalized by (CST): Nithin Beauchamp MD on 7/21/2024 at 5:19 PM              ASSESSMENT / PLAN:         Patient is a 89-year-old female significant past medical history of moderate protein calorie malnutrition, history of SBO in the past requiring ex lap with ASIF, history of SBO status post ex lap with lysis of adhesions in the past in October 2022, polycythemia, hypothyroidism, schizoaffective disorder, hyperlipidemia, aortic atherosclerosis, presented with abdominal distention as well as vomiting M, imaging consistent with SBO    # Closed-loop SBO  -History of SBO in the past, n.p.o., NG tube to low intermittent suction  -Lactic  acid within normal limits, serial abdominal exams  -General surgery on board, IV fluids, abdominal x-rays  -Discussed with general surgery as well as daughter      # WAYNE  -Creatinine admission was 1.71, baseline is 0.87, likely prerenal from nausea vomiting  -Continue fluids at this time  # Hypothyroidism  -Resume Synthroid when taking p.o.    # Hyperlipidemia  # Carotid artery stenosis aortic atherosclerosis      Prophy:  DVT: Heparin  Deconditioning prevention: PT OT    Dispo: admit to [facility] with telemetry    Outpatient records reviewed confirming patient's medical history and medications.     Further recommendations pending patient's clinical course.  [facility] hospitalist to continue to follow patient while in house    Time spent: greater than 95 minutes spent in d/w pt/family, coordination of care, and d/w staff.     Hans stein MD   Internal Medicine  DM Hospitalist  Pager: [phone]      **Certification      PHYSICIAN Certification of Need for Inpatient Hospitalization - Initial Certification    Patient will require inpatient services that will reasonably be expected to span two midnight's based on the clinical documentation in H+P.   Based on patients current state of illness, I anticipate that, after discharge, patient will require TBD.

## 2024-07-22 NOTE — DIETARY NOTE
[facility]   part of [facility]    NUTRITION ASSESSMENT    Unable to diagnose malnutrition criteria at this time.      NUTRITION INTERVENTION:      Meal and Snacks - Monitor and encourage adequate PO intake.   Medical Food Supplements - Will evaluate need when diet is advanced. Rationale/use for oral supplements discussed.  Coordination of Nutrition Care - GI/Surgery consult for nutrition support/diet advancement       PATIENT STATUS: 07/22/24 89 year old female admitted with abdominal, n/v. Chart reviewed for Low BMI  CT scan obtained revealing SBO. Pt with NG. Currently sleeping, no family at bedside. Unsure of PO and wt hx. Mod to mild wasting noted. Per surgery, conservative management at this time. RD to follow for diet advancement    ANTHROPOMETRICS:  Ht: 160 cm (5' 3\")  Wt: 45.8 kg (100 lb 14.4 oz).   BMI: Body mass index is 17.87 kg/m².  IBW: 52.3 kg      WEIGHT HISTORY:   Weight loss: No    Wt Readings from Last 10 Encounters:   07/21/24 45.8 kg (100 lb 14.4 oz)   10/28/23 45.4 kg (100 lb)   10/28/23 59.9 kg (132 lb)   10/09/22 54.4 kg (120 lb)   12/06/21 57.8 kg (127 lb 6.4 oz)   10/26/21 58.6 kg (129 lb 3.2 oz)   08/19/21 58.1 kg (128 lb)   08/05/21 58.5 kg (129 lb)   06/04/21 58.5 kg (129 lb)   12/04/20 59.6 kg (131 lb 6.4 oz)        NUTRITION:  Diet:       Procedures    NPO      Food Allergies: No  Cultural/Ethnic/Restorationist Preferences Addressed: Yes    Percent Meals Eaten (last 3 days)       None            GI system review:  NPO, NG tube  Last BM: PTA  Skin and wounds: intact    NUTRITION RELATED PHYSICAL FINDINGS:     1. Body Fat/Muscle Mass: moderate depletion body fat Midaxillary line and mild muscle depletion Temple region, Clavicle region, and Dorsal hand region     2. Fluid Accumulation: none per RN documentation     NUTRITION PRESCRIPTION: 45.8kg  Calories: 9309-6529 calories/day (30-35 kcal/kg)  Protein: 67-90 grams protein/day (1.5-2.0 grams protein per kg)  Fluid: ~1 ml/kcal or per  MD discretion    NUTRITION DIAGNOSIS/PROBLEM:  Inadequate oral intake related to altered GI function/GI disorder as evidenced by need for NPO status      MONITOR AND EVALUATE/NUTRITION GOALS:  Weight stable within 1 to 2 lbs during admission - New  Return to PO intake or advance diet in 24-48 hrs - New      MEDICATIONS:  LR @ 100mL, protonix    LABS:  Reviewed    Pt is at High nutrition risk    Ambika Tai RD, LDN  Clinical Nutrition  [id number]

## 2024-07-23 ENCOUNTER — APPOINTMENT (OUTPATIENT)
Dept: GENERAL RADIOLOGY | Facility: HOSPITAL | Age: 89
End: 2024-07-23
Attending: PHYSICIAN ASSISTANT
Payer: MEDICARE

## 2024-07-23 LAB
ERYTHROCYTE [DISTWIDTH] IN BLOOD BY AUTOMATED COUNT: 14.1 %
HCT VFR BLD AUTO: 42.6 %
HGB BLD-MCNC: 13.9 G/DL
MCH RBC QN AUTO: 28.5 PG (ref 26–34)
MCHC RBC AUTO-ENTMCNC: 32.6 G/DL (ref 31–37)
MCV RBC AUTO: 87.3 FL
PLATELET # BLD AUTO: 181 10(3)UL (ref 150–450)
RBC # BLD AUTO: 4.88 X10(6)UL
WBC # BLD AUTO: 6.1 X10(3) UL (ref 4–11)

## 2024-07-23 PROCEDURE — 74019 RADEX ABDOMEN 2 VIEWS: CPT | Performed by: PHYSICIAN ASSISTANT

## 2024-07-23 PROCEDURE — 85027 COMPLETE CBC AUTOMATED: CPT | Performed by: PHYSICIAN ASSISTANT

## 2024-07-23 NOTE — PROGRESS NOTES
A&Ox2-3. Forgetful at times. VSS. 2L via NC. .  Telemetry: NSR  GI: Abdomen soft, nondistended. Denies passing gas.  Denies nausea. Hypoactive bowel sounds   : Voids.  Denies pain at this time   Up with standby assist and a walker   Drains: NGT to LIS w/ thick brown output   Diet: NPO   IVF running per order.  All appropriate safety measures in place. All questions and concerns addressed.

## 2024-07-23 NOTE — PLAN OF CARE
A&Ox2-3. VSS. RA. . Denies chest pain and SOB.   GI: Abdomen soft, nondistended. No gas present, no belching present - hypoactive bowel sounds.   Denies nausea.   : Voids.   Pain controlled with PRN pain medications.   Up with standby assist/walker  Drains: NGT LIS - 60cm   Incisions: None  Diet: NPO - ice-chips   IVF running per order. All appropriate safety measures in place. All questions and concerns addressed.    NGT residual post clamp trial - 120mL - pt remains asymptomatic  Per Dr. Whiteside, repeat 6hr clamp trial - re check residual at 2230

## 2024-07-23 NOTE — PROGRESS NOTES
[facility]  Progress Note    Catherine Bustamante Patient Status:  Inpatient    1935 MRN [id number]   Location [facility] 3NW-A Attending Hans Marino MD   Hosp Day # 2 PCP Flynn Polk MD     Subjective:    Patient confused, she denies any pain  Obs series reviewed, non obstructive gas pattern, no dilated loops of small bowel    Objective/Physical Exam:    Vital Signs:  Blood pressure 123/48, pulse 77, temperature 98.4 °F (36.9 °C), temperature source Oral, resp. rate 16, height 5' 3\" (1.6 m), weight 100 lb 14.4 oz (45.8 kg), SpO2 96%, not currently breastfeeding.    General:  Alert, confused,  Cooperative.  No apparent distress.    Lungs:  Non labored breathing    Cardiac:  Regular rate and rhythm.     Abdomen:  soft, non tender, non distended    Extremities:  No lower extremity edema noted.  Without clubbing or cyanosis.           Xray:  Reviewed    Problem List:  Patient Active Problem List   Diagnosis    Hyperlipidemia, [mixed] / Rx: Atorvastain / LDL Goal < 160 mg/dl - S. [place]    Elevated Glucose / HbA1C < 6.0%    Polycythemia - PAOLA 2 mutation negative [HCC]    Vitamin D deficiency Rx vitamin d 3 [cholecalciferol]    Screening mammogram for breast cancer    Hypothyroidism    Benign paroxysmal positional vertigo due to bilateral vestibular disorder    Anxiety disorder    Asymptomatic carotid artery stenosis, bilateral    Chronic fatigue and malaise    LBBB (left bundle branch block)    Pure hypercholesterolemia    Vertigo    Schizoaffective disorder (HCC)    PVC's (premature ventricular contractions)    Anal polyp    Hyponatremia    Acute kidney injury (HCC)    Azotemia    Hyperglycemia    SBO (small bowel obstruction) (HCC)    Chest pain of uncertain etiology    WAYNE (acute kidney injury) (HCC)    Nausea and vomiting, unspecified vomiting type       Impression:     89 y/o with SBO  -NG output 2000cc charted/24hr  -obs series with resolution of dilated loops of small bowel    Plan:  NPO ice  chips  Will clamp NG tube x 6 hrs if pain, n/v reconnect to LIS, check residual after 6 hours if less than 150cc can remove and start clears.  Encourage ambulation  BETTYE Kearns PA  [facility] Surgery  7/23/2024

## 2024-07-23 NOTE — PLAN OF CARE
Problem: Patient/Family Goals  Goal: Patient/Family Long Term Goal  Description: Patient's Long Term Goal: To Eat     Interventions:  - Obstructive Series X-Rays  -Clamping Trial  -Removal of NG Tube  -Trial of Clears  -Advancement of Diet  - See additional Care Plan goals for specific interventions  Outcome: Progressing  Goal: Patient/Family Short Term Goal  Description: Patient's Short Term Goal: Removal of NG Tube    Interventions:   - Clamping Trial  Outcome: Progressing     Problem: PAIN - ADULT  Goal: Verbalizes/displays adequate comfort level or patient's stated pain goal  Description: INTERVENTIONS:  - Encourage pt to monitor pain and request assistance  - Assess pain using appropriate pain scale  - Administer analgesics based on type and severity of pain and evaluate response  - Implement non-pharmacological measures as appropriate and evaluate response  - Consider cultural and social influences on pain and pain management  - Manage/alleviate anxiety  - Utilize distraction and/or relaxation techniques  - Monitor for opioid side effects  - Notify MD/LIP if interventions unsuccessful or patient reports new pain  - Anticipate increased pain with activity and pre-medicate as appropriate  Outcome: Progressing     Problem: RISK FOR INFECTION - ADULT  Goal: Absence of fever/infection during anticipated neutropenic period  Description: INTERVENTIONS  - Monitor WBC  - Administer growth factors as ordered  - Implement neutropenic guidelines  Outcome: Progressing     Problem: SAFETY ADULT - FALL  Goal: Free from fall injury  Description: INTERVENTIONS:  - Assess pt frequently for physical needs  - Identify cognitive and physical deficits and behaviors that affect risk of falls.  - Ewing fall precautions as indicated by assessment.  - Educate pt/family on patient safety including physical limitations  - Instruct pt to call for assistance with activity based on assessment  - Modify environment to reduce risk of  injury  - Provide assistive devices as appropriate  - Consider OT/PT consult to assist with strengthening/mobility  - Encourage toileting schedule  Outcome: Progressing     Problem: DISCHARGE PLANNING  Goal: Discharge to home or other facility with appropriate resources  Description: INTERVENTIONS:  - Identify barriers to discharge w/pt and caregiver  - Include patient/family/discharge partner in discharge planning  - Arrange for needed discharge resources and transportation as appropriate  - Identify discharge learning needs (meds, wound care, etc)  - Arrange for interpreters to assist at discharge as needed  - Consider post-discharge preferences of patient/family/discharge partner  - Complete POLST form as appropriate  - Assess patient's ability to be responsible for managing their own health  - Refer to Case Management Department for coordinating discharge planning if the patient needs post-hospital services based on physician/LIP order or complex needs related to functional status, cognitive ability or social support system  Outcome: Progressing  The patient has stable vital signs, and she denies having any pain when asked. Nasogastric tube remains to low intermittent suction, 1300 ml of tan colored fluid obtained this shift.

## 2024-07-23 NOTE — CM/SW NOTE
07/23/24 1400   CM/SW Referral Data   Referral Source    Reason for Referral Discharge planning   Informant Daughter   Patient Info   Patient's Current Mental Status at Time of Assessment Alert;Memory Impairments   Patient's Home Environment Independent Living   Patient lives with Son  (son w/mental disability)   Patient Status Prior to Admission   Independent with ADLs and Mobility No   Pt. requires assistance with Housework;Driving;Meals   Discharge Needs   Anticipated D/C needs To be determined   Choice of Post-Acute Provider   Informed patient of right to choose their preferred provider Yes     CM self referred to case for discharge planning.    Pt is a 89 year old female admitted w/vomiting, SBO, has NGT to LIS    Met w/pt at the bedside for eval and she did not remember where she lives ([facility])  Called daughter, Maryan, and she reports pt lives with her mentally disabled son, Andrés. Pt and her son have cleaning services and go to meals in the dining room.   Daughter states pt organizes her and her son's medications w/labeled pill boxes.  She reports she noted some extra pills on the counter near pill box but feels overall, pt is accurate with their medications  Daughter and her other brother, Brandon, visit a few times a week.    Discussed safe discharge plan and Maryan feels her mom is forgetful, but still functions safely in her familiar surroundings.  They are considering increasing services and supervision.  Also emailed her a list of caregivers and contact info for [facility].    Called [facility] and spoke w/Shazia.  They have a program called ALS ([employer]) with caregivers that can assist with medications and ADLs.  She reports pt's son manages fine with getting to his meals and ADLs.    PT/OT pending. Per chart review, pt has hx rehab at [facility] in 2022.     Discussed HH services w/daughter and she agrees this would be a good plan for  nursing/medication oversight and therapy if pt below baseline.  HH referral sent via [employer].    / to remain available for support and/or discharge planning.     Gabriela Sin MBA MSN, RN CTL/  [id number]

## 2024-07-24 ENCOUNTER — APPOINTMENT (OUTPATIENT)
Dept: GENERAL RADIOLOGY | Facility: HOSPITAL | Age: 89
End: 2024-07-24
Attending: PHYSICIAN ASSISTANT
Payer: MEDICARE

## 2024-07-24 PROCEDURE — 74021 RADEX ABDOMEN 3+ VIEWS: CPT | Performed by: PHYSICIAN ASSISTANT

## 2024-07-24 PROCEDURE — 97161 PT EVAL LOW COMPLEX 20 MIN: CPT

## 2024-07-24 PROCEDURE — 97530 THERAPEUTIC ACTIVITIES: CPT

## 2024-07-24 NOTE — PROGRESS NOTES
A&Ox2-3. Confused. VSS. 2L via NC NOC. .  Telemetry: NSR  GI: Abdomen soft, nondistended. Denies passing gas.  Denies nausea. Hypoactive bowel sounds   : Voids.  Pain controlled with PRN pain medications  Up with standby assist and a walker   Drains: NGT- check residual for clamp trial @ 2230, was 200 mL of thick brown output. Dr. Whiteside aware, reconnected to LIS.   Diet: NPO w/ ice chips   IVF running per order.  All appropriate safety measures in place. All questions and concerns addressed.

## 2024-07-24 NOTE — PROGRESS NOTES
[facility]   part of [facility]     Hospitalist Progress Note     Catherine Bustamante Patient Status:  Inpatient    1935 MRN [id number]   Location [facility] 3NW-A Attending Hans Marino MD   Hosp Day # 2 PCP Flynn Polk MD     Chief Complaint: see HPI    Subjective:     Patient is doing well, NG tube has been clamped, appears to be very confused, does not remember any surgery that she had last 2 years ago  , Denies any flatus or bowel movement    Objective:    Review of Systems:   A comprehensive review of systems was completed; pertinent positive and negatives stated in subjective.    Vital signs:  Temp:  [98 °F (36.7 °C)-98.6 °F (37 °C)] 98.6 °F (37 °C)  Pulse:  [77-93] 89  Resp:  [14-20] 14  BP: (116-136)/(48-67) 136/67  SpO2:  [93 %-100 %] 98 %    Physical Exam:        Gen: No acute distress, alert and oriented x 3  Pulm: Lungs clear bilaterally, good inspiratory effort   CV:  nL S1/S2  Abd: Slightly distended hypoactive bowel sounds  MSK: moving all extremities, no edema  Neuro: no focal deficits  Skin: no rashes/lesions  Psych: normal mood/affect    Diagnostic Data:    Labs:  Recent Labs   Lab 24  1510 24  0520 24  0907   WBC 13.1* 9.4 6.1   HGB 17.9* 15.6 13.9   MCV 85.6 85.3 87.3   .0 238.0 181.0       Recent Labs   Lab 24  1510 24  0520   * 121*   BUN 32* 37*   CREATSERUM 1.71* 1.15*   CA 10.9* 10.1   ALB 5.2*  --     137   K 4.9 4.1   CL 99 100   CO2 28.0 29.0   ALKPHO 74  --    AST 16  --    ALT 17  --    BILT 1.1  --    TP 8.3*  --        Estimated Creatinine Clearance: 24 mL/min (A) (based on SCr of 1.15 mg/dL (H)).    No results for input(s): \"TROP\", \"TROPHS\", \"CK\" in the last 168 hours.    No results for input(s): \"PTP\", \"INR\" in the last 168 hours.               Microbiology    Hospital Encounter on 24   1. Urine Culture, Routine     Status: None    Collection Time: 24  3:46 PM    Specimen: Urine, clean catch    Result Value Ref Range    Urine Culture No Growth at 18-24 hrs. N/A         Imaging: Reviewed in Epic.    Medications:    pantoprazole  40 mg Intravenous Q24H    heparin  5,000 Units Subcutaneous 2 times per day       Assessment & Plan:           Patient is a 89-year-old female significant past medical history of moderate protein calorie malnutrition, history of SBO in the past requiring ex lap with ASIF, history of SBO status post ex lap with lysis of adhesions in the past in October 2022, polycythemia, hypothyroidism, schizoaffective disorder, hyperlipidemia, aortic atherosclerosis, presented with abdominal distention as well as vomiting M, imaging consistent with SBO     # Closed-loop SBO  -History of SBO in the past, n.p.o., NG tube now clamped  -Lactic acid within normal limits, serial abdominal exams  -Abdominal series reviewed-marked improvement in the bowel obstruction, no more dilated small bowel loops  -General surgery on board, IV fluids, abdominal x-rays-if successful clamping then start clear liquid diet advance as tolerated    # WAYNE  -Resolved  -Creatinine admission was 1.71, baseline is 0.87, likely prerenal from nausea vomiting  -Continue fluids at this time    # Hypothyroidism  -Resume Synthroid when taking p.o.     # Hyperlipidemia  # Carotid artery stenosis aortic atherosclerosis         Hans Marino MD    Supplementary Documentation:     Quality:  DVT Mechanical Prophylaxis:   SCDs,    DVT Pharmacologic Prophylaxis   Medication    heparin (Porcine) 5000 UNIT/ML injection 5,000 Units                Code Status: Full Code  Mi: No urinary catheter in place  Mi Duration (in days):   Central line:    GINA:   The 21st Century Cures Act makes medical notes like these available to patients in the interest of transparency. Please be advised this is a medical document. Medical documents are intended to carry relevant information, facts as evident, and the clinical opinion of the practitioner. The  medical note is intended as peer to peer communication and may appear blunt or direct. It is written in medical language and may contain abbreviations or verbiage that are unfamiliar.

## 2024-07-24 NOTE — PLAN OF CARE
A&Ox2-3. VSS. 2L oxygen via nasal canula. . Denies chest pain and SOB.   Telemetry: NSR.   GI: Abdomen soft, nondistended. No gas present, no belching present - pt poor historian. Hypoactive bowel sounds   Denies nausea.   : Voids.   Pain controlled with PRN pain medications.   Up with standby assist/walker   Drains: NGT LIS   Incisions: None  Diet: Npo - ok for ice-chips   IVF running per order. All appropriate safety measures in place. All questions and concerns addressed.

## 2024-07-24 NOTE — PHYSICAL THERAPY NOTE
PHYSICAL THERAPY EVALUATION - INPATIENT     Room Number: 304/304-A  Evaluation Date: 7/24/2024  Type of Evaluation: Initial  Physician Order: PT Eval and Treat    Presenting Problem: abdominal pain  Co-Morbidities : vertigo, HLD, SBO  Reason for Therapy: Mobility Dysfunction and Discharge Planning    PHYSICAL THERAPY ASSESSMENT   Patient is currently functioning near baseline with bed mobility, transfers, gait, maintaining seated position, and standing prolonged periods.  Prior to admission, patient's baseline is ind and occasionally uses RW.  Patient is requiring contact guard assist as a result of the following impairments: decreased functional strength, decreased endurance/aerobic capacity, decreased muscular endurance, cognitive deficits (confusion), and medical status.  Physical Therapy will continue to follow for duration of hospitalization.    Patient will benefit from continued skilled PT Services with no additional skilled services but increased support at home.    PLAN  PT Treatment Plan: Bed mobility;Body mechanics;Endurance;Energy conservation;Gait training;Transfer training  Rehab Potential : Good  Frequency (Obs):  (2-3x/week)  Number of Visits to Meet Established Goals: 3      CURRENT GOALS    Goal #1 Patient is able to demonstrate supine - sit EOB @ level: supervision     Goal #2 Patient is able to demonstrate transfers Sit to/from Stand at assistance level: supervision     Goal #3 Patient is able to ambulate 250 feet with assist device:  LRAD  at assistance level: supervision     Goal #4    Goal #5    Goal #6    Goal Comments: Goals established on 7/24/2024      PHYSICAL THERAPY MEDICAL/SOCIAL HISTORY  History related to current admission: Patient is a 89 year old female admitted on 7/21/2024 from [facility] for abdominal pain and vomiting.  Pt diagnosed with WAYNE.      HOME SITUATION  Type of Home: Independent living facility ([facility])   Home Layout: One level                 Lives With: Son (with mental disabilities)  Drives: No  Patient Owned Equipment: Rolling walker       Prior Level of [place]: Pt is a poor historian. Pt reports that she lives in ILF with her son with special needs. Per pt daughter, pt has a RW at home that she doesn't use very often and has no history of falls. She is ind with ADLs and her daughter is considering getting her more support at home to help with medications, cleaning, laundry,etc.     SUBJECTIVE  \"I don't remember if I use a RW at home.\"      OBJECTIVE  Precautions: Bed/chair alarm;NG suction  Fall Risk: High fall risk    WEIGHT BEARING RESTRICTION  Weight Bearing Restriction: None                PAIN ASSESSMENT  Rating: Unable to rate  Location: throat/neck  Management Techniques: Activity promotion;Repositioning;Relaxation    COGNITION  Orientation Level:  oriented to place and oriented to person  Memory:  decreased short term memory  Following Commands:  follows one step commands with repetition    RANGE OF MOTION AND STRENGTH ASSESSMENT  Upper extremity ROM and strength are within functional limits     Lower extremity ROM is within functional limits     Lower extremity strength is within functional limits       BALANCE  Static Sitting: Good  Dynamic Sitting: Good  Static Standing: Fair -  Dynamic Standing: Fair -    ADDITIONAL TESTS                                    ACTIVITY TOLERANCE                         O2 WALK       NEUROLOGICAL FINDINGS                        AM-PAC '6-Clicks' INPATIENT SHORT FORM - BASIC MOBILITY  How much difficulty does the patient currently have...  Patient Difficulty: Turning over in bed (including adjusting bedclothes, sheets and blankets)?: A Little   Patient Difficulty: Sitting down on and standing up from a chair with arms (e.g., wheelchair, bedside commode, etc.): A Little   Patient Difficulty: Moving from lying on back to sitting on the side of the bed?: A Little   How much help from another person does  the patient currently need...   Help from Another: Moving to and from a bed to a chair (including a wheelchair)?: A Little   Help from Another: Need to walk in hospital room?: A Little   Help from Another: Climbing 3-5 steps with a railing?: A Little       AM-PAC Score:  Raw Score: 18   Approx Degree of Impairment: 46.58%   Standardized Score (AM-PAC Scale): 43.63   CMS Modifier (G-Code): CK    FUNCTIONAL ABILITY STATUS  Gait Assessment   Functional Mobility/Gait Assessment  Gait Assistance: Contact guard assist  Distance (ft): 5x1, 150x1  Assistive Device: Rolling walker  Pattern: Shuffle    Skilled Therapy Provided     Bed Mobility:  Rolling: NT  Supine to sit: supervision with HOB elevated and used bedrails to pull self to upright posture   Sit to supine: NT     Transfer Mobility:  Sit to stand from bed and toilet: CGA to RW   Stand to sit: supervision to chair and toilet  Gait = see assessment above    Therapist's Comments: Pt receiving resting in bed and was agreeable to PT. Pt A&Ox2 which is her baseline. Pt reports some pain in her throat/neck and reports the NG tube is bothering her. Pt performs bed mobility with supervision with HOB elevated. Pt reports needing to use yhtr bathroom so she amb with CGA to bathroom and performed pericare and hand hygiene ind. Pt required CGA to stand from toilet seat while pushing off grab bars. Pt amb 150 ft in hallway with RW and CGA. Pt demonstrated no LOB but required cuing to keep walker close to body. Pt left in room in chair and was encouraged to amb 3x/day with nursing staff. Pt left with PCT in room and RN aware of session.    Exercise/Education Provided:  Bed mobility  Body mechanics  Energy conservation  Functional activity tolerated  Gait training  Transfer training    Patient End of Session: Up in chair;Needs met;Call light within reach;RN aware of session/findings;All patient questions and concerns addressed;Alarm set;With  staff      Patient Evaluation  Complexity Level:  History Low - no personal factors and/or co-morbidities   Examination of body systems Low - addressing 1-2 elements   Clinical Presentation Low - Stable   Clinical Decision Making Low - Stable       PT Session Time: 20 minutes    Therapeutic Activity: 15 minutes

## 2024-07-24 NOTE — PROGRESS NOTES
[facility]  Progress Note    Catherine Bustamante Patient Status:  Inpatient    1935 MRN [id number]   Location [facility] 3NW-A Attending Hans Marino MD   Hosp Day # 3 PCP Flynn Polk MD     Subjective:    Patient with confusion  Failed NG tube clamping     Objective/Physical Exam:    Vital Signs:  Blood pressure 94/77, pulse 68, temperature 98.8 °F (37.1 °C), temperature source Oral, resp. rate 17, height 5' 3\" (1.6 m), weight 100 lb 14.4 oz (45.8 kg), SpO2 98%, not currently breastfeeding.    General:  Alert, orientated x3.  Cooperative.  No apparent distress.   Abdomen:  soft, non tender, non distended     Problem List:  Patient Active Problem List   Diagnosis    Hyperlipidemia, [mixed] / Rx: Atorvastain / LDL Goal < 160 mg/dl - S. [place]    Elevated Glucose / HbA1C < 6.0%    Polycythemia - PAOLA 2 mutation negative [HCC]    Vitamin D deficiency Rx vitamin d 3 [cholecalciferol]    Screening mammogram for breast cancer    Hypothyroidism    Benign paroxysmal positional vertigo due to bilateral vestibular disorder    Anxiety disorder    Asymptomatic carotid artery stenosis, bilateral    Chronic fatigue and malaise    LBBB (left bundle branch block)    Pure hypercholesterolemia    Vertigo    Schizoaffective disorder (HCC)    PVC's (premature ventricular contractions)    Anal polyp    Hyponatremia    Acute kidney injury (HCC)    Azotemia    Hyperglycemia    SBO (small bowel obstruction) (HCC)    Chest pain of uncertain etiology    WAYNE (acute kidney injury) (HCC)    Nausea and vomiting, unspecified vomiting type       Impression:     89 y/o with SBO  Failed clamp trial x 2  Ng output overnight 650cc    Plan:    Will obtain obs series today  Encourage ambulation/IS  NPO ice chips  Consider trial clamp today  All questions answered  Further orders pending imaging  DW LISA Morales  [facility]  2024

## 2024-07-24 NOTE — CM/SW NOTE
Sw spoke to daughter about HHC at [facility]. She has list and will review for choice.  She plans to have more care set up for pt through [facility] also.    Eufemia Yan, TREVORW  /Discharge Planner

## 2024-07-24 NOTE — PROGRESS NOTES
[facility]   part of [facility]     Hospitalist Progress Note     Catherine Bustamante Patient Status:  Inpatient    1935 MRN [id number]   Location [facility] 3NW-A Attending Hans Marino MD   Hosp Day # 3 PCP Flynn Polk MD     Chief Complaint: see HPI    Subjective:     Patient seen and examined.   Denies abd pain.  NGT to LIS.  Denies CP/SOB.  NAD.     Objective:    Review of Systems:   A comprehensive review of systems was completed; pertinent positive and negatives stated in subjective.    Vital signs:  Temp:  [97.9 °F (36.6 °C)-98.8 °F (37.1 °C)] 98.8 °F (37.1 °C)  Pulse:  [68-93] 68  Resp:  [14-18] 17  BP: ()/(52-77) 94/77  SpO2:  [93 %-100 %] 98 %    Physical Exam:        Gen: No acute distress, alert and oriented x 3  Pulm: Lungs clear bilaterally, good inspiratory effort   CV:  nL S1/S2  Abd: Slightly distended hypoactive bowel sounds  MSK: moving all extremities, no edema  Neuro: no focal deficits  Skin: no rashes/lesions  Psych: normal mood/affect    Diagnostic Data:    Labs:  Recent Labs   Lab 24  1510 24  0520 24  0907   WBC 13.1* 9.4 6.1   HGB 17.9* 15.6 13.9   MCV 85.6 85.3 87.3   .0 238.0 181.0       Recent Labs   Lab 24  1510 24  0520   * 121*   BUN 32* 37*   CREATSERUM 1.71* 1.15*   CA 10.9* 10.1   ALB 5.2*  --     137   K 4.9 4.1   CL 99 100   CO2 28.0 29.0   ALKPHO 74  --    AST 16  --    ALT 17  --    BILT 1.1  --    TP 8.3*  --        Estimated Creatinine Clearance: 24 mL/min (A) (based on SCr of 1.15 mg/dL (H)).    No results for input(s): \"TROP\", \"TROPHS\", \"CK\" in the last 168 hours.    No results for input(s): \"PTP\", \"INR\" in the last 168 hours.               Microbiology    Hospital Encounter on 24   1. Urine Culture, Routine     Status: None    Collection Time: 24  3:46 PM    Specimen: Urine, clean catch   Result Value Ref Range    Urine Culture No Growth at 18-24 hrs. N/A         Imaging: Reviewed  in Epic.    Medications:    pantoprazole  40 mg Intravenous Q24H    heparin  5,000 Units Subcutaneous 2 times per day       Assessment & Plan:           Patient is a 89-year-old female significant past medical history of moderate protein calorie malnutrition, history of SBO in the past requiring ex lap with ASIF, history of SBO status post ex lap with lysis of adhesions in the past in October 2022, polycythemia, hypothyroidism, schizoaffective disorder, hyperlipidemia, aortic atherosclerosis, presented with abdominal distention as well as vomiting M, imaging consistent with SBO     # Closed-loop SBO  -History of SBO in the past, n.p.o., NG tube now clamped  -Lactic acid within normal limits, serial abdominal exams  -Abdominal series reviewed-marked improvement in the bowel obstruction, no more dilated small bowel loops  -General surgery on board, IV fluids, abdominal x-rays-if successful clamping then start clear liquid diet advance as tolerated per surg     # WAYNE  -Resolved  -Creatinine admission was 1.71, baseline is 0.87, likely prerenal from nausea vomiting  -Continue fluids at this time, decrease to maintenance dosing     # Hypothyroidism  -Resume Synthroid when taking p.o.     # Hyperlipidemia  # Carotid artery stenosis aortic atherosclerosis       Isabelle Sharma MD    Supplementary Documentation:     Quality:  DVT Mechanical Prophylaxis:   SCDs,    DVT Pharmacologic Prophylaxis   Medication    heparin (Porcine) 5000 UNIT/ML injection 5,000 Units                Code Status: Full Code  Mi: No urinary catheter in place  Mi Duration (in days):   Central line:    GINA:   The 21st Century Cures Act makes medical notes like these available to patients in the interest of transparency. Please be advised this is a medical document. Medical documents are intended to carry relevant information, facts as evident, and the clinical opinion of the practitioner. The medical note is intended as peer to peer communication  and may appear blunt or direct. It is written in medical language and may contain abbreviations or verbiage that are unfamiliar.

## 2024-07-25 LAB
ANION GAP SERPL CALC-SCNC: 9 MMOL/L (ref 0–18)
BASOPHILS # BLD AUTO: 0.02 X10(3) UL (ref 0–0.2)
BASOPHILS NFR BLD AUTO: 0.3 %
BUN BLD-MCNC: 13 MG/DL (ref 9–23)
CALCIUM BLD-MCNC: 9.4 MG/DL (ref 8.7–10.4)
CHLORIDE SERPL-SCNC: 104 MMOL/L (ref 98–112)
CO2 SERPL-SCNC: 27 MMOL/L (ref 21–32)
CREAT BLD-MCNC: 0.57 MG/DL
EGFRCR SERPLBLD CKD-EPI 2021: 87 ML/MIN/1.73M2 (ref 60–?)
EOSINOPHIL # BLD AUTO: 0.03 X10(3) UL (ref 0–0.7)
EOSINOPHIL NFR BLD AUTO: 0.4 %
ERYTHROCYTE [DISTWIDTH] IN BLOOD BY AUTOMATED COUNT: 13.4 %
GLUCOSE BLD-MCNC: 90 MG/DL (ref 70–99)
HCT VFR BLD AUTO: 38.2 %
HGB BLD-MCNC: 13 G/DL
IMM GRANULOCYTES # BLD AUTO: 0.02 X10(3) UL (ref 0–1)
IMM GRANULOCYTES NFR BLD: 0.3 %
LYMPHOCYTES # BLD AUTO: 0.45 X10(3) UL (ref 1–4)
LYMPHOCYTES NFR BLD AUTO: 6.1 %
MCH RBC QN AUTO: 28.6 PG (ref 26–34)
MCHC RBC AUTO-ENTMCNC: 34 G/DL (ref 31–37)
MCV RBC AUTO: 84.1 FL
MONOCYTES # BLD AUTO: 0.7 X10(3) UL (ref 0.1–1)
MONOCYTES NFR BLD AUTO: 9.5 %
NEUTROPHILS # BLD AUTO: 6.11 X10 (3) UL (ref 1.5–7.7)
NEUTROPHILS # BLD AUTO: 6.11 X10(3) UL (ref 1.5–7.7)
NEUTROPHILS NFR BLD AUTO: 83.4 %
OSMOLALITY SERPL CALC.SUM OF ELEC: 290 MOSM/KG (ref 275–295)
PLATELET # BLD AUTO: 197 10(3)UL (ref 150–450)
POTASSIUM SERPL-SCNC: 3.3 MMOL/L (ref 3.5–5.1)
RBC # BLD AUTO: 4.54 X10(6)UL
SODIUM SERPL-SCNC: 140 MMOL/L (ref 136–145)
WBC # BLD AUTO: 7.3 X10(3) UL (ref 4–11)

## 2024-07-25 PROCEDURE — 85025 COMPLETE CBC W/AUTO DIFF WBC: CPT | Performed by: HOSPITALIST

## 2024-07-25 PROCEDURE — 80048 BASIC METABOLIC PNL TOTAL CA: CPT | Performed by: HOSPITALIST

## 2024-07-25 RX ORDER — SODIUM CHLORIDE 9 MG/ML
INJECTION, SOLUTION INTRAVENOUS CONTINUOUS
Status: DISCONTINUED | OUTPATIENT
Start: 2024-07-25 | End: 2024-07-26

## 2024-07-25 NOTE — PLAN OF CARE
Received pt at 1930. Pt is A&O x 3; follows commands, but had periodic moments of confusion and needs reorientation. Pt is on RA, VSS, tolerating clear liquid diet after NG tube removal earlier today. Pt is continent of bowel and bladder, ambulated to bathroom at HS with large bm noted. Pt denies pain and ambulates with 1 assist and walker. IV access is patent infusing 0.9 ns at 100 ml/hr. Shift assessment performed, HS meds given. NOC safety plan in place; bed in low position, call light and personal items within reach, pt encouraged to call staff for assistance.    POC:  Possible discharge tomorrow

## 2024-07-25 NOTE — PROGRESS NOTES
[facility]   part of [facility]     Hospitalist Progress Note     Catherine Bustamante Patient Status:  Inpatient    1935 MRN [id number]   Location [facility] 3NW-A Attending Hans Marino MD   Hosp Day # 4 PCP Flynn Polk MD     Chief Complaint: see HPI    Subjective:     Patient seen and examined.   Denies abd pain.  NGT removed.  Denies CP/SOB.  NAD.     Objective:    Review of Systems:   A comprehensive review of systems was completed; pertinent positive and negatives stated in subjective.    Vital signs:  Temp:  [98 °F (36.7 °C)-99.7 °F (37.6 °C)] 98.1 °F (36.7 °C)  Pulse:  [67-78] 78  Resp:  [13-18] 17  BP: (111-132)/(50-67) 132/66  SpO2:  [92 %-98 %] 97 %    Physical Exam:        Gen: No acute distress, alert and oriented x 3  Pulm: Lungs clear bilaterally, good inspiratory effort   CV:  nL S1/S2  Abd: Slightly distended hypoactive bowel sounds  MSK: moving all extremities, no edema  Neuro: no focal deficits  Skin: no rashes/lesions  Psych: normal mood/affect    Diagnostic Data:    Labs:  Recent Labs   Lab 24  1510 24  0520 24  0907 24  0527   WBC 13.1* 9.4 6.1 7.3   HGB 17.9* 15.6 13.9 13.0   MCV 85.6 85.3 87.3 84.1   .0 238.0 181.0 197.0       Recent Labs   Lab 24  1510 24  0520 24  0527   * 121* 90   BUN 32* 37* 13   CREATSERUM 1.71* 1.15* 0.57   CA 10.9* 10.1 9.4   ALB 5.2*  --   --     137 140   K 4.9 4.1 3.3*   CL 99 100 104   CO2 28.0 29.0 27.0   ALKPHO 74  --   --    AST 16  --   --    ALT 17  --   --    BILT 1.1  --   --    TP 8.3*  --   --        Estimated Creatinine Clearance: 48.4 mL/min (based on SCr of 0.57 mg/dL).    No results for input(s): \"TROP\", \"TROPHS\", \"CK\" in the last 168 hours.    No results for input(s): \"PTP\", \"INR\" in the last 168 hours.               Microbiology    Hospital Encounter on 24   1. Urine Culture, Routine     Status: None    Collection Time: 24  3:46 PM    Specimen: Urine,  clean catch   Result Value Ref Range    Urine Culture No Growth at 18-24 hrs. N/A         Imaging: Reviewed in Epic.    Medications:    pantoprazole  40 mg Intravenous Q24H    heparin  5,000 Units Subcutaneous 2 times per day       Assessment & Plan:           Patient is a 89-year-old female significant past medical history of moderate protein calorie malnutrition, history of SBO in the past requiring ex lap with ASIF, history of SBO status post ex lap with lysis of adhesions in the past in October 2022, polycythemia, hypothyroidism, schizoaffective disorder, hyperlipidemia, aortic atherosclerosis, presented with abdominal distention as well as vomiting M, imaging consistent with SBO     # Closed-loop SBO  -History of SBO in the past, n.p.o., NG tube now clamped  -Lactic acid within normal limits, serial abdominal exams  -Abdominal series reviewed-marked improvement in the bowel obstruction, no more dilated small bowel loops  -General surgery on board, IV fluids, abdominal x-rays-if successful clamping then start clear liquid diet advance as tolerated per surg - NGT out, tolerating clears, ADAT to full per surgery    # WAYNE  -Resolved  -Creatinine admission was 1.71, baseline is 0.87, likely prerenal from nausea vomiting  -Continue fluids at this time, decrease to maintenance dosing - dc fluids today     # Hypothyroidism  -Resume Synthroid when taking p.o.     # Hyperlipidemia  # Carotid artery stenosis aortic atherosclerosis       Isabelle Sharma MD    Supplementary Documentation:     Quality:  DVT Mechanical Prophylaxis:   SCDs,    DVT Pharmacologic Prophylaxis   Medication    heparin (Porcine) 5000 UNIT/ML injection 5,000 Units                Code Status: Full Code  Mi: No urinary catheter in place  Mi Duration (in days):   Central line:    GINA: 7/26/2024  The 21st Century Cures Act makes medical notes like these available to patients in the interest of transparency. Please be advised this is a medical  document. Medical documents are intended to carry relevant information, facts as evident, and the clinical opinion of the practitioner. The medical note is intended as peer to peer communication and may appear blunt or direct. It is written in medical language and may contain abbreviations or verbiage that are unfamiliar.

## 2024-07-25 NOTE — CM/SW NOTE
Daughter called re: HHC choice but realized she was picking HHC off of a private duty list she had been given.  Explained that I would email her the correct HHC list- sent to her email [email or web address].  She will review for choice.    Eufemia Yan LCSW  /Discharge Planner

## 2024-07-25 NOTE — CM/SW NOTE
Sw met with pt to discuss dc plans for possible dc home tomorrow.  She is looking forward to going. home but is concerned that she is weaker. Explained that her family will be contacting [facility] to possibly get extra assistance once dc. Also explained plan for [facility].    Medicare IM given to pt.    Eufemia Yan LCSW  /Discharge Planner

## 2024-07-25 NOTE — DIETARY NOTE
[facility]   part of [facility]    NUTRITION ASSESSMENT    Unable to diagnose malnutrition criteria at this time.      NUTRITION INTERVENTION:      Meal and Snacks - Monitor and encourage adequate PO intake.   Medical Food Supplements - Ensure Plus High Protein Daily and Ensure Clear Daily. Rationale/use for oral supplements discussed.  Coordination of Nutrition Care - GI/Surgery consult for nutrition support/diet advancement       PATIENT STATUS: Diet advancing to clears per PA. NG out. Added ONS. No new wt. On RA  07/22/24 89 year old female admitted with abdominal, n/v. Chart reviewed for Low BMI  CT scan obtained revealing SBO. Pt with NG. Currently sleeping, no family at bedside. Unsure of PO and wt hx. Mod to mild wasting noted. Per surgery, conservative management at this time. RD to follow for diet advancement    ANTHROPOMETRICS:  Ht: 160 cm (5' 3\")  Wt: 45.8 kg (100 lb 14.4 oz).   BMI: Body mass index is 17.87 kg/m².  IBW: 52.3 kg      WEIGHT HISTORY:   Weight loss: No    Wt Readings from Last 10 Encounters:   07/21/24 45.8 kg (100 lb 14.4 oz)   10/28/23 45.4 kg (100 lb)   10/28/23 59.9 kg (132 lb)   10/09/22 54.4 kg (120 lb)   12/06/21 57.8 kg (127 lb 6.4 oz)   10/26/21 58.6 kg (129 lb 3.2 oz)   08/19/21 58.1 kg (128 lb)   08/05/21 58.5 kg (129 lb)   06/04/21 58.5 kg (129 lb)   12/04/20 59.6 kg (131 lb 6.4 oz)        NUTRITION:  Diet:       Procedures    Clear liquid diet Is Patient on Accuchecks? No    Full liquid diet Is Patient on Accuchecks? No      Food Allergies: No  Cultural/Ethnic/Judaism Preferences Addressed: Yes    Percent Meals Eaten (last 3 days)       Date/Time Percent Meals Eaten (%)    07/22/24 0900 0 %     Percent Meals Eaten (%): NPO at 07/22/24 0900    07/22/24 1300 0 %     Percent Meals Eaten (%): NPO at 07/22/24 1300    07/23/24 0512 0 %    07/23/24 2119 0 %    07/23/24 2313 0 %            GI system review: WNL Last BM: 7/25  Skin and wounds: intact    NUTRITION RELATED  PHYSICAL FINDINGS:     1. Body Fat/Muscle Mass: moderate depletion body fat Midaxillary line and mild muscle depletion Temple region, Clavicle region, and Dorsal hand region     2. Fluid Accumulation: none per RN documentation     NUTRITION PRESCRIPTION: 45.8kg  Calories: 8802-8511 calories/day (30-35 kcal/kg)  Protein: 67-90 grams protein/day (1.5-2.0 grams protein per kg)  Fluid: ~1 ml/kcal or per MD discretion    NUTRITION DIAGNOSIS/PROBLEM:  Inadequate oral intake related to altered GI function/GI disorder as evidenced by need for NPO status       MONITOR AND EVALUATE/NUTRITION GOALS:  PO intake of 75% of meals TID - New  PO intake of 75% of oral nutrition supplement/s - New  Weight stable within 1 to 2 lbs during admission - Ongoing  Return to PO intake or advance diet in 24-48 hrs - Resolved      MEDICATIONS:  LR @ 50mL, protonix, Kcl 40mEq    LABS:  K+ 3.3    Pt is at High nutrition risk    Ambika Tai RD, LDN  Clinical Nutrition  [id number]

## 2024-07-25 NOTE — PROGRESS NOTES
[facility]  Progress Note    Catherine Bustamante Patient Status:  Inpatient    1935 MRN [id number]   Location [facility] 3NW-A Attending Hans Marino MD   Hosp Day # 4 PCP Flynn Polk MD     Subjective:    Patient tolerated ng removal and started on clears  She denies any nausea, she is tolerating clear liquids    Objective/Physical Exam:    Vital Signs:  Blood pressure 121/53, pulse 73, temperature 98.1 °F (36.7 °C), temperature source Oral, resp. rate 13, height 5' 3\" (1.6 m), weight 100 lb 14.4 oz (45.8 kg), SpO2 95%, not currently breastfeeding.    General:  Alert, Cooperative.  No apparent distress.    Lungs:  Non labored breathing    Cardiac:  Regular rate and rhythm.     Abdomen:  soft, non distended, non tender    Extremities:  No lower extremity edema noted.  Without clubbing or cyanosis.        Labs:  Reviewed    Lab Results   Component Value Date    WBC 7.3 2024    HGB 13.0 2024    HCT 38.2 2024    .0 2024    CREATSERUM 0.57 2024    BUN 13 2024     2024    K 3.3 2024     2024    CO2 27.0 2024    GLU 90 2024    CA 9.4 2024     Problem List:  Patient Active Problem List   Diagnosis    Hyperlipidemia, [mixed] / Rx: Atorvastain / LDL Goal < 160 mg/dl - [facility]    Elevated Glucose / HbA1C < 6.0%    Polycythemia - PAOLA 2 mutation negative [HCC]    Vitamin D deficiency Rx vitamin d 3 [cholecalciferol]    Screening mammogram for breast cancer    Hypothyroidism    Benign paroxysmal positional vertigo due to bilateral vestibular disorder    Anxiety disorder    Asymptomatic carotid artery stenosis, bilateral    Chronic fatigue and malaise    LBBB (left bundle branch block)    Pure hypercholesterolemia    Vertigo    Schizoaffective disorder (HCC)    PVC's (premature ventricular contractions)    Anal polyp    Hyponatremia    Acute kidney injury (HCC)    Azotemia    Hyperglycemia    SBO (small bowel  obstruction) (HCC)    Chest pain of uncertain etiology    WAYNE (acute kidney injury) (HCC)    Nausea and vomiting, unspecified vomiting type       Impression:     89 y/o with SBO  -resolving, ng out, tolerating clears    Plan:    Reviewed and encourage up in chair/ambulate  Clear liquids this morning, can advance to full liquids this afternoon  Would recommend low fiber tomorrow and possible dc tomorrow  All questions answered  Dc planning     LISA Cardenas  [facility]  General Surgery  7/25/2024

## 2024-07-25 NOTE — CM/SW NOTE
Sw spoke to daughter again re: need for HHC choice- she has asked her brother to review list.  Explained that pt may be ready to dc tomorrow.  Asked if she had set up any extra services through [facility]-she has not done so yet but plans on reaching out to them to arrange for more care.    She will call Sw once she has HHC choice.    Eufemia Yan, TREVORW  /Discharge Planner

## 2024-07-25 NOTE — PAYOR COMM NOTE
--------------  7/23 7/24:  CONTINUED STAY REVIEW    Payor: [employer]  Subscriber #:  [id number]  Authorization Number: [id number]    Admit date: 7/21/24  Admit time:  9:50 PM    7/23 SURGERY:    Subjective:     Patient confused, she denies any pain  Obs series reviewed, non obstructive gas pattern, no dilated loops of small bowel     Impression:      88 y/o with SBO  -NG output 2000cc charted/24hr  -obs series with resolution of dilated loops of small bowel     Plan:  NPO ice chips  Will clamp NG tube x 6 hrs if pain, n/v reconnect to LIS, check residual after 6 hours if less than 150cc can remove and start clears.  Encourage ambulation  BETTYE Kearns PA  [facility] Surgery  7/23/2024 7/24  SURGERY:    Impression:      88 y/o with SBO  Failed clamp trial x 2  Ng output overnight 650cc     Plan:     Will obtain obs series today  Encourage ambulation/IS  NPO ice chips  Consider trial clamp today  All questions answered  Further orders pending imaging  DW LISA Morales  [facility] Surgery  7/24/2024       MEDICATIONS ADMINISTERED IN LAST 1 DAY:  heparin (Porcine) 5000 UNIT/ML injection 5,000 Units       Date Action Dose Route User    7/25/2024 0843 Given 5,000 Units Subcutaneous (Left Lower Abdomen) Luh Pettit RN    7/24/2024 2033 Given 5,000 Units Subcutaneous (Left Lower Abdomen) Gina Forte RN          lactated ringers infusion       Date Action Dose Route User    7/24/2024 1219 Rate/Dose Change (none) Intravenous Pipe Majano RN          pantoprazole (Protonix) 40 mg in sodium chloride 0.9% PF 10 mL IV push       Date Action Dose Route User    7/25/2024 0843 Given 40 mg Intravenous Luh Pettit RN          potassium chloride 40 mEq in 250mL sodium chloride 0.9% IVPB premix       Date Action Dose Route User    7/25/2024 0843 New Bag 40 mEq Intravenous Luh Pettit RN            Vitals (last  day)       Date/Time Temp Pulse Resp BP SpO2 Weight O2 Device O2 Flow Rate (L/min) [facility]    07/25/24 0814 98.1 °F (36.7 °C) 73 13 121/53 95 % -- None (Room air) --     07/25/24 0530 98.6 °F (37 °C) 69 18 111/50 92 % -- None (Room air) --     07/25/24 0155 98.7 °F (37.1 °C) 74 18 125/64 94 % -- None (Room air) --     07/24/24 2040 98.3 °F (36.8 °C) -- -- 132/67 97 % -- None (Room air) --     07/24/24 1705 99.7 °F (37.6 °C) 67 15 111/54 98 % -- None (Room air) --     07/24/24 1352 98 °F (36.7 °C) 69 16 117/51 97 % -- None (Room air) --     07/24/24 0801 98.8 °F (37.1 °C) 68 17 94/77 98 % -- Nasal cannula 2 L/min     07/24/24 0426 98 °F (36.7 °C) 68 16 100/52 97 % -- Nasal cannula 2 L/min DI

## 2024-07-25 NOTE — PROGRESS NOTES
A&Ox2. Confused and forgetful. VSS. RA. .  Telemetry: NSR  GI: Abdomen soft, distended. Passing gas and stool  Nausea controlled with prn zofran  : Voids, incontinent   Pain controlled with PRN pain medications  Up with standby assist and walker  Diet: NPO with ice chips  IVF running per order.  All appropriate safety measures in place. Patient updated on plan of care. All questions and concerns addressed.

## 2024-07-26 ENCOUNTER — APPOINTMENT (OUTPATIENT)
Dept: GENERAL RADIOLOGY | Facility: HOSPITAL | Age: 89
End: 2024-07-26
Attending: SURGERY
Payer: MEDICARE

## 2024-07-26 ENCOUNTER — APPOINTMENT (OUTPATIENT)
Dept: GENERAL RADIOLOGY | Facility: HOSPITAL | Age: 89
End: 2024-07-26
Attending: PHYSICIAN ASSISTANT
Payer: MEDICARE

## 2024-07-26 LAB — POTASSIUM SERPL-SCNC: 3.7 MMOL/L (ref 3.5–5.1)

## 2024-07-26 PROCEDURE — 97530 THERAPEUTIC ACTIVITIES: CPT

## 2024-07-26 PROCEDURE — 74250 X-RAY XM SM INT 1CNTRST STD: CPT | Performed by: PHYSICIAN ASSISTANT

## 2024-07-26 PROCEDURE — 84132 ASSAY OF SERUM POTASSIUM: CPT | Performed by: HOSPITALIST

## 2024-07-26 PROCEDURE — 97116 GAIT TRAINING THERAPY: CPT

## 2024-07-26 PROCEDURE — 74021 RADEX ABDOMEN 3+ VIEWS: CPT | Performed by: SURGERY

## 2024-07-26 RX ORDER — POTASSIUM CHLORIDE 14.9 MG/ML
20 INJECTION INTRAVENOUS ONCE
Status: COMPLETED | OUTPATIENT
Start: 2024-07-26 | End: 2024-07-26

## 2024-07-26 RX ORDER — SODIUM CHLORIDE, SODIUM LACTATE, POTASSIUM CHLORIDE, CALCIUM CHLORIDE 600; 310; 30; 20 MG/100ML; MG/100ML; MG/100ML; MG/100ML
INJECTION, SOLUTION INTRAVENOUS CONTINUOUS
Status: DISCONTINUED | OUTPATIENT
Start: 2024-07-26 | End: 2024-08-03

## 2024-07-26 NOTE — PHYSICAL THERAPY NOTE
PHYSICAL THERAPY TREATMENT NOTE - INPATIENT    Room Number: 304/304-A     Session: 1     Number of Visits to Meet Established Goals: 3    Presenting Problem: SBO  Co-Morbidities : vertigo, HLD, SBO    ASSESSMENT   Patient demonstrates good  progress this session, goals updated to reflect patient performance.    Patient continues to function near baseline with bed mobility, transfers, and gait. Contributing factors to remaining limitations include impaired dynamic balance, cognitive deficits (memory impairments), and medical status.  Next session anticipate patient to progress gait.  Physical Therapy will continue to follow patient for duration of hospitalization.    Patient continues to benefit from continued skilled PT services: with no additional skilled services but increased support at home.    PLAN  PT Treatment Plan: Bed mobility;Body mechanics;Endurance;Energy conservation;Gait training;Transfer training  Rehab Potential : Good  Frequency (Obs):  (2-3x/week)    CURRENT GOALS     Goal #1 Patient is able to demonstrate supine - sit EOB @ level: supervision   MET   Goal #2 Patient is able to demonstrate transfers Sit to/from Stand at assistance level: supervision- MET      Goal #3 Patient is able to ambulate 250 feet with assist device:  LRAD  at assistance level: supervision      Goal #4     Goal #5     Goal #6     Goal Comments: Goals established on 2024 goals updated    SUBJECTIVE  \"Thank you for walking with me.\"     OBJECTIVE  Precautions: Bed/chair alarm;NG suction    WEIGHT BEARING RESTRICTION  Weight Bearing Restriction: None                PAIN ASSESSMENT   Ratin  Location: throat/neck  Management Techniques: Activity promotion;Repositioning;Relaxation    BALANCE                                                                                                                       Static Sitting: Good  Dynamic Sitting: Good           Static Standing: Fair -  Dynamic Standing: Poor  +    ACTIVITY TOLERANCE                         O2 WALK         AM-PAC '6-Clicks' INPATIENT SHORT FORM - BASIC MOBILITY  How much difficulty does the patient currently have...  Patient Difficulty: Turning over in bed (including adjusting bedclothes, sheets and blankets)?: A Little   Patient Difficulty: Sitting down on and standing up from a chair with arms (e.g., wheelchair, bedside commode, etc.): A Little   Patient Difficulty: Moving from lying on back to sitting on the side of the bed?: A Little   How much help from another person does the patient currently need...   Help from Another: Moving to and from a bed to a chair (including a wheelchair)?: A Little   Help from Another: Need to walk in hospital room?: A Little   Help from Another: Climbing 3-5 steps with a railing?: A Little       AM-PAC Score:  Raw Score: 18   Approx Degree of Impairment: 46.58%   Standardized Score (AM-PAC Scale): 43.63   CMS Modifier (G-Code): CK    FUNCTIONAL ABILITY STATUS  Gait Assessment   Functional Mobility/Gait Assessment  Gait Assistance: Contact guard assist  Distance (ft): 350  Assistive Device: Rolling walker  Pattern: Shuffle    Skilled Therapy Provided  Supervision for bed mobility.   VC for transfer set up.   Supervision for sit-stand with RW.   Ambulatory with RW and CGA for safety.   Ambulates with slow bruce and flat foot initial contact.   VC for sequencing with RW and posture.   No LOB.   Pt tolerated well and denies pain.   Returned to room supine in bed with supervision.     Bed Mobility:  Rolling: supervision   Supine<>Sit: supervision   Sit<>Supine: supervision     Transfer Mobility:  Sit<>Stand: supervision   Stand<>Sit: supervision   Gait: CGA    Therapist's Comments: Recommend RW for ambulation. Reviewed activity recommendations.   Pt continues to demonstrate confusion. Unable to recall recent events or where she lives.       THERAPEUTIC EXERCISES  Lower Extremity Alternating marching  Ankle pumps  LAQ      Upper Extremity      Position Sitting     Repetitions   10   Sets   1     Patient End of Session: In bed;Call light within reach;RN aware of session/findings;All patient questions and concerns addressed;Alarm set    PT Session Time: 25 minutes  Gait Training: 10 minutes  Therapeutic Activity: 10 minutes  Therapeutic Exercise: 5 minutes   Neuromuscular Re-education:  minutes

## 2024-07-26 NOTE — PROGRESS NOTES
[facility]  Progress Note    Catherine Bustamante Patient Status:  Inpatient    1935 MRN [id number]   Location [facility] 3NW-A Attending Hans Marino MD   Hosp Day # 5 PCP Flynn Polk MD     Subjective:    Overnight notes reviewed, emesis, NG tube inserted charted 2700cc output   Patient confused this morning  Obs series today pending     Objective/Physical Exam:    Vital Signs:  Blood pressure 119/58, pulse 82, temperature 98.3 °F (36.8 °C), temperature source Oral, resp. rate 13, height 5' 3\" (1.6 m), weight 100 lb 14.4 oz (45.8 kg), SpO2 93%, not currently breastfeeding.    General:  Alert, Cooperative.  No apparent distress.    Lungs:  Non labored breathing    Cardiac:  Regular rate and rhythm.     Abdomen:  soft, non tender, minimal distension     Extremities:  No lower extremity edema noted.  Without clubbing or cyanosis.        Labs:  Reviewed    Lab Results   Component Value Date    K 3.7 2024       Problem List:  Patient Active Problem List   Diagnosis    Hyperlipidemia, [mixed] / Rx: Atorvastain / LDL Goal < 160 mg/dl - [facility]    Elevated Glucose / HbA1C < 6.0%    Polycythemia - PAOLA 2 mutation negative [HCC]    Vitamin D deficiency Rx vitamin d 3 [cholecalciferol]    Screening mammogram for breast cancer    Hypothyroidism    Benign paroxysmal positional vertigo due to bilateral vestibular disorder    Anxiety disorder    Asymptomatic carotid artery stenosis, bilateral    Chronic fatigue and malaise    LBBB (left bundle branch block)    Pure hypercholesterolemia    Vertigo    Schizoaffective disorder (HCC)    PVC's (premature ventricular contractions)    Anal polyp    Hyponatremia    Acute kidney injury (HCC)    Azotemia    Hyperglycemia    SBO (small bowel obstruction) (HCC)    Chest pain of uncertain etiology    WAYNE (acute kidney injury) (HCC)    Nausea and vomiting, unspecified vomiting type       Impression:     89 y/o with SBO  -NG had been out and tolerating liquids for 24  hrs, overnight emesis, NG inserted with large amount of output    Plan:    Will obtain SBFT today  IV fluids  NG to LIS  NPO ice chips ok  Further orders pending imaging  All questions answered    LISA Cardenas  [facility] Surgery  7/26/2024    Addendum  PROCEDURE:  XR SMALL BOWEL SINGLE CONTRAST (CPT=74250)     TECHNIQUE:  Small bowel series with multiple serial films was performed in the usual manner.  One bottle of contrast administered through patient's NG tube.  Second bottle not administered due to patient discomfort.     COMPARISON:  [facility] , CT, CT ABDOMEN+PELVIS(CPT=74176), 7/21/2024, 4:56 PM.  [facility] , XR, XR ABDOMEN, OBSTRUCTIVE SERIES 3 VIEWS(CPT=74021), 7/26/2024, 8:23 AM.     INDICATIONS:  sbo     PATIENT STATED HISTORY: (As transcribed by Technologist)  Per triage notes, patient came to hospital a few days ago with abdomen pain and vomiting. Per daughter, she has had bowel obstructions before. Patient states she keeps feeling pain in her lower  abdomen.      FLUOROSCOPY IMAGES OBTAINED:  4  FLUOROSCOPY TIME:  24 seconds  RADIATION DOSE (AIR KERMA PRODUCT):  79.2uGy/m2     FINDINGS:    OTHER:  After the administration of contrast through patient's NG tube, contrast is present within normal caliber proximal small bowel duodenal loops.  There are dilated small bowel loops within the lower abdomen and pelvis which persist.  Minimal amount   of contrast progresses within more distal dilated small bowel loops on subsequent imaging.  The stomach remains distended with contrast.  Spot images within dilated small bowel loops are unremarkable.  Transition point is not as well appreciated as on  recent CT performed 7/21/2024.  Patient returned to the floor and nurse removed gastric contrast.  Dilated small bowel loops persist.                     Impression   CONCLUSION:    1. Imaging to 200 minutes after the administration of contrast through patient's NG tube demonstrates persistent  dilated small bowel loops within the lower abdomen and pelvis.  Findings concerning for a small bowel obstruction.        Result of the SBFT study discussed with daughterMaryan  Her brother Brandon is POA.  Since she is not having any pain will repeat abdominal xray in am.  If the contrast passes into the colon then likely will not proceed with surgery  However if the contrast does not move along into colon then will need to explore surgically  Will make the decision in am after reviewing the KUB  Voiced understanding

## 2024-07-26 NOTE — PROGRESS NOTES
A&Ox2-3. forgetful VSS. RA. .  Telemetry: NSR  GI: Abdomen soft, nondistended.  Nausea controlled with prn zofran  : Voids.  Pain controlled with PRN pain medications  Up with standby assist and walker  Drains: NGT to lis in R nare at 56 cm  Diet: NPO  IVF running per order.  All appropriate safety measures in place. Patient updated on plan of care. All questions and concerns addressed.    Small bowel follow through done today, awaiting further plan of care

## 2024-07-26 NOTE — PROGRESS NOTES
[facility]   part of [facility]     Hospitalist Progress Note     Catherine Bustamante Patient Status:  Inpatient    1935 MRN [id number]   Location [facility] 3NW-A Attending Hans Marino MD   Hosp Day # 5 PCP Flynn Polk MD     Chief Complaint: see HPI    Subjective:     Patient seen and examined.   Emesis last night.   NGT re-inserted last night with 2700cc output.   Denies abd pain currently.  Denies CP/SOB.  NAD.     Objective:    Review of Systems:   A comprehensive review of systems was completed; pertinent positive and negatives stated in subjective.    Vital signs:  Temp:  [98.1 °F (36.7 °C)-98.5 °F (36.9 °C)] 98.3 °F (36.8 °C)  Pulse:  [70-91] 82  Resp:  [13-18] 13  BP: (111-134)/(58-64) 119/58  SpO2:  [93 %-96 %] 93 %    Physical Exam:        Gen: No acute distress, alert and oriented x 3  Pulm: Lungs clear bilaterally, good inspiratory effort   CV:  nL S1/S2  Abd: Slightly distended hypoactive bowel sounds  MSK: moving all extremities, no edema  Neuro: no focal deficits  Skin: no rashes/lesions  Psych: normal mood/affect    Diagnostic Data:    Labs:  Recent Labs   Lab 24  1510 24  0520 24  0907 24  0527   WBC 13.1* 9.4 6.1 7.3   HGB 17.9* 15.6 13.9 13.0   MCV 85.6 85.3 87.3 84.1   .0 238.0 181.0 197.0       Recent Labs   Lab 24  1510 24  0520 24  0527 24  0547   * 121* 90  --    BUN 32* 37* 13  --    CREATSERUM 1.71* 1.15* 0.57  --    CA 10.9* 10.1 9.4  --    ALB 5.2*  --   --   --     137 140  --    K 4.9 4.1 3.3* 3.7   CL 99 100 104  --    CO2 28.0 29.0 27.0  --    ALKPHO 74  --   --   --    AST 16  --   --   --    ALT 17  --   --   --    BILT 1.1  --   --   --    TP 8.3*  --   --   --        Estimated Creatinine Clearance: 48.4 mL/min (based on SCr of 0.57 mg/dL).    No results for input(s): \"TROP\", \"TROPHS\", \"CK\" in the last 168 hours.    No results for input(s): \"PTP\", \"INR\" in the last 168 hours.                Microbiology    Hospital Encounter on 07/21/24   1. Urine Culture, Routine     Status: None    Collection Time: 07/21/24  3:46 PM    Specimen: Urine, clean catch   Result Value Ref Range    Urine Culture No Growth at 18-24 hrs. N/A         Imaging: Reviewed in Epic.    Medications:    pantoprazole  40 mg Intravenous Q24H    heparin  5,000 Units Subcutaneous 2 times per day       Assessment & Plan:           Patient is a 89-year-old female significant past medical history of moderate protein calorie malnutrition, history of SBO in the past requiring ex lap with ASIF, history of SBO status post ex lap with lysis of adhesions in the past in October 2022, polycythemia, hypothyroidism, schizoaffective disorder, hyperlipidemia, aortic atherosclerosis, presented with abdominal distention as well as vomiting M, imaging consistent with SBO     # Closed-loop SBO  -History of SBO in the past, n.p.o., NG tube now clamped  -Lactic acid within normal limits, serial abdominal exams  -Abdominal series reviewed-marked improvement in the bowel obstruction, no more dilated small bowel loops  -General surgery on board, IV fluids, abdominal x-rays-if successful clamping then start clear liquid diet advance as tolerated per surg - NGT out, tolerating clears, ADAT to full per surgery  - NGT reinserted 7/25 PM  - SBFT results pending  - NPO, IVF     # WAYNE  -Resolved  -Creatinine admission was 1.71, baseline is 0.87, likely prerenal from nausea vomiting  -Continue fluids at this time, decrease to maintenance dosing - dc fluids today     # Hypothyroidism  -Resume Synthroid when taking p.o.     # Hyperlipidemia  # Carotid artery stenosis aortic atherosclerosis       Isabelle Sharma MD    Supplementary Documentation:     Quality:  DVT Mechanical Prophylaxis:   SCDs,    DVT Pharmacologic Prophylaxis   Medication    heparin (Porcine) 5000 UNIT/ML injection 5,000 Units                Code Status: Full Code  Mi: No urinary catheter in  place  Mi Duration (in days):   Central line:    GINA: 7/26/2024  The 21st Century Cures Act makes medical notes like these available to patients in the interest of transparency. Please be advised this is a medical document. Medical documents are intended to carry relevant information, facts as evident, and the clinical opinion of the practitioner. The medical note is intended as peer to peer communication and may appear blunt or direct. It is written in medical language and may contain abbreviations or verbiage that are unfamiliar.

## 2024-07-26 NOTE — CM/SW NOTE
07/26/24 1000   Choice of Post-Acute Provider   Informed patient of right to choose their preferred provider Yes   List of appropriate post-acute services provided to patient/family with quality data Yes   Patient/family choice [facility]     Sw spoke to daughter this am- she chose [facility] for HHC. Reserved in aidin.  They will need notification of dc date.    Pt discussed in rounds- she had to have NG pur back in yesterday- needs small bowel follow through today.    Eufemia Yan, TREVORW  /Discharge Planner

## 2024-07-26 NOTE — PROGRESS NOTES
Patient is A&O x 2-3, confused and forgetful, but follows commands and needs reorientation. Tele NSR. 02 sat. @ 94% on RA. Abdomen hard and pt. C/o pain. She had multiple emesis episodes. New orders from Dr. Rodrigues to put NGT in LIS. Taped at 56cm. 1200ml output immediately. Pt. States pain is better. NPO with ice chips. IVF NS @100ml/hr. Call light in reach, bed in lowest position with alarm on. Per Dr. Rodrigues, ok to hold Heparin this morning in case of sx.

## 2024-07-26 NOTE — DISCHARGE INSTRUCTIONS
Sometimes managing your health at home requires assistance.  The [facility] team has recognized your preference to use [employer].  They can be reached at [phone].  The fax number for your reference is [phone].  A representative from the home health agency will contact you or your family to schedule your first visit.

## 2024-07-27 ENCOUNTER — APPOINTMENT (OUTPATIENT)
Dept: GENERAL RADIOLOGY | Facility: HOSPITAL | Age: 89
End: 2024-07-27
Attending: SURGERY
Payer: MEDICARE

## 2024-07-27 LAB
ANION GAP SERPL CALC-SCNC: 9 MMOL/L (ref 0–18)
BASOPHILS # BLD AUTO: 0.03 X10(3) UL (ref 0–0.2)
BASOPHILS NFR BLD AUTO: 0.5 %
BUN BLD-MCNC: 17 MG/DL (ref 9–23)
CALCIUM BLD-MCNC: 8.7 MG/DL (ref 8.7–10.4)
CHLORIDE SERPL-SCNC: 109 MMOL/L (ref 98–112)
CO2 SERPL-SCNC: 24 MMOL/L (ref 21–32)
CREAT BLD-MCNC: 0.59 MG/DL
EGFRCR SERPLBLD CKD-EPI 2021: 86 ML/MIN/1.73M2 (ref 60–?)
EOSINOPHIL # BLD AUTO: 0.11 X10(3) UL (ref 0–0.7)
EOSINOPHIL NFR BLD AUTO: 1.7 %
ERYTHROCYTE [DISTWIDTH] IN BLOOD BY AUTOMATED COUNT: 13.7 %
GLUCOSE BLD-MCNC: 79 MG/DL (ref 70–99)
HCT VFR BLD AUTO: 41 %
HGB BLD-MCNC: 13.8 G/DL
IMM GRANULOCYTES # BLD AUTO: 0.04 X10(3) UL (ref 0–1)
IMM GRANULOCYTES NFR BLD: 0.6 %
LYMPHOCYTES # BLD AUTO: 0.72 X10(3) UL (ref 1–4)
LYMPHOCYTES NFR BLD AUTO: 11.2 %
MCH RBC QN AUTO: 28.4 PG (ref 26–34)
MCHC RBC AUTO-ENTMCNC: 33.7 G/DL (ref 31–37)
MCV RBC AUTO: 84.4 FL
MONOCYTES # BLD AUTO: 0.79 X10(3) UL (ref 0.1–1)
MONOCYTES NFR BLD AUTO: 12.2 %
NEUTROPHILS # BLD AUTO: 4.76 X10 (3) UL (ref 1.5–7.7)
NEUTROPHILS # BLD AUTO: 4.76 X10(3) UL (ref 1.5–7.7)
NEUTROPHILS NFR BLD AUTO: 73.8 %
OSMOLALITY SERPL CALC.SUM OF ELEC: 294 MOSM/KG (ref 275–295)
PLATELET # BLD AUTO: 204 10(3)UL (ref 150–450)
POTASSIUM SERPL-SCNC: 3.5 MMOL/L (ref 3.5–5.1)
POTASSIUM SERPL-SCNC: 3.5 MMOL/L (ref 3.5–5.1)
RBC # BLD AUTO: 4.86 X10(6)UL
SODIUM SERPL-SCNC: 142 MMOL/L (ref 136–145)
WBC # BLD AUTO: 6.5 X10(3) UL (ref 4–11)

## 2024-07-27 PROCEDURE — 80048 BASIC METABOLIC PNL TOTAL CA: CPT | Performed by: HOSPITALIST

## 2024-07-27 PROCEDURE — 85025 COMPLETE CBC W/AUTO DIFF WBC: CPT | Performed by: HOSPITALIST

## 2024-07-27 PROCEDURE — 74018 RADEX ABDOMEN 1 VIEW: CPT | Performed by: SURGERY

## 2024-07-27 PROCEDURE — 84132 ASSAY OF SERUM POTASSIUM: CPT | Performed by: HOSPITALIST

## 2024-07-27 NOTE — PROGRESS NOTES
[facility]  Progress Note    Catherine Bustamante Patient Status:  Inpatient    1935 MRN [id number]   Location [facility] 3NW-A Attending Bill Sharma*   Hosp Day # 6 PCP Flynn Polk MD     Subjective:  Patient is awake and more oriented and answering appropriately.  Passing flatus and less NGT out.  Awaiting KUB today    Objective/Physical Exam:  General: No apparent distress.  Vital Signs:  Blood pressure 131/61, pulse 78, temperature 98.4 °F (36.9 °C), temperature source Oral, resp. rate 16, height 5' 3\" (1.6 m), weight 100 lb 14.4 oz (45.8 kg), SpO2 92%, not currently breastfeeding.    Abdomen:  soft, less distended, non-tender  Extremities:  No calf tenderness       Intake/Output Summary (Last 24 hours) at 2024 0935  Last data filed at 2024 0729  Gross per 24 hour   Intake 297 ml   Output 2000 ml   Net -1703 ml       Labs:  Lab Results   Component Value Date    WBC 6.5 2024    HGB 13.8 2024    HCT 41.0 2024    .0 2024    CREATSERUM 0.59 2024    BUN 17 2024     2024    K 3.5 2024    K 3.5 2024     2024    CO2 24.0 2024    GLU 79 2024    CA 8.7 2024       Assessment/Plan: SBO    -awaiting for KUB today  -if the contrast is not seen in the colon then will discuss about surgery  -if the contrast is seen in the colon then will start clear liquid  -voiced understanding    Ирина Rodrigues MD  2024  9:35 AM

## 2024-07-27 NOTE — PROGRESS NOTES
A&Ox4. VSS. RA. . Denies chest pain & SOB.  Telemetry: NSR  GI: Abdomen soft, nondistended. Passing gas.  Denies nausea.  : Voids, incont. At times.  Pain controlled with PRN pain medications  Up with standby assist.  Drains: NGT to LIS in R tracye taped @56cm.  Diet: NPO with ice chips  IVF running per order.  Plan for KUB 7/27 to determine if surgical intervention is needed.  All appropriate safety measures in place. All questions and concerns addressed.

## 2024-07-27 NOTE — PROGRESS NOTES
1045: Patient A&Ox2-3; confusion regarding time and situation. Vital signs stable on room air. Denies pain at this time. Abdomen soft, nondistended, tender. Bowel sounds present; hypoactive. Patient not passing gas. Denies nausea, vomiting. NG tube in place and draining green output to low intermittent suction. NPO diet order in place, ice chips being tolerated well. Patient and POA updated on plan of care. Questions and concerns discussed.     1530: NG tube clamped, clear liquid diet ordered per MD. Patient and daughter, Maryan, updated on plan of care.

## 2024-07-27 NOTE — PROGRESS NOTES
[facility]   part of [facility]     Hospitalist Progress Note     Catherine Bustamante Patient Status:  Inpatient    1935 MRN [id number]   Location [facility] 3NW-A Attending Hans Marino MD   Hosp Day # 6 PCP Flynn Polk MD     Chief Complaint: see HPI    Subjective:     Patient seen and examined.   Stable, no complaints.   Denies abd pain currently.  Denies CP/SOB.  NAD.     Objective:    Review of Systems:   A comprehensive review of systems was completed; pertinent positive and negatives stated in subjective.    Vital signs:  Temp:  [97.6 °F (36.4 °C)-98.4 °F (36.9 °C)] 98.4 °F (36.9 °C)  Pulse:  [76-82] 78  Resp:  [16-18] 16  BP: (118-131)/(58-64) 131/61  SpO2:  [92 %-97 %] 92 %    Physical Exam:        Gen: No acute distress, alert and oriented x 3  Pulm: Lungs clear bilaterally, good inspiratory effort   CV:  nL S1/S2  Abd: Slightly distended hypoactive bowel sounds  MSK: moving all extremities, no edema  Neuro: no focal deficits  Skin: no rashes/lesions  Psych: normal mood/affect    Diagnostic Data:    Labs:  Recent Labs   Lab 24  1510 24  0520 24  0907 24  0527 24  0650   WBC 13.1* 9.4 6.1 7.3 6.5   HGB 17.9* 15.6 13.9 13.0 13.8   MCV 85.6 85.3 87.3 84.1 84.4   .0 238.0 181.0 197.0 204.0       Recent Labs   Lab 24  1510 24  0520 24  0527 24  0547 24  0650   * 121* 90  --  79   BUN 32* 37* 13  --  17   CREATSERUM 1.71* 1.15* 0.57  --  0.59   CA 10.9* 10.1 9.4  --  8.7   ALB 5.2*  --   --   --   --     137 140  --  142   K 4.9 4.1 3.3* 3.7 3.5  3.5   CL 99 100 104  --  109   CO2 28.0 29.0 27.0  --  24.0   ALKPHO 74  --   --   --   --    AST 16  --   --   --   --    ALT 17  --   --   --   --    BILT 1.1  --   --   --   --    TP 8.3*  --   --   --   --        Estimated Creatinine Clearance: 46.7 mL/min (based on SCr of 0.59 mg/dL).    No results for input(s): \"TROP\", \"TROPHS\", \"CK\" in the last 168 hours.    No  results for input(s): \"PTP\", \"INR\" in the last 168 hours.               Microbiology    Hospital Encounter on 07/21/24   1. Urine Culture, Routine     Status: None    Collection Time: 07/21/24  3:46 PM    Specimen: Urine, clean catch   Result Value Ref Range    Urine Culture No Growth at 18-24 hrs. N/A         Imaging: Reviewed in Epic.    Medications:    potassium chloride  40 mEq Intravenous Once    pantoprazole  40 mg Intravenous Q24H    heparin  5,000 Units Subcutaneous 2 times per day       Assessment & Plan:           Patient is a 89-year-old female significant past medical history of moderate protein calorie malnutrition, history of SBO in the past requiring ex lap with ASIF, history of SBO status post ex lap with lysis of adhesions in the past in October 2022, polycythemia, hypothyroidism, schizoaffective disorder, hyperlipidemia, aortic atherosclerosis, presented with abdominal distention as well as vomiting M, imaging consistent with SBO     # Closed-loop SBO  -History of SBO in the past, n.p.o., NG tube now clamped  -Lactic acid within normal limits, serial abdominal exams  -Abdominal series reviewed-marked improvement in the bowel obstruction, no more dilated small bowel loops  -General surgery on board, IV fluids, abdominal x-rays-if successful clamping then start clear liquid diet advance as tolerated per surg - NGT out, tolerating clears, ADAT to full per surgery  - NGT reinserted 7/25 PM  - SBFT results reviewed  - NPO, IVF     # WAYNE  -Resolved  -Creatinine admission was 1.71, baseline is 0.87, likely prerenal from nausea vomiting  -Continue fluids at this time, decrease to maintenance dosing - dc fluids today     # Hypothyroidism  -Resume Synthroid when taking p.o.     # Hyperlipidemia  # Carotid artery stenosis aortic atherosclerosis       Isabelle Sharma MD    Supplementary Documentation:     Quality:  DVT Mechanical Prophylaxis:   SCDs,    DVT Pharmacologic Prophylaxis   Medication    heparin  (Porcine) 5000 UNIT/ML injection 5,000 Units                Code Status: Full Code  Mi: No urinary catheter in place  Mi Duration (in days):   Central line:    GINA:   The 21st Century Cures Act makes medical notes like these available to patients in the interest of transparency. Please be advised this is a medical document. Medical documents are intended to carry relevant information, facts as evident, and the clinical opinion of the practitioner. The medical note is intended as peer to peer communication and may appear blunt or direct. It is written in medical language and may contain abbreviations or verbiage that are unfamiliar.

## 2024-07-28 LAB — POTASSIUM SERPL-SCNC: 3.4 MMOL/L (ref 3.5–5.1)

## 2024-07-28 PROCEDURE — 84132 ASSAY OF SERUM POTASSIUM: CPT | Performed by: HOSPITALIST

## 2024-07-28 NOTE — PROGRESS NOTES
[facility]  Progress Note    Catherine Bustamante Patient Status:  Inpatient    1935 MRN [id number]   Location [facility] 3NW-A Attending Edith, Bill Walton*   Hosp Day # 7 PCP Flynn Polk MD     Subjective:  Patient is awake and alert.  No pain but sore abdomen.  No nausea.  NGT with bilious output.  She is ambulating with a walker.  She is passing flatus and KUB showed contrast in the colon with decompressed bowel.    Objective/Physical Exam:  General: No apparent distress.  Vital Signs:  Blood pressure 128/74, pulse 90, temperature 98.1 °F (36.7 °C), temperature source Oral, resp. rate 16, height 5' 3\" (1.6 m), weight 100 lb 14.4 oz (45.8 kg), SpO2 96%, not currently breastfeeding.    Abdomen:  soft, non-distended, non-tender  Extremities:  No calf tenderness       Intake/Output Summary (Last 24 hours) at 2024 1537  Last data filed at 2024 0542  Gross per 24 hour   Intake 1351 ml   Output 651 ml   Net 700 ml       Labs:  Lab Results   Component Value Date    K 3.4 2024       Assessment/Plan: partial bowel obstruction    -no evidence of obstruction on SBFT but appear slow transit    -will keep the NGT to suction  -OBS in am  -encouraged ambulation  -will try clamping again tomorrow  -ok to have ice chips  -voiced understanding    Ирина Rodrigues MD  2024  3:37 PM

## 2024-07-28 NOTE — PROGRESS NOTES
A&Ox2-3. VSS. RA. .  Telemetry: NSR  GI: Abdomen soft, nondistended.  Denies nausea.  : Incontinent at times.  Denies pain.  Up with standby assist and walker.  Drains: NG tube clamped.  Diet: Clear  IVF running per order.  All appropriate safety measures in place. All questions and concerns addressed.    0600: Pt Feels bloated, abdomen mildly distended and slightly firm, denies nausea/emesis.

## 2024-07-28 NOTE — PROGRESS NOTES
[facility]   part of [facility]     Hospitalist Progress Note     Catherine Bustamante Patient Status:  Inpatient    1935 MRN [id number]   Location [facility] 3NW-A Attending Hans Marino MD   Hosp Day # 7 PCP Flynn Polk MD     Chief Complaint: see HPI    Subjective:     Patient seen and examined.   Stable, no complaints.   Denies abd pain currently.  Denies CP/SOB.  NAD.     Objective:    Review of Systems:   A comprehensive review of systems was completed; pertinent positive and negatives stated in subjective.    Vital signs:  Temp:  [98.3 °F (36.8 °C)-98.8 °F (37.1 °C)] 98.5 °F (36.9 °C)  Pulse:  [75-85] 80  Resp:  [16-18] 16  BP: (121-140)/(61-85) 123/63  SpO2:  [94 %-97 %] 95 %    Physical Exam:        Gen: No acute distress, alert and oriented x 3  Pulm: Lungs clear bilaterally, good inspiratory effort   CV:  nL S1/S2  Abd: Slightly distended hypoactive bowel sounds  MSK: moving all extremities, no edema  Neuro: no focal deficits  Skin: no rashes/lesions  Psych: normal mood/affect    Diagnostic Data:    Labs:  Recent Labs   Lab 24  1510 24  0520 24  0907 24  0527 24  0650   WBC 13.1* 9.4 6.1 7.3 6.5   HGB 17.9* 15.6 13.9 13.0 13.8   MCV 85.6 85.3 87.3 84.1 84.4   .0 238.0 181.0 197.0 204.0       Recent Labs   Lab 24  1510 24  0520 24  0527 24  0547 24  0650 24  0449   * 121* 90  --  79  --    BUN 32* 37* 13  --  17  --    CREATSERUM 1.71* 1.15* 0.57  --  0.59  --    CA 10.9* 10.1 9.4  --  8.7  --    ALB 5.2*  --   --   --   --   --     137 140  --  142  --    K 4.9 4.1 3.3* 3.7 3.5  3.5 3.4*   CL 99 100 104  --  109  --    CO2 28.0 29.0 27.0  --  24.0  --    ALKPHO 74  --   --   --   --   --    AST 16  --   --   --   --   --    ALT 17  --   --   --   --   --    BILT 1.1  --   --   --   --   --    TP 8.3*  --   --   --   --   --        Estimated Creatinine Clearance: 46.7 mL/min (based on SCr of 0.59  mg/dL).    No results for input(s): \"TROP\", \"TROPHS\", \"CK\" in the last 168 hours.    No results for input(s): \"PTP\", \"INR\" in the last 168 hours.               Microbiology    Hospital Encounter on 07/21/24   1. Urine Culture, Routine     Status: None    Collection Time: 07/21/24  3:46 PM    Specimen: Urine, clean catch   Result Value Ref Range    Urine Culture No Growth at 18-24 hrs. N/A         Imaging: Reviewed in Epic.    Medications:    potassium chloride  40 mEq Intravenous Once    pantoprazole  40 mg Intravenous Q24H    heparin  5,000 Units Subcutaneous 2 times per day       Assessment & Plan:           Patient is a 89-year-old female significant past medical history of moderate protein calorie malnutrition, history of SBO in the past requiring ex lap with ASIF, history of SBO status post ex lap with lysis of adhesions in the past in October 2022, polycythemia, hypothyroidism, schizoaffective disorder, hyperlipidemia, aortic atherosclerosis, presented with abdominal distention as well as vomiting M, imaging consistent with SBO     # Closed-loop SBO  -History of SBO in the past, n.p.o., NG tube now clamped  -Lactic acid within normal limits, serial abdominal exams  -Abdominal series reviewed-marked improvement in the bowel obstruction, no more dilated small bowel loops  -General surgery on board, IV fluids, abdominal x-rays-if successful clamping then start clear liquid diet advance as tolerated per surg - NGT out, tolerating clears, ADAT to full per surgery  - NGT reinserted 7/25 PM  - SBFT results reviewed  - NPO, IVF     # WAYNE  -Resolved  -Creatinine admission was 1.71, baseline is 0.87, likely prerenal from nausea vomiting  -Continue fluids at this time, decrease to maintenance dosing - dc fluids today     # Hypothyroidism  -Resume Synthroid when taking p.o.     # Hyperlipidemia  # Carotid artery stenosis aortic atherosclerosis       Isabelle Sharma MD    Supplementary Documentation:     Quality:  DVT  Mechanical Prophylaxis:   SCDs,    DVT Pharmacologic Prophylaxis   Medication    heparin (Porcine) 5000 UNIT/ML injection 5,000 Units                Code Status: Full Code  Mi: No urinary catheter in place  Mi Duration (in days):   Central line:    GINA:   The 21st Century Cures Act makes medical notes like these available to patients in the interest of transparency. Please be advised this is a medical document. Medical documents are intended to carry relevant information, facts as evident, and the clinical opinion of the practitioner. The medical note is intended as peer to peer communication and may appear blunt or direct. It is written in medical language and may contain abbreviations or verbiage that are unfamiliar.

## 2024-07-28 NOTE — PROGRESS NOTES
Patient A&Ox2-3; forget at times, anxious. Vital signs stable on room air. Denies pain at this time. Abdomen soft, nondistended, tender. Bowel sounds present; active. Patient belching, not passing gas. Denies nausea, vomiting, diarrhea. NG tube in place and draining moderate amount of green output to low intermittent suction. NPO diet order in place, ice chips being tolerated well. Patient and children updated on plan of care for continued bowel rest and repeat imaging tomorrow per MD. Questions and concerns discussed.

## 2024-07-29 ENCOUNTER — APPOINTMENT (OUTPATIENT)
Dept: GENERAL RADIOLOGY | Facility: HOSPITAL | Age: 89
End: 2024-07-29
Attending: SURGERY
Payer: MEDICARE

## 2024-07-29 LAB — POTASSIUM SERPL-SCNC: 3.8 MMOL/L (ref 3.5–5.1)

## 2024-07-29 PROCEDURE — 97116 GAIT TRAINING THERAPY: CPT

## 2024-07-29 PROCEDURE — 74019 RADEX ABDOMEN 2 VIEWS: CPT | Performed by: SURGERY

## 2024-07-29 PROCEDURE — 84132 ASSAY OF SERUM POTASSIUM: CPT | Performed by: HOSPITALIST

## 2024-07-29 PROCEDURE — 97110 THERAPEUTIC EXERCISES: CPT

## 2024-07-29 RX ORDER — SERTRALINE HYDROCHLORIDE 100 MG/1
100 TABLET, FILM COATED ORAL NIGHTLY
Status: DISCONTINUED | OUTPATIENT
Start: 2024-07-29 | End: 2024-08-06

## 2024-07-29 RX ORDER — LEVOTHYROXINE SODIUM 0.05 MG/1
50 TABLET ORAL
Status: DISCONTINUED | OUTPATIENT
Start: 2024-07-29 | End: 2024-08-06

## 2024-07-29 NOTE — PROGRESS NOTES
[facility]   part of [facility]     Hospitalist Progress Note     Catherine Bustamante Patient Status:  Inpatient    1935 MRN [id number]   Location [facility] 3NW-A Attending Hans Marino MD   Hosp Day # 8 PCP Flynn Polk MD     Chief Complaint: see HPI    Subjective:     Patient seen and examined.   Sitting in a chair.  Answers all questions, intermittently confused.  NG is clamped.    Objective:    Review of Systems:   A comprehensive review of systems was completed; pertinent positive and negatives stated in subjective.    Vital signs:  Temp:  [98.1 °F (36.7 °C)-98.8 °F (37.1 °C)] 98.1 °F (36.7 °C)  Pulse:  [75-97] 88  Resp:  [12-18] 16  BP: (120-132)/(63-77) 130/67  SpO2:  [84 %-97 %] 95 %    Physical Exam:        Gen: No acute distress, alert and oriented x 3  Pulm: Lungs clear bilaterally, good inspiratory effort   CV:  nL S1/S2  Abd: Slightly distended hypoactive, tympanic bowel sounds  MSK: moving all extremities, no edema  Neuro: no focal deficits  Skin: no rashes/lesions  Psych: normal mood/affect    Diagnostic Data:    Labs:  Recent Labs   Lab 24  0907 24  0527 24  0650   WBC 6.1 7.3 6.5   HGB 13.9 13.0 13.8   MCV 87.3 84.1 84.4   .0 197.0 204.0       Recent Labs   Lab 24  0527 24  0547 24  0650 24  0449 24  0505   GLU 90  --  79  --   --    BUN 13  --  17  --   --    CREATSERUM 0.57  --  0.59  --   --    CA 9.4  --  8.7  --   --      --  142  --   --    K 3.3*   < > 3.5  3.5 3.4* 3.8     --  109  --   --    CO2 27.0  --  24.0  --   --     < > = values in this interval not displayed.       Estimated Creatinine Clearance: 46.7 mL/min (based on SCr of 0.59 mg/dL).    No results for input(s): \"TROP\", \"TROPHS\", \"CK\" in the last 168 hours.    No results for input(s): \"PTP\", \"INR\" in the last 168 hours.               Microbiology    Hospital Encounter on 24   1. Urine Culture, Routine     Status: None    Collection  Time: 07/21/24  3:46 PM    Specimen: Urine, clean catch   Result Value Ref Range    Urine Culture No Growth at 18-24 hrs. N/A         Imaging: Reviewed in Epic.    Medications:    levothyroxine  50 mcg Oral Before breakfast    sertraline  100 mg Oral Nightly    pantoprazole  40 mg Intravenous Q24H    heparin  5,000 Units Subcutaneous 2 times per day       Assessment & Plan:           Patient is a 89-year-old female significant past medical history of moderate protein calorie malnutrition, history of SBO in the past requiring ex lap with ASIF, history of SBO status post ex lap with lysis of adhesions in the past in October 2022, polycythemia, hypothyroidism, schizoaffective disorder, hyperlipidemia, aortic atherosclerosis, presented with abdominal distention as well as vomiting, imaging consistent with SBO     # Closed-loop SBO  -History of SBO in the past,  -Lactic acid within normal limits, serial abdominal exams  -Abdominal series reviewed-marked improvement in the bowel obstruction, no more dilated small bowel loops  -General surgery on board, IV fluids, abdominal x-rays-if successful clamping then start clear liquid diet advance as tolerated per surg - NGT out, tolerating clears, ADAT to full per surgery  - NGT reinserted 7/25 PM  - SBFT results reviewed  -Clear liquid diet, NG clamped currently, reevaluate tomorrow    # WAYNE  -Resolved  -Creatinine admission was 1.71, baseline is 0.87, likely prerenal from nausea vomiting  -Continue fluids at this time, decrease to maintenance dosing - dc fluids today     # Hypothyroidism  -Resume Synthroid     #MDD  - Resume home sertraline     # Hyperlipidemia  # Carotid artery stenosis aortic atherosclerosis       Raffi Brown DO  [facility]      Supplementary Documentation:     Quality:  DVT Mechanical Prophylaxis:   SCDs,    DVT Pharmacologic Prophylaxis   Medication    heparin (Porcine) 5000 UNIT/ML injection 5,000 Units                Code Status:  Full Code  Im: No urinary catheter in place  Mi Duration (in days):   Central line:    GINA:   The 21st Century Cures Act makes medical notes like these available to patients in the interest of transparency. Please be advised this is a medical document. Medical documents are intended to carry relevant information, facts as evident, and the clinical opinion of the practitioner. The medical note is intended as peer to peer communication and may appear blunt or direct. It is written in medical language and may contain abbreviations or verbiage that are unfamiliar.

## 2024-07-29 NOTE — DIETARY NOTE
[facility]   part of [facility]    NUTRITION ASSESSMENT    Unable to diagnose malnutrition criteria at this time.      NUTRITION INTERVENTION:      Meal and Snacks - Monitor and encourage adequate PO intake.   Medical Food Supplements - Ensure Plus High Protein Daily and Ensure Clear Daily. Rationale/use for oral supplements discussed.  Coordination of Nutrition Care - GI/Surgery consult for nutrition support/diet advancement       PATIENT STATUS: Up in chair having clears. NG tube still in place. Plan to clamp NG. 1200mL out last 24h. If diet is unable to advance beyond fulls in next 24h, recommend nutrition support if aggressive care is pursued. RD available to make recommendations. No new wt. Will continue to monitor and follow pt nutritional status.     7/25-Diet advancing to clears per PA. NG out. Added ONS. No new wt. On RA  07/22/24 89 year old female admitted with abdominal, n/v. Chart reviewed for Low BMI  CT scan obtained revealing SBO. Pt with NG. Currently sleeping, no family at bedside. Unsure of PO and wt hx. Mod to mild wasting noted. Per surgery, conservative management at this time. RD to follow for diet advancement    ANTHROPOMETRICS:  Ht: 160 cm (5' 3\")  Wt: 45.8 kg (100 lb 14.4 oz).   BMI: Body mass index is 17.87 kg/m².  IBW: 52.3 kg      WEIGHT HISTORY:   Weight loss: No    Wt Readings from Last 10 Encounters:   07/21/24 45.8 kg (100 lb 14.4 oz)   10/28/23 45.4 kg (100 lb)   10/28/23 59.9 kg (132 lb)   10/09/22 54.4 kg (120 lb)   12/06/21 57.8 kg (127 lb 6.4 oz)   10/26/21 58.6 kg (129 lb 3.2 oz)   08/19/21 58.1 kg (128 lb)   08/05/21 58.5 kg (129 lb)   06/04/21 58.5 kg (129 lb)   12/04/20 59.6 kg (131 lb 6.4 oz)        NUTRITION:  Diet:       Procedures    Clear liquid diet Is Patient on Accuchecks? No      Food Allergies: No  Cultural/Ethnic/Mormonism Preferences Addressed: Yes    Percent Meals Eaten (last 3 days)       Date/Time Percent Meals Eaten (%)    07/26/24 0733 0 %     07/26/24 1245 0 %    07/26/24 1557 0 %    07/28/24 0110 0 %    07/28/24 0542 0 %    07/28/24 0900 0 %    07/28/24 1151 0 %    07/28/24 1733 0 %            GI system review: WNL Last BM: 7/25  Skin and wounds: intact    NUTRITION RELATED PHYSICAL FINDINGS:     1. Body Fat/Muscle Mass: moderate depletion body fat Midaxillary line and mild muscle depletion Temple region, Clavicle region, and Dorsal hand region     2. Fluid Accumulation: none per RN documentation     NUTRITION PRESCRIPTION: 45.8kg  Calories: 1729-8541 calories/day (30-35 kcal/kg)  Protein: 67-90 grams protein/day (1.5-2.0 grams protein per kg)  Fluid: ~1 ml/kcal or per MD discretion    NUTRITION DIAGNOSIS/PROBLEM:  Inadequate oral intake related to altered GI function/GI disorder as evidenced by need for NPO status       MONITOR AND EVALUATE/NUTRITION GOALS:  PO intake of 75% of meals TID - Not met, Continues  PO intake of 75% of oral nutrition supplement/s - Not met, Continues  Weight stable within 1 to 2 lbs during admission - Ongoing  Return to PO intake or advance diet in 24-48 hrs - Not met, Continues      MEDICATIONS:  LR @ 50mL, protonix  LABS:  noted    Pt is at High nutrition risk    Ambika Tai RD, LDN  Clinical Nutrition  [id number]

## 2024-07-29 NOTE — PROGRESS NOTES
Patient A&Ox4; confused at times. Vital signs stable on room air. Denies pain at this time. Abdomen soft, rounded and tender. Bowel sounds present; hypoactive. Patient not belching or passing gas. Denies nausea, vomiting. NG tube in place, clamped. Clear liquid diet being tolerated well. Patient updated on plan of care. Questions and concerns discussed.

## 2024-07-29 NOTE — PHYSICAL THERAPY NOTE
PHYSICAL THERAPY TREATMENT NOTE - INPATIENT    Room Number: 304/304-A     Session: 2     Number of Visits to Meet Established Goals: 3    Presenting Problem: SBO  Co-Morbidities : vertigo, HLD, SBO    ASSESSMENT   Patient demonstrates good  progress this session, goals updated to reflect patient performance.    Patient continues to function near baseline with bed mobility, transfers, and gait. Contributing factors to remaining limitations include impaired dynamic balance, cognitive deficits (memory impairments), and medical status.  Next session anticipate patient to progress gait.  Physical Therapy will continue to follow patient for duration of hospitalization.    Patient continues to benefit from continued skilled PT services: with no additional skilled services but increased support at home.    PLAN  PT Treatment Plan: Bed mobility;Body mechanics;Endurance;Energy conservation;Gait training;Transfer training  Rehab Potential : Good  Frequency (Obs):  (2-3x/week)    CURRENT GOALS     Goal #1 Patient is able to demonstrate supine - sit EOB @ level: supervision   MET   Goal #2 Patient is able to demonstrate transfers Sit to/from Stand at assistance level: supervision- MET      Goal #3 Patient is able to ambulate 250 feet with assist device:  LRAD  at assistance level: supervision      Goal #4     Goal #5     Goal #6     Goal Comments: Goals established on 2024 goals updated    SUBJECTIVE  \"Next time I'll wear my shoes and then I can race you\"   Pt states she enjoys walking and used to enjoy gardening     OBJECTIVE  Precautions: Bed/chair alarm;NG suction    WEIGHT BEARING RESTRICTION  Weight Bearing Restriction: None                PAIN ASSESSMENT   Ratin  Location: pt denies pain  Management Techniques: Activity promotion;Repositioning;Relaxation    BALANCE                                                                                                                       Static Sitting:  Fair +  Dynamic Sitting: Fair +           Static Standing: Fair -  Dynamic Standing: Poor +    ACTIVITY TOLERANCE                         O2 WALK         AM-PAC '6-Clicks' INPATIENT SHORT FORM - BASIC MOBILITY  How much difficulty does the patient currently have...  Patient Difficulty: Turning over in bed (including adjusting bedclothes, sheets and blankets)?: None   Patient Difficulty: Sitting down on and standing up from a chair with arms (e.g., wheelchair, bedside commode, etc.): None   Patient Difficulty: Moving from lying on back to sitting on the side of the bed?: A Little   How much help from another person does the patient currently need...   Help from Another: Moving to and from a bed to a chair (including a wheelchair)?: A Little   Help from Another: Need to walk in hospital room?: A Little   Help from Another: Climbing 3-5 steps with a railing?: A Little       AM-PAC Score:  Raw Score: 20   Approx Degree of Impairment: 35.83%   Standardized Score (AM-PAC Scale): 47.67   CMS Modifier (G-Code): CJ    FUNCTIONAL ABILITY STATUS  Gait Assessment   Functional Mobility/Gait Assessment  Gait Assistance: Contact guard assist  Distance (ft): 400  Assistive Device: Rolling walker  Pattern: Within Functional Limits (minor sway with turns)    Skilled Therapy Provided  Pt presents in BS chair    RN clamped NG tube just prior to session   Supervision for sit-stand with RW.   Ambulatory with RW and CGA for safety.   Ambulates with slow bruce and flat foot initial contact.   VC for sequencing with RW and posture.   Minor sway/LOB initially and with turns, CGA provided, pt with improved gait c distance    Pt tolerated well and denies pain.   Therapist cued pt for seated therex BLE c good return demo     Bed Mobility:  Rolling: NT  Supine<>Sit: NT  Sit<>Supine:NT     Transfer Mobility:  Sit<>Stand: supervision   Stand<>Sit: supervision   Gait: CGA c RW     Therapist's Comments: Recommend RW for ambulation. Reviewed  activity recommendations.   Pt continues to demonstrate STM deficits - ie \"where am I?\" \"Where do I go?\" Just seconds after therapist cues pt for direction and to find way back to room   THERAPEUTIC EXERCISES  Lower Extremity Alternating marching  Ankle pumps  LAQ     Upper Extremity      Position Sitting     Repetitions   10   Sets   1     Patient End of Session: Up in chair;Needs met;Call light within reach;RN aware of session/findings;All patient questions and concerns addressed;Alarm set    PT Session Time: 25 minutes  Gait Training: 10 minutes  Therapeutic Activity: 10 minutes  Therapeutic Exercise: 5 minutes   Neuromuscular Re-education:  minutes

## 2024-07-29 NOTE — PLAN OF CARE
Received pt at 1930. Pt is A&O x 3; follows commands, but has periodic moments of confusion at HS. Pt is on RA, VSS, tolerating ice chips after NG tube reconnected to LIS early am today. Pt is continent of bowel and bladder, ambulates to bathroom with 1 assist and walker. Pt denies pain. IV access is patent infusing 0.9 ns at 50 ml/hr. Shift assessment performed, no HS meds to give. NOC safety plan in place; bed in low position, bed alarm on, call light and personal items within reach, pt encouraged to call staff for assistance.     POC:   Repeat obstructive series Monday am

## 2024-07-29 NOTE — PROGRESS NOTES
[facility]  Progress Note    Catherine Bustamante Patient Status:  Inpatient    1935 MRN [id number]   Location [facility] 3NW-A Attending Raffi Brown,    Hosp Day # 8 PCP Flynn Polk MD     Subjective:    Patient sitting up in chair, denies any new complaints  NG output overnight 300cc    Objective/Physical Exam:    Vital Signs:  Blood pressure 120/77, pulse 97, temperature 98.1 °F (36.7 °C), temperature source Oral, resp. rate 18, height 5' 3\" (1.6 m), weight 100 lb 14.4 oz (45.8 kg), SpO2 96%, not currently breastfeeding.    General:  Alert, Cooperative.  No apparent distress.    Lungs:  Non labored breathing    Cardiac:  Regular rate and rhythm.     Abdomen:  soft, non tender, non distended    Extremities:  No lower extremity edema noted.  Without clubbing or cyanosis.        Labs:  Reviewed    Lab Results   Component Value Date    K 3.8 2024       Xray:  Reviewed    Problem List:  Patient Active Problem List   Diagnosis    Hyperlipidemia, [mixed] / Rx: Atorvastain / LDL Goal < 160 mg/dl - [facility]    Elevated Glucose / HbA1C < 6.0%    Polycythemia - PAOLA 2 mutation negative [HCC]    Vitamin D deficiency Rx vitamin d 3 [cholecalciferol]    Screening mammogram for breast cancer    Hypothyroidism    Benign paroxysmal positional vertigo due to bilateral vestibular disorder    Anxiety disorder    Asymptomatic carotid artery stenosis, bilateral    Chronic fatigue and malaise    LBBB (left bundle branch block)    Pure hypercholesterolemia    Vertigo    Schizoaffective disorder (HCC)    PVC's (premature ventricular contractions)    Anal polyp    Hyponatremia    Acute kidney injury (HCC)    Azotemia    Hyperglycemia    SBO (small bowel obstruction) (HCC)    Chest pain of uncertain etiology    WAYNE (acute kidney injury) (HCC)    Nausea and vomiting, unspecified vomiting type           Impression:     88 y/o with SBO  -sitting up non tender, non distended    Plan:    Obs series today  Clamp NG and  start clears, if tolerating for 24 hrs will remove NG and advance diet  Up in chair  All questions answered  DW LISA Phillips  [facility]  7/29/2024

## 2024-07-30 ENCOUNTER — APPOINTMENT (OUTPATIENT)
Dept: GENERAL RADIOLOGY | Facility: HOSPITAL | Age: 89
End: 2024-07-30
Attending: SURGERY
Payer: MEDICARE

## 2024-07-30 LAB
ANION GAP SERPL CALC-SCNC: 9 MMOL/L (ref 0–18)
BASOPHILS # BLD AUTO: 0.06 X10(3) UL (ref 0–0.2)
BASOPHILS NFR BLD AUTO: 0.5 %
BUN BLD-MCNC: 10 MG/DL (ref 9–23)
CALCIUM BLD-MCNC: 9 MG/DL (ref 8.7–10.4)
CHLORIDE SERPL-SCNC: 104 MMOL/L (ref 98–112)
CO2 SERPL-SCNC: 28 MMOL/L (ref 21–32)
CREAT BLD-MCNC: 0.74 MG/DL
EGFRCR SERPLBLD CKD-EPI 2021: 77 ML/MIN/1.73M2 (ref 60–?)
EOSINOPHIL # BLD AUTO: 0.08 X10(3) UL (ref 0–0.7)
EOSINOPHIL NFR BLD AUTO: 0.7 %
ERYTHROCYTE [DISTWIDTH] IN BLOOD BY AUTOMATED COUNT: 13.5 %
GLUCOSE BLD-MCNC: 115 MG/DL (ref 70–99)
HCT VFR BLD AUTO: 44 %
HGB BLD-MCNC: 15.5 G/DL
IMM GRANULOCYTES # BLD AUTO: 0.1 X10(3) UL (ref 0–1)
IMM GRANULOCYTES NFR BLD: 0.8 %
LYMPHOCYTES # BLD AUTO: 0.99 X10(3) UL (ref 1–4)
LYMPHOCYTES NFR BLD AUTO: 8.4 %
MCH RBC QN AUTO: 29 PG (ref 26–34)
MCHC RBC AUTO-ENTMCNC: 35.2 G/DL (ref 31–37)
MCV RBC AUTO: 82.4 FL
MONOCYTES # BLD AUTO: 0.81 X10(3) UL (ref 0.1–1)
MONOCYTES NFR BLD AUTO: 6.9 %
NEUTROPHILS # BLD AUTO: 9.77 X10 (3) UL (ref 1.5–7.7)
NEUTROPHILS # BLD AUTO: 9.77 X10(3) UL (ref 1.5–7.7)
NEUTROPHILS NFR BLD AUTO: 82.7 %
OSMOLALITY SERPL CALC.SUM OF ELEC: 292 MOSM/KG (ref 275–295)
PLATELET # BLD AUTO: 278 10(3)UL (ref 150–450)
POTASSIUM SERPL-SCNC: 3.2 MMOL/L (ref 3.5–5.1)
RBC # BLD AUTO: 5.34 X10(6)UL
SODIUM SERPL-SCNC: 141 MMOL/L (ref 136–145)
WBC # BLD AUTO: 11.8 X10(3) UL (ref 4–11)

## 2024-07-30 PROCEDURE — 74019 RADEX ABDOMEN 2 VIEWS: CPT | Performed by: SURGERY

## 2024-07-30 PROCEDURE — 85025 COMPLETE CBC W/AUTO DIFF WBC: CPT | Performed by: STUDENT IN AN ORGANIZED HEALTH CARE EDUCATION/TRAINING PROGRAM

## 2024-07-30 PROCEDURE — 80048 BASIC METABOLIC PNL TOTAL CA: CPT | Performed by: STUDENT IN AN ORGANIZED HEALTH CARE EDUCATION/TRAINING PROGRAM

## 2024-07-30 NOTE — PLAN OF CARE
Patient is alert and oriented x3-4. On room air. Tele with NSR. Replacing potassium per protocol.Abdomen is soft/ rounded. Patient denies passing gas. NGT reconnected to LIS last night. Secured at 56 cm. Patient denies nausea. Voiding freely. Patient denies pain. Up with SB. Receiving IVF. Call light within reach.

## 2024-07-30 NOTE — PROGRESS NOTES
Pt complaining of abdominal bloating and discomfort. Reconnected NG tube to LIS, 700 ml of dark green fluid into canister within 6 minutes. Pt states she feels less abdominal discomfort, still slightly distended.

## 2024-07-30 NOTE — PROGRESS NOTES
[facility]   part of Merg[facility]     Hospitalist Progress Note     Catherine Bustamante Patient Status:  Inpatient    1935 MRN [id number]   Location [facility] 3NW-A Attending Hans Marino MD   Hosp Day # 9 PCP Flynn Polk MD     Chief Complaint: see HPI    Subjective:     Patient seen and examined.   Sitting in a chair.  Answers all questions, intermittently confused. Overnight, NG had to be resumed, had 1 L of output.  Now clamped again . obstructive series today.    Objective:    Review of Systems:   A comprehensive review of systems was completed; pertinent positive and negatives stated in subjective.    Vital signs:  Temp:  [97.9 °F (36.6 °C)-98.5 °F (36.9 °C)] 97.9 °F (36.6 °C)  Pulse:  [] 88  Resp:  [16-18] 18  BP: (112-139)/(63-77) 112/63  SpO2:  [93 %-97 %] 97 %    Physical Exam:        Gen: No acute distress, alert and oriented x 3  Pulm: Lungs clear bilaterally, good inspiratory effort   CV:  nL S1/S2  Abd: Slightly distended hypoactive, tympanic bowel sounds  MSK: moving all extremities, no edema  Neuro: no focal deficits  Skin: no rashes/lesions  Psych: normal mood/affect    Diagnostic Data:    Labs:  Recent Labs   Lab 24  0527 24  0650 24  0546   WBC 7.3 6.5 11.8*   HGB 13.0 13.8 15.5   MCV 84.1 84.4 82.4   .0 204.0 278.0       Recent Labs   Lab 24  0527 24  0547 24  0650 24  0449 24  0505 24  0546   GLU 90  --  79  --   --  115*   BUN 13  --  17  --   --  10   CREATSERUM 0.57  --  0.59  --   --  0.74   CA 9.4  --  8.7  --   --  9.0     --  142  --   --  141   K 3.3*   < > 3.5  3.5 3.4* 3.8 3.2*     --  109  --   --  104   CO2 27.0  --  24.0  --   --  28.0    < > = values in this interval not displayed.       Estimated Creatinine Clearance: 37.3 mL/min (based on SCr of 0.74 mg/dL).    No results for input(s): \"TROP\", \"TROPHS\", \"CK\" in the last 168 hours.    No results for input(s): \"PTP\", \"INR\" in the  last 168 hours.               Microbiology    Hospital Encounter on 07/21/24   1. Urine Culture, Routine     Status: None    Collection Time: 07/21/24  3:46 PM    Specimen: Urine, clean catch   Result Value Ref Range    Urine Culture No Growth at 18-24 hrs. N/A         Imaging: Reviewed in Epic.    Medications:    levothyroxine  50 mcg Oral Before breakfast    sertraline  100 mg Oral Nightly    pantoprazole  40 mg Intravenous Q24H    heparin  5,000 Units Subcutaneous 2 times per day       Assessment & Plan:           Patient is a 89-year-old female significant past medical history of moderate protein calorie malnutrition, history of SBO in the past requiring ex lap with ASIF, history of SBO status post ex lap with lysis of adhesions in the past in October 2022, polycythemia, hypothyroidism, schizoaffective disorder, hyperlipidemia, aortic atherosclerosis, presented with abdominal distention as well as vomiting, imaging consistent with SBO     # Closed-loop SBO  -History of SBO in the past,  -Lactic acid within normal limits, serial abdominal exams  -Abdominal series reviewed-marked improvement in the bowel obstruction, no more dilated small bowel loops  -General surgery on board, IV fluids, abdominal x-rays-if successful clamping then start clear liquid diet advance as tolerated per surg - NGT out, tolerating clears, ADAT to full per surgery  - NGT reinserted 7/25 PM  - SBFT results reviewed  -Reviewed obstructive series 7/30-concerning for partial small bowel obstruction still.  - Will defer diet to general surgery.  Okay for ice chips    # WAYNE  -Resolved  -Creatinine admission was 1.71, baseline is 0.87, likely prerenal from nausea vomiting  -Continue fluids at this time, decrease to maintenance dosing - dc fluids today     # Hypothyroidism  -Resume Synthroid     #MDD  - Resume home sertraline     # Hyperlipidemia  # Carotid artery stenosis aortic atherosclerosis       aRffi Brown DO  [facility]ist  [facility]      Supplementary Documentation:     Quality:  DVT Mechanical Prophylaxis:   SCDs,    DVT Pharmacologic Prophylaxis   Medication    heparin (Porcine) 5000 UNIT/ML injection 5,000 Units                Code Status: Full Code  Mi: No urinary catheter in place  Mi Duration (in days):   Central line:    GINA:   The 21st Century Cures Act makes medical notes like these available to patients in the interest of transparency. Please be advised this is a medical document. Medical documents are intended to carry relevant information, facts as evident, and the clinical opinion of the practitioner. The medical note is intended as peer to peer communication and may appear blunt or direct. It is written in medical language and may contain abbreviations or verbiage that are unfamiliar.

## 2024-07-30 NOTE — PROGRESS NOTES
[facility]  Progress Note    Catherine Bustamante Patient Status:  Inpatient    1935 MRN [id number]   Location [facility] 3NW-A Attending Raffi Brown, DO   Hosp Day # 9 PCP Flynn Polk MD     Subjective:  Patient is in bed without any distress.  When the NGT was reconnected there was about 1000 ml of output.  She has been taking a bit of ice chips as well as drinking water.  She does not feel like she passed any flatus.  No complaints of pain.      Objective/Physical Exam:  General: No apparent distress.  Vital Signs:  Blood pressure 118/66, pulse 95, temperature 97.9 °F (36.6 °C), temperature source Oral, resp. rate 18, height 5' 3\" (1.6 m), weight 100 lb 14.4 oz (45.8 kg), SpO2 96%, not currently breastfeeding.    Abdomen:  less distended than yesterday, soft, no tenderness  Extremities:  No calf tenderness       Intake/Output Summary (Last 24 hours) at 2024 0838  Last data filed at 2024 0808  Gross per 24 hour   Intake 1320 ml   Output 1400 ml   Net -80 ml       Labs:  Lab Results   Component Value Date    WBC 11.8 2024    HGB 15.5 2024    HCT 44.0 2024    .0 2024    CREATSERUM 0.74 2024    BUN 10 2024     2024    K 3.2 2024     2024    CO2 28.0 2024     2024    CA 9.0 2024       Assessment/Plan: ileus more likely given her clinical exam rather than partial bowel obstruction    -will cut back on water and also ice chips one cup per shift  -keep the NGT clamped  -will check OBS today  -voiced understanding    Ирина Rodrigues MD  2024  8:38 AM

## 2024-07-30 NOTE — SPIRITUAL CARE NOTE
Spiritual Care Visit Note    Patient Name: Catherine Bustamante Date of Spiritual Care Visit: 24   : 1935 Primary Dx: WAYNE (acute kidney injury) (HCC)       Referred By:      Spiritual Care Taxonomy:    Intended Effects: Build relationship of care and support    Methods: Collaborate with care team member;Encourage story-telling    Interventions: Acknowledge response to difficult experience;Prayer for healing    Visit Type/Summary:     - Spiritual Care: Consulted with RN prior to visit. Offered empathic listening and emotional support.    Spiritual Care support can be requested via an Epic consult. For urgent/immediate needs, please contact the On Call  at: Edward: ext [phone]    Rev. Moni Arguello MDiv

## 2024-07-31 ENCOUNTER — APPOINTMENT (OUTPATIENT)
Dept: GENERAL RADIOLOGY | Facility: HOSPITAL | Age: 89
End: 2024-07-31
Attending: PHYSICIAN ASSISTANT
Payer: MEDICARE

## 2024-07-31 LAB
ANION GAP SERPL CALC-SCNC: 9 MMOL/L (ref 0–18)
BASOPHILS # BLD AUTO: 0.06 X10(3) UL (ref 0–0.2)
BASOPHILS NFR BLD AUTO: 0.6 %
BUN BLD-MCNC: 10 MG/DL (ref 9–23)
CALCIUM BLD-MCNC: 8.6 MG/DL (ref 8.7–10.4)
CHLORIDE SERPL-SCNC: 107 MMOL/L (ref 98–112)
CO2 SERPL-SCNC: 25 MMOL/L (ref 21–32)
CREAT BLD-MCNC: 0.58 MG/DL
EGFRCR SERPLBLD CKD-EPI 2021: 86 ML/MIN/1.73M2 (ref 60–?)
EOSINOPHIL # BLD AUTO: 0.12 X10(3) UL (ref 0–0.7)
EOSINOPHIL NFR BLD AUTO: 1.2 %
ERYTHROCYTE [DISTWIDTH] IN BLOOD BY AUTOMATED COUNT: 13.8 %
GLUCOSE BLD-MCNC: 84 MG/DL (ref 70–99)
HCT VFR BLD AUTO: 43.6 %
HGB BLD-MCNC: 14.4 G/DL
IMM GRANULOCYTES # BLD AUTO: 0.11 X10(3) UL (ref 0–1)
IMM GRANULOCYTES NFR BLD: 1.1 %
LYMPHOCYTES # BLD AUTO: 1.04 X10(3) UL (ref 1–4)
LYMPHOCYTES NFR BLD AUTO: 10.7 %
MAGNESIUM SERPL-MCNC: 1.7 MG/DL (ref 1.6–2.6)
MCH RBC QN AUTO: 28.3 PG (ref 26–34)
MCHC RBC AUTO-ENTMCNC: 33 G/DL (ref 31–37)
MCV RBC AUTO: 85.7 FL
MONOCYTES # BLD AUTO: 0.61 X10(3) UL (ref 0.1–1)
MONOCYTES NFR BLD AUTO: 6.3 %
NEUTROPHILS # BLD AUTO: 7.77 X10 (3) UL (ref 1.5–7.7)
NEUTROPHILS # BLD AUTO: 7.77 X10(3) UL (ref 1.5–7.7)
NEUTROPHILS NFR BLD AUTO: 80.1 %
OSMOLALITY SERPL CALC.SUM OF ELEC: 290 MOSM/KG (ref 275–295)
PHOSPHATE SERPL-MCNC: 2.5 MG/DL (ref 2.4–5.1)
PLATELET # BLD AUTO: 269 10(3)UL (ref 150–450)
POTASSIUM SERPL-SCNC: 3.7 MMOL/L (ref 3.5–5.1)
POTASSIUM SERPL-SCNC: 3.7 MMOL/L (ref 3.5–5.1)
RBC # BLD AUTO: 5.09 X10(6)UL
SODIUM SERPL-SCNC: 141 MMOL/L (ref 136–145)
WBC # BLD AUTO: 9.7 X10(3) UL (ref 4–11)

## 2024-07-31 PROCEDURE — 84100 ASSAY OF PHOSPHORUS: CPT

## 2024-07-31 PROCEDURE — 80048 BASIC METABOLIC PNL TOTAL CA: CPT | Performed by: STUDENT IN AN ORGANIZED HEALTH CARE EDUCATION/TRAINING PROGRAM

## 2024-07-31 PROCEDURE — 83735 ASSAY OF MAGNESIUM: CPT

## 2024-07-31 PROCEDURE — 74019 RADEX ABDOMEN 2 VIEWS: CPT | Performed by: PHYSICIAN ASSISTANT

## 2024-07-31 PROCEDURE — 97116 GAIT TRAINING THERAPY: CPT

## 2024-07-31 PROCEDURE — 84132 ASSAY OF SERUM POTASSIUM: CPT | Performed by: STUDENT IN AN ORGANIZED HEALTH CARE EDUCATION/TRAINING PROGRAM

## 2024-07-31 PROCEDURE — 85025 COMPLETE CBC W/AUTO DIFF WBC: CPT | Performed by: STUDENT IN AN ORGANIZED HEALTH CARE EDUCATION/TRAINING PROGRAM

## 2024-07-31 PROCEDURE — 97530 THERAPEUTIC ACTIVITIES: CPT

## 2024-07-31 NOTE — PROGRESS NOTES
[facility]  Progress Note    Catherine Bustamante Patient Status:  Inpatient    1935 MRN [id number]   Location [facility] 3NW-A Attending Raffi Brown,    Hosp Day # 10 PCP Flynn Polk MD     Subjective:    Patient reports no flatus  NG tube has been clamped, limited ice chips- residual checked 50cc    Objective/Physical Exam:    Vital Signs:  Blood pressure 129/64, pulse 77, temperature 98.2 °F (36.8 °C), temperature source Oral, resp. rate 18, height 5' 3\" (1.6 m), weight 100 lb 14.4 oz (45.8 kg), SpO2 97%, not currently breastfeeding.    General:  Alert,Cooperative.  No apparent distress.    Lungs:  Non labored breathing    Cardiac:  Regular rate and rhythm.     Abdomen:  soft, non tender, mild distension, tympanic     Extremities:  No lower extremity edema noted.  Without clubbing or cyanosis.        Labs:  Reviewed    Lab Results   Component Value Date    WBC 9.7 2024    HGB 14.4 2024    HCT 43.6 2024    .0 2024    CREATSERUM 0.58 2024    BUN 10 2024     2024    K 3.7 2024    K 3.7 2024     2024    CO2 25.0 2024    GLU 84 2024    CA 8.6 2024       Xray:  pending     Problem List:  Patient Active Problem List   Diagnosis    Hyperlipidemia, [mixed] / Rx: Atorvastain / LDL Goal < 160 mg/dl - S. [place]    Elevated Glucose / HbA1C < 6.0%    Polycythemia - PAOLA 2 mutation negative [HCC]    Vitamin D deficiency Rx vitamin d 3 [cholecalciferol]    Screening mammogram for breast cancer    Hypothyroidism    Benign paroxysmal positional vertigo due to bilateral vestibular disorder    Anxiety disorder    Asymptomatic carotid artery stenosis, bilateral    Chronic fatigue and malaise    LBBB (left bundle branch block)    Pure hypercholesterolemia    Vertigo    Schizoaffective disorder (HCC)    PVC's (premature ventricular contractions)    Anal polyp    Hyponatremia    Acute kidney injury (HCC)    Azotemia     Hyperglycemia    SBO (small bowel obstruction) (HCC)    Chest pain of uncertain etiology    WAYNE (acute kidney injury) (HCC)    Nausea and vomiting, unspecified vomiting type       Impression:     89 y/o with pSBO  NG residual 50cc    Plan:    Will obtain obs series  Keep NG tube clamped, consider restarting clears pending obs series  Will need to consider starting TPN  NICHOLAS Rodrigues      ADDEND:  Obs series reviewed, pSBO, contrast and air in colon   Will keep NG clamped today, start clears, check residual tomorrow  All questions answered  DW DR Ravi Varner PA  [facility]  7/31/2024

## 2024-07-31 NOTE — PLAN OF CARE
Patient's NG clamped at this time. She is NPO overnight. Patient will continue to be assessed for nausea, discomfort, and bloating.

## 2024-07-31 NOTE — PROGRESS NOTES
[facility]   part of [facility]     Hospitalist Progress Note     Catherine Bustamante Patient Status:  Inpatient    1935 MRN [id number]   Location [facility] 3NW-A Attending Hans Marino MD   Hosp Day # 10 PCP Flynn Polk MD     Chief Complaint: see HPI    Subjective:     Patient seen and examined.   Sitting in a chair.  Son at bedside.  Has some questions.  All answered to the best of my ability.  Denies any abdominal pain.  Passed some gas, still no bowel movements.  Feels very hungry.  NG tube clamped    Objective:    Review of Systems:   A comprehensive review of systems was completed; pertinent positive and negatives stated in subjective.    Vital signs:  Temp:  [97.4 °F (36.3 °C)-98.4 °F (36.9 °C)] 98.4 °F (36.9 °C)  Pulse:  [] 85  Resp:  [16-18] 16  BP: (123-148)/(62-75) 139/64  SpO2:  [93 %-98 %] 97 %    Physical Exam:        Gen: No acute distress, alert and oriented x 3  Pulm: Lungs clear bilaterally, good inspiratory effort   CV:  nL S1/S2  Abd: Slightly distended hypoactive, improved from previous, NG clamped  MSK: moving all extremities, no edema  Neuro: no focal deficits  Skin: no rashes/lesions  Psych: normal mood/affect    Diagnostic Data:    Labs:  Recent Labs   Lab 24  0527 24  0650 24  0546 24  0556   WBC 7.3 6.5 11.8* 9.7   HGB 13.0 13.8 15.5 14.4   MCV 84.1 84.4 82.4 85.7   .0 204.0 278.0 269.0       Recent Labs   Lab 24  0650 24  0449 24  0505 24  0546 24  0556   GLU 79  --   --  115* 84   BUN 17  --   --  10 10   CREATSERUM 0.59  --   --  0.74 0.58   CA 8.7  --   --  9.0 8.6*     --   --  141 141   K 3.5  3.5   < > 3.8 3.2* 3.7  3.7     --   --  104 107   CO2 24.0  --   --  28.0 25.0    < > = values in this interval not displayed.       Estimated Creatinine Clearance: 47.5 mL/min (based on SCr of 0.58 mg/dL).    No results for input(s): \"TROP\", \"TROPHS\", \"CK\" in the last 168 hours.    No  results for input(s): \"PTP\", \"INR\" in the last 168 hours.               Microbiology    Hospital Encounter on 07/21/24   1. Urine Culture, Routine     Status: None    Collection Time: 07/21/24  3:46 PM    Specimen: Urine, clean catch   Result Value Ref Range    Urine Culture No Growth at 18-24 hrs. N/A         Imaging: Reviewed in Epic.    Medications:    levothyroxine  50 mcg Oral Before breakfast    sertraline  100 mg Oral Nightly    pantoprazole  40 mg Intravenous Q24H    heparin  5,000 Units Subcutaneous 2 times per day       Assessment & Plan:           Patient is a 89-year-old female significant past medical history of moderate protein calorie malnutrition, history of SBO in the past requiring ex lap with ASIF, history of SBO status post ex lap with lysis of adhesions in the past in October 2022, polycythemia, hypothyroidism, schizoaffective disorder, hyperlipidemia, aortic atherosclerosis, presented with abdominal distention as well as vomiting, imaging consistent with SBO     # Closed-loop SBO  -History of SBO in the past,  -Lactic acid within normal limits, serial abdominal exams  -Abdominal series reviewed-marked improvement in the bowel obstruction, no more dilated small bowel loops  -General surgery on board, IV fluids, abdominal x-rays-if successful clamping then start clear liquid diet advance as tolerated per surg - NGT out, tolerating clears, ADAT to full per surgery  - NGT reinserted 7/25 PM  - SBFT results reviewed  -Reviewed obstructive series 7/30-concerning for partial small bowel obstruction still.  - Obstructive series 7/31 again with ileus versus partial obstruction  - NG clamped, will defer diet to general surgery      # WAYNE  -Resolved  -Creatinine admission was 1.71, baseline is 0.87, likely prerenal from nausea vomiting  -Continue fluids at this time, decrease to maintenance dosing - dc fluids today     # Hypothyroidism  -Resume Synthroid     #MDD  - Resume home sertraline     #  Hyperlipidemia  # Carotid artery stenosis aortic atherosclerosis       Raffi Brown DO  Hospitalist  [facility]      Supplementary Documentation:     Quality:  DVT Mechanical Prophylaxis:   SCDs,    DVT Pharmacologic Prophylaxis   Medication    heparin (Porcine) 5000 UNIT/ML injection 5,000 Units                Code Status: Full Code  Mi: No urinary catheter in place  Mi Duration (in days):   Central line:    GINA:   The 21st Century Cures Act makes medical notes like these available to patients in the interest of transparency. Please be advised this is a medical document. Medical documents are intended to carry relevant information, facts as evident, and the clinical opinion of the practitioner. The medical note is intended as peer to peer communication and may appear blunt or direct. It is written in medical language and may contain abbreviations or verbiage that are unfamiliar.

## 2024-07-31 NOTE — PHYSICAL THERAPY NOTE
PHYSICAL THERAPY TREATMENT NOTE - INPATIENT    Room Number: 304/304-A     Session: 3    Number of Visits to Meet Established Goals: 3    Presenting Problem: SBO  Co-Morbidities : vertigo, HLD, SBO    ASSESSMENT   Patient demonstrates good  progress this session, goals updated to reflect patient performance.    Patient continues to function near baseline with bed mobility, transfers, and gait. Contributing factors to remaining limitations include impaired dynamic balance, cognitive deficits (memory impairments), and medical status.  Next session anticipate patient to progress gait.  Physical Therapy will continue to follow patient for duration of hospitalization.    Patient continues to benefit from continued skilled PT services: with no additional skilled services but increased support at home.    PLAN  PT Treatment Plan: Bed mobility;Body mechanics;Endurance;Energy conservation;Gait training;Transfer training  Rehab Potential : Good  Frequency (Obs):  (2-3x/week)    CURRENT GOALS     Goal #1 Patient is able to demonstrate supine - sit EOB @ level: supervision   MET   Goal #2 Patient is able to demonstrate transfers Sit to/from Stand at assistance level: supervision- MET      Goal #3 Patient is able to ambulate 250 feet with assist device:  LRAD  at assistance level: supervision      Goal #4     Goal #5     Goal #6     Goal Comments: Goals established on 2024 goals updated    SUBJECTIVE  \"I don't feel well. I think I am depressed\"  Emotional support provided     OBJECTIVE  Precautions: Bed/chair alarm;NG suction    WEIGHT BEARING RESTRICTION  Weight Bearing Restriction: None                PAIN ASSESSMENT   Ratin  Location: pt denies pain  Management Techniques: Activity promotion;Repositioning;Relaxation    BALANCE                                                                                                                       Static Sitting: Fair +  Dynamic Sitting: Fair +            Static Standing: Fair -  Dynamic Standing: Fair -    ACTIVITY TOLERANCE                         O2 WALK         AM-PAC '6-Clicks' INPATIENT SHORT FORM - BASIC MOBILITY  How much difficulty does the patient currently have...  Patient Difficulty: Turning over in bed (including adjusting bedclothes, sheets and blankets)?: None   Patient Difficulty: Sitting down on and standing up from a chair with arms (e.g., wheelchair, bedside commode, etc.): None   Patient Difficulty: Moving from lying on back to sitting on the side of the bed?: A Little   How much help from another person does the patient currently need...   Help from Another: Moving to and from a bed to a chair (including a wheelchair)?: None   Help from Another: Need to walk in hospital room?: A Little   Help from Another: Climbing 3-5 steps with a railing?: A Little       AM-PAC Score:  Raw Score: 21   Approx Degree of Impairment: 28.97%   Standardized Score (AM-PAC Scale): 50.25   CMS Modifier (G-Code): CJ    FUNCTIONAL ABILITY STATUS  Gait Assessment   Functional Mobility/Gait Assessment  Gait Assistance: Contact guard assist;Supervision  Distance (ft): 300  Assistive Device: Rolling walker  Pattern: Within Functional Limits (decreaased bruce)    Skilled Therapy Provided  Pt presents in BS chair    NGT clamped upon arrival  Emotional support and empathic listening provided as pt states she does not feel well and mentions she may be depressed, pt denies pain - pt requires encouragement to participate in therapy, agreeable with education on benefits of mobility   Pt was gait trained c RW   Therapist cued pt for seated therex BLE c good return demo   Pt left in chair, needs met     Bed Mobility:  Rolling: NT  Supine<>Sit: NT  Sit<>Supine:NT     Transfer Mobility:  Sit<>Stand: supervision   Stand<>Sit: supervision   Gait: CGA c RW     Therapist's Comments: pt with memory deficits, states she has a son that lives \"far away\" however can not remember where          THERAPEUTIC EXERCISES  Lower Extremity Alternating marching  Ankle pumps  LAQ     Upper Extremity      Position Sitting     Repetitions   10   Sets   1     Patient End of Session: Up in chair;Needs met;Call light within reach;RN aware of session/findings;All patient questions and concerns addressed;Alarm set    PT Session Time: 25 minutes  Gait Training: 10 minutes  Therapeutic Activity: 10 minutes  Therapeutic Exercise: 5 minutes   Neuromuscular Re-education:  minutes

## 2024-07-31 NOTE — PLAN OF CARE
Pt A&Ox4. VSS on RA. Telemetry monitoring. IV fluids infusing as ordered. NG tube to right nare remains clamped, residual of 50 mL this AM. Plan is for obstructive series imaging today, will keep NPO. Call light within reach. Will continue to monitor.

## 2024-07-31 NOTE — DIETARY NOTE
[facility]   part of [facility]    NUTRITION ASSESSMENT    Pt meets moderate malnutrition criteria at this time.    CRITERIA FOR MALNUTRITION DIAGNOSIS:  Criteria for severe malnutrition diagnosis: acute illness/injury related to energy intake less than 50% for greater than 5 days, body fat moderate depletion, and muscle mass moderate depletion      NUTRITION INTERVENTION:    RD nutrition Care Plan- See RD nutrition assessment for additional recommendations  Meal and Snacks - Monitor and encourage adequate PO intake.   Medical Food Supplements - Ensure Plus High Protein Daily and Ensure Clear Daily when moved to PO. Rationale/use for oral supplements discussed.  Coordination of Nutrition Care - GI/Surgery consult for nutrition support/diet advancement       PATIENT STATUS:   7/31- Pt was sitting up in chair and more alert. Was able to answer a few questions. NG tube in place with 700 mL dark green fluid removed 7/30 but is now clamped. Pt reports feeling like she would like to eat. Has not been able to drink ONS d/t being NPO. Need to discuss starting TPN if pt remains NPO.     7/29-Up in chair having clears. NG tube still in place. Plan to clamp NG. 1200mL out last 24h. If diet is unable to advance beyond fulls in next 24h, recommend nutrition support if aggressive care is pursued. RD available to make recommendations. No new wt. Will continue to monitor and follow pt nutritional status.     7/25-Diet advancing to clears per PA. NG out. Added ONS. No new wt. On RA    07/22/24 89 year old female admitted with abdominal, n/v. Chart reviewed for Low BMI  CT scan obtained revealing SBO. Pt with NG. Currently sleeping, no family at bedside. Unsure of PO and wt hx. Mod to mild wasting noted. Per surgery, conservative management at this time. RD to follow for diet advancement    ANTHROPOMETRICS:  Ht: 160 cm (5' 3\")  Wt: 45.8 kg (100 lb 14.4 oz).   BMI: Body mass index is 17.87 kg/m².  IBW: 52.3 kg      WEIGHT  HISTORY:   Weight loss: No    Wt Readings from Last 10 Encounters:   07/21/24 45.8 kg (100 lb 14.4 oz)   10/28/23 45.4 kg (100 lb)   10/28/23 59.9 kg (132 lb)   10/09/22 54.4 kg (120 lb)   12/06/21 57.8 kg (127 lb 6.4 oz)   10/26/21 58.6 kg (129 lb 3.2 oz)   08/19/21 58.1 kg (128 lb)   08/05/21 58.5 kg (129 lb)   06/04/21 58.5 kg (129 lb)   12/04/20 59.6 kg (131 lb 6.4 oz)        NUTRITION:  Diet:       Procedures    NPO      Food Allergies: No  Cultural/Ethnic/Jain Preferences Addressed: Yes    Percent Meals Eaten (last 3 days)       Date/Time Percent Meals Eaten (%)    07/28/24 0110 0 %    07/28/24 0542 0 %    07/28/24 0900 0 %    07/28/24 1151 0 %    07/28/24 1733 0 %    07/29/24 1209 80 %    07/29/24 1751 50 %    07/29/24 2006 0 %    07/30/24 0043 0 %    07/30/24 0452 0 %    07/30/24 0808 0 %    07/30/24 1157 0 %    07/30/24 1606 0 %    07/31/24 0900 0 %     Percent Meals Eaten (%): NPO at 07/31/24 0900            GI system review: WNL Last BM: 7/25  Skin and wounds: intact    NUTRITION RELATED PHYSICAL FINDINGS:     1. Body Fat/Muscle Mass: moderate depletion body fat Triceps and Midaxillary line and mild muscle depletion Temple region, Clavicle region, Dorsal hand region, and Calf region     2. Fluid Accumulation: none per RN documentation     NUTRITION PRESCRIPTION: 45.8kg  Calories: 0190-7387 calories/day (30-35 kcal/kg)  Protein: 67-90 grams protein/day (1.5-2.0 grams protein per kg)  Fluid: ~1 ml/kcal or per MD discretion    NUTRITION DIAGNOSIS/PROBLEM:  Inadequate oral intake related to altered GI function/GI disorder as evidenced by need for NPO status       MONITOR AND EVALUATE/NUTRITION GOALS:  PO intake of 75% of meals TID - Not met, Continues  PO intake of 75% of oral nutrition supplement/s - Not met, Continues  Weight stable within 1 to 2 lbs during admission - Ongoing  Return to PO intake or advance diet in 24-48 hrs - Not met, Continues      MEDICATIONS:  Lactated ringer 50 mL/hr,  protonix  LABS:  Ca 8.6    Pt is at High nutrition risk    Reviewed and agree with note above.   Ambika Tai RD, LDN  Clinical Nutrition  [id number]

## 2024-08-01 ENCOUNTER — ANESTHESIA EVENT (OUTPATIENT)
Dept: SURGERY | Facility: HOSPITAL | Age: 89
End: 2024-08-01
Payer: MEDICARE

## 2024-08-01 ENCOUNTER — ANESTHESIA (OUTPATIENT)
Dept: SURGERY | Facility: HOSPITAL | Age: 89
End: 2024-08-01
Payer: MEDICARE

## 2024-08-01 LAB
ANION GAP SERPL CALC-SCNC: 9 MMOL/L (ref 0–18)
BASOPHILS # BLD AUTO: 0.08 X10(3) UL (ref 0–0.2)
BASOPHILS NFR BLD AUTO: 0.5 %
BUN BLD-MCNC: 7 MG/DL (ref 9–23)
CALCIUM BLD-MCNC: 9 MG/DL (ref 8.7–10.4)
CHLORIDE SERPL-SCNC: 105 MMOL/L (ref 98–112)
CO2 SERPL-SCNC: 25 MMOL/L (ref 21–32)
CREAT BLD-MCNC: 0.59 MG/DL
EGFRCR SERPLBLD CKD-EPI 2021: 86 ML/MIN/1.73M2 (ref 60–?)
EOSINOPHIL # BLD AUTO: 0.06 X10(3) UL (ref 0–0.7)
EOSINOPHIL NFR BLD AUTO: 0.4 %
ERYTHROCYTE [DISTWIDTH] IN BLOOD BY AUTOMATED COUNT: 13.7 %
GLUCOSE BLD-MCNC: 100 MG/DL (ref 70–99)
GLUCOSE BLD-MCNC: 108 MG/DL (ref 70–99)
HCT VFR BLD AUTO: 44.2 %
HGB BLD-MCNC: 15.1 G/DL
IMM GRANULOCYTES # BLD AUTO: 0.09 X10(3) UL (ref 0–1)
IMM GRANULOCYTES NFR BLD: 0.6 %
LACTATE SERPL-SCNC: 0.8 MMOL/L (ref 0.5–2)
LYMPHOCYTES # BLD AUTO: 0.93 X10(3) UL (ref 1–4)
LYMPHOCYTES NFR BLD AUTO: 6.3 %
MAGNESIUM SERPL-MCNC: 1.8 MG/DL (ref 1.6–2.6)
MCH RBC QN AUTO: 28.5 PG (ref 26–34)
MCHC RBC AUTO-ENTMCNC: 34.2 G/DL (ref 31–37)
MCV RBC AUTO: 83.6 FL
MONOCYTES # BLD AUTO: 0.89 X10(3) UL (ref 0.1–1)
MONOCYTES NFR BLD AUTO: 6 %
NEUTROPHILS # BLD AUTO: 12.75 X10 (3) UL (ref 1.5–7.7)
NEUTROPHILS # BLD AUTO: 12.75 X10(3) UL (ref 1.5–7.7)
NEUTROPHILS NFR BLD AUTO: 86.2 %
OSMOLALITY SERPL CALC.SUM OF ELEC: 287 MOSM/KG (ref 275–295)
PHOSPHATE SERPL-MCNC: 2.7 MG/DL (ref 2.4–5.1)
PLATELET # BLD AUTO: 258 10(3)UL (ref 150–450)
PLATELETS.RETICULATED NFR BLD AUTO: 2.6 % (ref 0–7)
POTASSIUM SERPL-SCNC: 3.4 MMOL/L (ref 3.5–5.1)
RBC # BLD AUTO: 5.29 X10(6)UL
SODIUM SERPL-SCNC: 139 MMOL/L (ref 136–145)
WBC # BLD AUTO: 14.8 X10(3) UL (ref 4–11)

## 2024-08-01 PROCEDURE — 3E0336Z INTRODUCTION OF NUTRITIONAL SUBSTANCE INTO PERIPHERAL VEIN, PERCUTANEOUS APPROACH: ICD-10-PCS | Performed by: STUDENT IN AN ORGANIZED HEALTH CARE EDUCATION/TRAINING PROGRAM

## 2024-08-01 PROCEDURE — 83605 ASSAY OF LACTIC ACID: CPT | Performed by: STUDENT IN AN ORGANIZED HEALTH CARE EDUCATION/TRAINING PROGRAM

## 2024-08-01 PROCEDURE — 3E0T3BZ INTRODUCTION OF ANESTHETIC AGENT INTO PERIPHERAL NERVES AND PLEXI, PERCUTANEOUS APPROACH: ICD-10-PCS | Performed by: STUDENT IN AN ORGANIZED HEALTH CARE EDUCATION/TRAINING PROGRAM

## 2024-08-01 PROCEDURE — 82962 GLUCOSE BLOOD TEST: CPT

## 2024-08-01 PROCEDURE — 83735 ASSAY OF MAGNESIUM: CPT | Performed by: SURGERY

## 2024-08-01 PROCEDURE — S0028 INJECTION, FAMOTIDINE, 20 MG: HCPCS | Performed by: SURGERY

## 2024-08-01 PROCEDURE — 0DNA0ZZ RELEASE JEJUNUM, OPEN APPROACH: ICD-10-PCS | Performed by: SURGERY

## 2024-08-01 PROCEDURE — 80048 BASIC METABOLIC PNL TOTAL CA: CPT | Performed by: STUDENT IN AN ORGANIZED HEALTH CARE EDUCATION/TRAINING PROGRAM

## 2024-08-01 PROCEDURE — 85025 COMPLETE CBC W/AUTO DIFF WBC: CPT | Performed by: STUDENT IN AN ORGANIZED HEALTH CARE EDUCATION/TRAINING PROGRAM

## 2024-08-01 PROCEDURE — 76942 ECHO GUIDE FOR BIOPSY: CPT | Performed by: STUDENT IN AN ORGANIZED HEALTH CARE EDUCATION/TRAINING PROGRAM

## 2024-08-01 PROCEDURE — 84100 ASSAY OF PHOSPHORUS: CPT

## 2024-08-01 RX ORDER — ONDANSETRON 2 MG/ML
INJECTION INTRAMUSCULAR; INTRAVENOUS AS NEEDED
Status: DISCONTINUED | OUTPATIENT
Start: 2024-08-01 | End: 2024-08-01 | Stop reason: SURG

## 2024-08-01 RX ORDER — HYDROMORPHONE HYDROCHLORIDE 1 MG/ML
0.4 INJECTION, SOLUTION INTRAMUSCULAR; INTRAVENOUS; SUBCUTANEOUS EVERY 5 MIN PRN
Status: DISCONTINUED | OUTPATIENT
Start: 2024-08-01 | End: 2024-08-01 | Stop reason: HOSPADM

## 2024-08-01 RX ORDER — FAMOTIDINE 20 MG/1
20 TABLET, FILM COATED ORAL 2 TIMES DAILY
Status: DISCONTINUED | OUTPATIENT
Start: 2024-08-01 | End: 2024-08-01 | Stop reason: DRUGHIGH

## 2024-08-01 RX ORDER — HYDROMORPHONE HYDROCHLORIDE 1 MG/ML
0.4 INJECTION, SOLUTION INTRAMUSCULAR; INTRAVENOUS; SUBCUTANEOUS EVERY 2 HOUR PRN
Status: DISCONTINUED | OUTPATIENT
Start: 2024-08-01 | End: 2024-08-06

## 2024-08-01 RX ORDER — HYDROCODONE BITARTRATE AND ACETAMINOPHEN 5; 325 MG/1; MG/1
2 TABLET ORAL ONCE AS NEEDED
Status: DISCONTINUED | OUTPATIENT
Start: 2024-08-01 | End: 2024-08-01 | Stop reason: HOSPADM

## 2024-08-01 RX ORDER — HYDROCODONE BITARTRATE AND ACETAMINOPHEN 5; 325 MG/1; MG/1
1 TABLET ORAL ONCE AS NEEDED
Status: DISCONTINUED | OUTPATIENT
Start: 2024-08-01 | End: 2024-08-01 | Stop reason: HOSPADM

## 2024-08-01 RX ORDER — FAMOTIDINE 20 MG/1
20 TABLET, FILM COATED ORAL DAILY
Status: DISCONTINUED | OUTPATIENT
Start: 2024-08-01 | End: 2024-08-06

## 2024-08-01 RX ORDER — ACETAMINOPHEN 500 MG
1000 TABLET ORAL ONCE AS NEEDED
Status: DISCONTINUED | OUTPATIENT
Start: 2024-08-01 | End: 2024-08-01 | Stop reason: HOSPADM

## 2024-08-01 RX ORDER — HYDROMORPHONE HYDROCHLORIDE 1 MG/ML
0.6 INJECTION, SOLUTION INTRAMUSCULAR; INTRAVENOUS; SUBCUTANEOUS EVERY 5 MIN PRN
Status: DISCONTINUED | OUTPATIENT
Start: 2024-08-01 | End: 2024-08-01 | Stop reason: HOSPADM

## 2024-08-01 RX ORDER — KETOROLAC TROMETHAMINE 30 MG/ML
15 INJECTION, SOLUTION INTRAMUSCULAR; INTRAVENOUS EVERY 6 HOURS
Status: COMPLETED | OUTPATIENT
Start: 2024-08-01 | End: 2024-08-02

## 2024-08-01 RX ORDER — SODIUM CHLORIDE, SODIUM LACTATE, POTASSIUM CHLORIDE, CALCIUM CHLORIDE 600; 310; 30; 20 MG/100ML; MG/100ML; MG/100ML; MG/100ML
INJECTION, SOLUTION INTRAVENOUS CONTINUOUS
Status: DISCONTINUED | OUTPATIENT
Start: 2024-08-01 | End: 2024-08-01 | Stop reason: HOSPADM

## 2024-08-01 RX ORDER — OXYCODONE HYDROCHLORIDE 5 MG/1
5 TABLET ORAL EVERY 4 HOURS PRN
Status: DISCONTINUED | OUTPATIENT
Start: 2024-08-01 | End: 2024-08-06

## 2024-08-01 RX ORDER — LABETALOL HYDROCHLORIDE 5 MG/ML
5 INJECTION, SOLUTION INTRAVENOUS EVERY 5 MIN PRN
Status: DISCONTINUED | OUTPATIENT
Start: 2024-08-01 | End: 2024-08-01 | Stop reason: HOSPADM

## 2024-08-01 RX ORDER — HYDROMORPHONE HYDROCHLORIDE 1 MG/ML
0.2 INJECTION, SOLUTION INTRAMUSCULAR; INTRAVENOUS; SUBCUTANEOUS EVERY 5 MIN PRN
Status: DISCONTINUED | OUTPATIENT
Start: 2024-08-01 | End: 2024-08-01 | Stop reason: HOSPADM

## 2024-08-01 RX ORDER — METOCLOPRAMIDE HYDROCHLORIDE 5 MG/ML
10 INJECTION INTRAMUSCULAR; INTRAVENOUS EVERY 8 HOURS PRN
Status: DISCONTINUED | OUTPATIENT
Start: 2024-08-01 | End: 2024-08-06

## 2024-08-01 RX ORDER — HYDROMORPHONE HYDROCHLORIDE 1 MG/ML
0.2 INJECTION, SOLUTION INTRAMUSCULAR; INTRAVENOUS; SUBCUTANEOUS EVERY 2 HOUR PRN
Status: DISCONTINUED | OUTPATIENT
Start: 2024-08-01 | End: 2024-08-06

## 2024-08-01 RX ORDER — MAGNESIUM SULFATE HEPTAHYDRATE 40 MG/ML
2 INJECTION, SOLUTION INTRAVENOUS ONCE
Status: DISCONTINUED | OUTPATIENT
Start: 2024-08-01 | End: 2024-08-03

## 2024-08-01 RX ORDER — PHENYLEPHRINE HCL 10 MG/ML
VIAL (ML) INJECTION AS NEEDED
Status: DISCONTINUED | OUTPATIENT
Start: 2024-08-01 | End: 2024-08-01 | Stop reason: SURG

## 2024-08-01 RX ORDER — DEXAMETHASONE SODIUM PHOSPHATE 4 MG/ML
VIAL (ML) INJECTION AS NEEDED
Status: DISCONTINUED | OUTPATIENT
Start: 2024-08-01 | End: 2024-08-01 | Stop reason: SURG

## 2024-08-01 RX ORDER — LIDOCAINE HYDROCHLORIDE 10 MG/ML
INJECTION, SOLUTION EPIDURAL; INFILTRATION; INTRACAUDAL; PERINEURAL AS NEEDED
Status: DISCONTINUED | OUTPATIENT
Start: 2024-08-01 | End: 2024-08-01 | Stop reason: SURG

## 2024-08-01 RX ORDER — METRONIDAZOLE 500 MG/100ML
INJECTION, SOLUTION INTRAVENOUS AS NEEDED
Status: DISCONTINUED | OUTPATIENT
Start: 2024-08-01 | End: 2024-08-01 | Stop reason: SURG

## 2024-08-01 RX ORDER — FAMOTIDINE 10 MG/ML
20 INJECTION, SOLUTION INTRAVENOUS DAILY
Status: DISCONTINUED | OUTPATIENT
Start: 2024-08-01 | End: 2024-08-06

## 2024-08-01 RX ORDER — ONDANSETRON 2 MG/ML
4 INJECTION INTRAMUSCULAR; INTRAVENOUS EVERY 6 HOURS PRN
Status: DISCONTINUED | OUTPATIENT
Start: 2024-08-01 | End: 2024-08-06

## 2024-08-01 RX ORDER — NALOXONE HYDROCHLORIDE 0.4 MG/ML
0.08 INJECTION, SOLUTION INTRAMUSCULAR; INTRAVENOUS; SUBCUTANEOUS AS NEEDED
Status: DISCONTINUED | OUTPATIENT
Start: 2024-08-01 | End: 2024-08-01 | Stop reason: HOSPADM

## 2024-08-01 RX ORDER — SODIUM CHLORIDE AND POTASSIUM CHLORIDE 150; 900 MG/100ML; MG/100ML
INJECTION, SOLUTION INTRAVENOUS CONTINUOUS
Status: DISCONTINUED | OUTPATIENT
Start: 2024-08-01 | End: 2024-08-03

## 2024-08-01 RX ORDER — FAMOTIDINE 10 MG/ML
20 INJECTION, SOLUTION INTRAVENOUS 2 TIMES DAILY
Status: DISCONTINUED | OUTPATIENT
Start: 2024-08-01 | End: 2024-08-01 | Stop reason: DRUGHIGH

## 2024-08-01 RX ORDER — SUCRALFATE ORAL 1 G/10ML
1 SUSPENSION ORAL EVERY 6 HOURS
Status: DISCONTINUED | OUTPATIENT
Start: 2024-08-01 | End: 2024-08-04

## 2024-08-01 RX ORDER — IBUPROFEN 600 MG/1
600 TABLET ORAL EVERY 6 HOURS SCHEDULED
Status: DISCONTINUED | OUTPATIENT
Start: 2024-08-02 | End: 2024-08-03

## 2024-08-01 RX ORDER — ROCURONIUM BROMIDE 10 MG/ML
INJECTION, SOLUTION INTRAVENOUS AS NEEDED
Status: DISCONTINUED | OUTPATIENT
Start: 2024-08-01 | End: 2024-08-01 | Stop reason: SURG

## 2024-08-01 RX ADMIN — PHENYLEPHRINE HCL 100 MCG: 10 MG/ML VIAL (ML) INJECTION at 15:40:00

## 2024-08-01 RX ADMIN — SODIUM CHLORIDE, SODIUM LACTATE, POTASSIUM CHLORIDE, CALCIUM CHLORIDE: 600; 310; 30; 20 INJECTION, SOLUTION INTRAVENOUS at 16:21:00

## 2024-08-01 RX ADMIN — METRONIDAZOLE 500 MG: 500 INJECTION, SOLUTION INTRAVENOUS at 15:30:00

## 2024-08-01 RX ADMIN — LIDOCAINE HYDROCHLORIDE 50 MG: 10 INJECTION, SOLUTION EPIDURAL; INFILTRATION; INTRACAUDAL; PERINEURAL at 15:30:00

## 2024-08-01 RX ADMIN — ONDANSETRON 4 MG: 2 INJECTION INTRAMUSCULAR; INTRAVENOUS at 16:06:00

## 2024-08-01 RX ADMIN — SODIUM CHLORIDE, SODIUM LACTATE, POTASSIUM CHLORIDE, CALCIUM CHLORIDE: 600; 310; 30; 20 INJECTION, SOLUTION INTRAVENOUS at 15:21:00

## 2024-08-01 RX ADMIN — DEXAMETHASONE SODIUM PHOSPHATE 4 MG: 4 MG/ML VIAL (ML) INJECTION at 15:40:00

## 2024-08-01 RX ADMIN — ROCURONIUM BROMIDE 50 MG: 10 INJECTION, SOLUTION INTRAVENOUS at 15:30:00

## 2024-08-01 NOTE — ANESTHESIA PROCEDURE NOTES
Peripheral IV  Date/Time: 8/1/2024 3:39 PM  Inserted by: Chi Conway MD    Placement  Needle size: 20 G  Laterality: right  Location: hand  Site prep: alcohol  Technique: anatomical landmarks  Attempts: 1

## 2024-08-01 NOTE — PROGRESS NOTES
[facility]   part of [facility]     Hospitalist Progress Note     Catherine Bustamante Patient Status:  Inpatient    1935 MRN [id number]   Location [facility] 3NW-A Attending Hans Marino MD   Hosp Day # 11 PCP Flynn Polk MD     Chief Complaint: see HPI    Subjective:     Patient seen and examined.   Laying in bed.  Has her eyes closed.  Denies any abdominal pain.  NG tube back to suction.  Plan for OR today.  Denies any fevers or chills.    Objective:    Review of Systems:   A comprehensive review of systems was completed; pertinent positive and negatives stated in subjective.    Vital signs:  Temp:  [97.4 °F (36.3 °C)-98.7 °F (37.1 °C)] 98 °F (36.7 °C)  Pulse:  [] 111  Resp:  [16-20] 16  BP: (121-141)/(65-77) 124/65  SpO2:  [94 %-96 %] 96 %    Physical Exam:        Gen: No acute distress, alert and oriented x 3  Pulm: Lungs clear bilaterally, good inspiratory effort   CV:  nL S1/S2  Abd: Slightly distended hypoactive, improved from previous, NG clamped  MSK: moving all extremities, no edema  Neuro: no focal deficits  Skin: no rashes/lesions  Psych: normal mood/affect    Diagnostic Data:    Labs:  Recent Labs   Lab 24  0650 24  0546 24  0556 24  0614   WBC 6.5 11.8* 9.7 14.8*   HGB 13.8 15.5 14.4 15.1   MCV 84.4 82.4 85.7 83.6   .0 278.0 269.0 258.0       Recent Labs   Lab 24  0546 24  0556 24  0614   * 84 108*   BUN 10 10 7*   CREATSERUM 0.74 0.58 0.59   CA 9.0 8.6* 9.0    141 139   K 3.2* 3.7  3.7 3.4*    107 105   CO2 28.0 25.0 25.0       Estimated Creatinine Clearance: 46.7 mL/min (based on SCr of 0.59 mg/dL).    No results for input(s): \"TROP\", \"TROPHS\", \"CK\" in the last 168 hours.    No results for input(s): \"PTP\", \"INR\" in the last 168 hours.               Microbiology    Hospital Encounter on 24   1. Urine Culture, Routine     Status: None    Collection Time: 24  3:46 PM    Specimen: Urine, clean  catch   Result Value Ref Range    Urine Culture No Growth at 18-24 hrs. N/A         Imaging: Reviewed in Epic.    Medications:    [Transfer Hold] potassium chloride  40 mEq Intravenous Once    piperacillin-tazobactam  3.375 g Intravenous Q8H    [Transfer Hold] magnesium sulfate  2 g Intravenous Once    [Transfer Hold] levothyroxine  50 mcg Oral Before breakfast    [Transfer Hold] sertraline  100 mg Oral Nightly    [Transfer Hold] pantoprazole  40 mg Intravenous Q24H    [Transfer Hold] heparin  5,000 Units Subcutaneous 2 times per day       Assessment & Plan:           Patient is a 89-year-old female significant past medical history of moderate protein calorie malnutrition, history of SBO in the past requiring ex lap with ASIF, history of SBO status post ex lap with lysis of adhesions in the past in October 2022, polycythemia, hypothyroidism, schizoaffective disorder, hyperlipidemia, aortic atherosclerosis, presented with abdominal distention as well as vomiting, imaging consistent with SBO.  Plan for OR today     # Closed-loop SBO  -History of SBO in the past,  - NGT reinserted 7/25 PM  - SBFT results reviewed  -Patient has failed conservative management, plan for OR later this afternoon-discussed with surgery  - Will start empiric Zosyn given elevated white count and persistent SBO. lactate is negative    Leukocytosis  - WBC 14, afebrile  - Will start empiric Zosyn (8/1-)    # WAYNE  -Resolved  -Continue maintenance fluids    # Hypothyroidism  -Resume Synthroid     #MDD  - Resume home sertraline     # Hyperlipidemia  # Carotid artery stenosis aortic atherosclerosis       Raffi Brown DO  [facility]      Supplementary Documentation:     Quality:  DVT Mechanical Prophylaxis:   SCDs,    DVT Pharmacologic Prophylaxis   Medication    [Transfer Hold] heparin (Porcine) 5000 UNIT/ML injection 5,000 Units                Code Status: Full Code  Mi: No urinary catheter in place  Mi Duration (in  days):   Central line:    GINA:   The 21st Century Cures Act makes medical notes like these available to patients in the interest of transparency. Please be advised this is a medical document. Medical documents are intended to carry relevant information, facts as evident, and the clinical opinion of the practitioner. The medical note is intended as peer to peer communication and may appear blunt or direct. It is written in medical language and may contain abbreviations or verbiage that are unfamiliar.

## 2024-08-01 NOTE — ANESTHESIA PROCEDURE NOTES
Airway  Date/Time: 8/1/2024 3:30 PM  Urgency: elective    Airway not difficult    General Information and Staff    Patient location during procedure: OR  Anesthesiologist: Chi Conway MD  Performed: anesthesiologist   Performed by: Chi Conway MD  Authorized by: Chi Conway MD      Indications and Patient Condition  Indications for airway management: anesthesia  Sedation level: deep  Preoxygenated: yes  Patient position: sniffing  Mask difficulty assessment: 0 - not attempted    Final Airway Details  Final airway type: endotracheal airway      Successful airway: ETT  Cuffed: yes   Successful intubation technique: direct laryngoscopy  Facilitating devices/methods: cricoid pressure and rapid sequence intubation  Endotracheal tube insertion site: oral  Blade: Annette  Blade size: #3  ETT size (mm): 7.0    Cormack-Lehane Classification: grade I - full view of glottis  Placement verified by: capnometry   Measured from: lips  ETT to lips (cm): 19  Number of attempts at approach: 1

## 2024-08-01 NOTE — DIETARY NOTE
[facility]   part of [facility]    NUTRITION ASSESSMENT    Pt meets severe malnutrition criteria at this time.    CRITERIA FOR MALNUTRITION DIAGNOSIS:  Criteria for severe malnutrition diagnosis: acute illness/injury related to energy intake less than 50% for greater than 5 days, body fat moderate depletion, and muscle mass moderate depletion      NUTRITION INTERVENTION:    RD nutrition Care Plan- See RD nutrition assessment for additional recommendations  Meal and Snacks - Monitor and encourage adequate PO intake.   Medical Food Supplements - Ensure Plus High Protein Daily and Ensure Clear Daily when moved to PO. Rationale/use for oral supplements discussed.  Coordination of Nutrition Care - GI/Surgery consult for nutrition support/diet advancement   Parenteral Nutrition -   TPN order for tonight to provide: Infused via peripheral line. Day 1:  400 dextrose calories, 280 lipid calories (33% lipids), 40 grams protein in 1700mL fluid. Provides 840 kcal, meeting ~ 52% of nutrition prescription. Osmolarity 799    TPN goal: 800 dextrose calories, 480 lipid calories (30% lipids), 80 grams protein in 1700mL fluid. Provides 1600 kcal.     PATIENT STATUS:   8/1- Pt failed conservative management, plan for surgical management. 450mL out via NG. RD to initiate PPN tonight. RD to follow daily for PN. No new wt.     7/31- Pt was sitting up in chair and more alert. Was able to answer a few questions. NG tube in place with 700 mL dark green fluid removed 7/30 but is now clamped. Pt reports feeling like she would like to eat. Has not been able to drink ONS d/t being NPO. Need to discuss starting TPN if pt remains NPO.     7/29-Up in chair having clears. NG tube still in place. Plan to clamp NG. 1200mL out last 24h. If diet is unable to advance beyond fulls in next 24h, recommend nutrition support if aggressive care is pursued. RD available to make recommendations. No new wt. Will continue to monitor and follow pt  nutritional status.     7/25-Diet advancing to clears per PA. NG out. Added ONS. No new wt. On RA    07/22/24 89 year old female admitted with abdominal, n/v. Chart reviewed for Low BMI  CT scan obtained revealing SBO. Pt with NG. Currently sleeping, no family at bedside. Unsure of PO and wt hx. Mod to mild wasting noted. Per surgery, conservative management at this time. RD to follow for diet advancement    ANTHROPOMETRICS:  Ht: 160 cm (5' 3\")  Wt: 45.8 kg (100 lb 14.4 oz).   BMI: Body mass index is 17.87 kg/m².  IBW: 52.3 kg      WEIGHT HISTORY:   Weight loss: No    Wt Readings from Last 10 Encounters:   07/21/24 45.8 kg (100 lb 14.4 oz)   10/28/23 45.4 kg (100 lb)   10/28/23 59.9 kg (132 lb)   10/09/22 54.4 kg (120 lb)   12/06/21 57.8 kg (127 lb 6.4 oz)   10/26/21 58.6 kg (129 lb 3.2 oz)   08/19/21 58.1 kg (128 lb)   08/05/21 58.5 kg (129 lb)   06/04/21 58.5 kg (129 lb)   12/04/20 59.6 kg (131 lb 6.4 oz)        NUTRITION:  Diet:       Procedures    Clear liquid diet Is Patient on Accuchecks? No      Food Allergies: No  Cultural/Ethnic/Jew Preferences Addressed: Yes    Percent Meals Eaten (last 3 days)       Date/Time Percent Meals Eaten (%)    07/29/24 1209 80 %    07/29/24 1751 50 %    07/29/24 2006 0 %    07/30/24 0043 0 %    07/30/24 0452 0 %    07/30/24 0808 0 %    07/30/24 1157 0 %    07/30/24 1606 0 %    07/31/24 0900 0 %     Percent Meals Eaten (%): NPO at 07/31/24 0900    07/31/24 1013 0 %     Percent Meals Eaten (%): NPO at 07/31/24 1013    07/31/24 1258 0 %     Percent Meals Eaten (%): NPO at 07/31/24 1258    07/31/24 1557 0 %     Percent Meals Eaten (%): NPO at 07/31/24 1557    07/31/24 1738 50 %    08/01/24 1150 0 %     Percent Meals Eaten (%): NPO at 08/01/24 1150            GI system review: WNL Last BM: 7/25  Skin and wounds: intact    NUTRITION RELATED PHYSICAL FINDINGS:     1. Body Fat/Muscle Mass: moderate depletion body fat Triceps and Midaxillary line and mild muscle depletion Temple  region, Clavicle region, Dorsal hand region, and Calf region     2. Fluid Accumulation: none per RN documentation     NUTRITION PRESCRIPTION: 45.8kg  Calories: 1802-5878 calories/day (30-35 kcal/kg)  Protein: 67-90 grams protein/day (1.5-2.0 grams protein per kg)  Fluid: ~1 ml/kcal or per MD discretion    NUTRITION DIAGNOSIS/PROBLEM:  Inadequate oral intake related to altered GI function/GI disorder as evidenced by need for NPO status       MONITOR AND EVALUATE/NUTRITION GOALS:  PO intake of 75% of meals TID - Not met, Continues  PO intake of 75% of oral nutrition supplement/s - Not met, Continues  Weight stable within 1 to 2 lbs during admission - Ongoing  Return to PO intake or advance diet in 24-48 hrs - Not met, Continues      MEDICATIONS:  noted  LABS:  noted    Pt is at High nutrition risk    Ambika Tai RD, LDN  Clinical Nutrition  [id number]

## 2024-08-01 NOTE — CM/SW NOTE
Care Progression Note:  Length of stay: 11  GMLOS: 3.1  Avoidable Delays:   Code Status: Full    Acute Medical Issue/Factors:   WAYNE (acute kidney injury) (HCC) -Resolved  SBO- NG clamping trial with clliq diet tolerated 7/31. NG residual of 400 ml this am. Surgery orders to re-attach NG to suction and advised surgical management, as pt failed conservative management.      Discharge Barriers: Surgical intervention for SBO and recovery.  Expected discharge date: TBD  Expected next site of care: Home with [facility] ([facility]     Will be re-evaluated by therapy following surgery. CM/SW will continue to follow and adjust DC plan as needed.     CHRISTINE TorresN, CMSRN    [id number]

## 2024-08-01 NOTE — PROGRESS NOTES
[facility]  Progress Note    Catherine Bustamante Patient Status:  Inpatient    1935 MRN [id number]   Location [facility] 3NW-A Attending Raffi Brown,    Hosp Day # 11 PCP Flynn Polk MD     Subjective:    Patient denies any new complaints  She had tolerated NG clamping and clear liquids  Her residual was checked this morning, 400cc    Objective/Physical Exam:    Vital Signs:  Blood pressure 141/74, pulse 91, temperature 97.5 °F (36.4 °C), temperature source Oral, resp. rate 18, height 5' 3\" (1.6 m), weight 100 lb 14.4 oz (45.8 kg), SpO2 95%, not currently breastfeeding.    General:  Alert, Cooperative.  No apparent distress.    Lungs:  Non labored breathing    Cardiac:  tachycardic      Abdomen:  soft, distended, notes feeling pressure like she has to void with palpation. No peritonitis     Extremities:  No lower extremity edema noted.  Without clubbing or cyanosis.        Labs:  Reviewed    Lab Results   Component Value Date    WBC 14.8 2024    HGB 15.1 2024    HCT 44.2 2024    .0 2024    CREATSERUM 0.59 2024    BUN 7 2024     2024    K 3.4 2024     2024    CO2 25.0 2024     2024    CA 9.0 2024       Problem List:  Patient Active Problem List   Diagnosis    Hyperlipidemia, [mixed] / Rx: Atorvastain / LDL Goal < 160 mg/dl - [facility]    Elevated Glucose / HbA1C < 6.0%    Polycythemia - PAOLA 2 mutation negative [HCC]    Vitamin D deficiency Rx vitamin d 3 [cholecalciferol]    Screening mammogram for breast cancer    Hypothyroidism    Benign paroxysmal positional vertigo due to bilateral vestibular disorder    Anxiety disorder    Asymptomatic carotid artery stenosis, bilateral    Chronic fatigue and malaise    LBBB (left bundle branch block)    Pure hypercholesterolemia    Vertigo    Schizoaffective disorder (HCC)    PVC's (premature ventricular contractions)    Anal polyp    Hyponatremia    Acute  kidney injury (HCC)    Azotemia    Hyperglycemia    SBO (small bowel obstruction) (HCC)    Chest pain of uncertain etiology    WAYNE (acute kidney injury) (HCC)    Nausea and vomiting, unspecified vomiting type       Impression:     89 y/o with pSBO  Residual from NG clamping is 400cc  Leukocytosis  SVT on tele    Plan:    Will re-attach NG to LIS today  Patient has failed conservative management, discussed with Maryan (daughter) over the phone recs for proceeding with surgical management, she is agreeable. She asked for surgical team to call and discuss with her brother today as well.   Continue IV fluids  NPO ice chips  Will place call to Catherine's son today  All questions answered    LISA Cardenas  [facility]  General Surgery  8/1/2024    Addendum  Agree as above  Will explore today since her output still remains high  Left message on Brandon's voicemail (patient's son who has the power of ) that will proceed with surgery this afternoon

## 2024-08-01 NOTE — PLAN OF CARE
Pt vitals stable, A&O x4. Telemetry in place. Stated having some discomfort to nose and throat, denied pain meds at this time. Tolerating clears without nausea. Passing gas. Bed locked in low position, call light in reach, rounding provided.

## 2024-08-01 NOTE — ANESTHESIA POSTPROCEDURE EVALUATION
[facility]    Catherine Bustamante Patient Status:  Inpatient   Age/Gender 89 year old female MRN [id number]   Location [facility] POST ANESTHESIA CARE UNIT Attending Raffi Brown DO   Hosp Day # 11 PCP Flynn Polk MD       Anesthesia Post-op Note    EXPLORATORY LAPAROTOMY WITH LYSIS OF ADHESIONS    Procedure Summary       Date: 08/01/24 Room / Location:  MAIN OR 03 / [facility] MAIN OR    Anesthesia Start: 1521 Anesthesia Stop: 1621    Procedure: EXPLORATORY LAPAROTOMY WITH LYSIS OF ADHESIONS (Abdomen) Diagnosis: (SMALL BOWEL OBSTRUCTION)    Surgeons: Ирина Rodrigues MD Anesthesiologist: Chi Conway MD    Anesthesia Type: Not recorded ASA Status: Not recorded            Anesthesia Type: No value filed.    Vitals Value Taken Time   /64 08/01/24 1624   Temp 97.8 08/01/24 1626   Pulse 78 08/01/24 1625   Resp 14 08/01/24 1625   SpO2 92 % 08/01/24 1625   Vitals shown include unfiled device data.    Patient Location: PACU    Anesthesia Type: general    Airway Patency: patent    Postop Pain Control: adequate    Mental Status: preanesthetic baseline    Nausea/Vomiting: none    Cardiopulmonary/Hydration status: stable euvolemic    Complications: no apparent anesthesia related complications    Postop vital signs: stable    Dental Exam: Unchanged from Preop    Patient to be discharged from PACU when criteria met.

## 2024-08-01 NOTE — PLAN OF CARE
Alert and oriented X 3 forgetful at times.   RA.   Tele: NSR Heparin.   Abdomen distended soft, Passing gas. NG at 55. LIS. Brown in color. No nausea. No pain.   NPO.   Voids.   Up with standby and a walker.   Call light in reach.

## 2024-08-01 NOTE — ANESTHESIA PREPROCEDURE EVALUATION
PRE-OP EVALUATION    Patient Name: Catherine Bustamante    Admit Diagnosis: SBO (small bowel obstruction) ([facility]) [K56.609]  WAYNE (acute kidney injury) ([facility]) [N17.9]  Nausea and vomiting, unspecified vomiting type [R11.2]    Pre-op Diagnosis: SMALL BOWEL OBSTRUCTION    EXPLORATORY LAPAROTOMY WITH LYSIS OF ADHESIONS, POSSIBLE BOWEL RESECTION    Anesthesia Procedure: EXPLORATORY LAPAROTOMY WITH LYSIS OF ADHESIONS, POSSIBLE BOWEL RESECTION (Abdomen)    Surgeons and Role:     * Ирина Rodrigues MD - Primary    Pre-op vitals reviewed.  Temp: 98 °F (36.7 °C)  Pulse: 111  Resp: 16  BP: 124/65  SpO2: 96 %  Body mass index is 17.87 kg/m².    Current medications reviewed.  Hospital Medications:   [Transfer Hold] potassium chloride 40 mEq in 250mL sodium chloride 0.9% IVPB premix  40 mEq Intravenous Once    piperacillin-tazobactam (Zosyn) 3.375 g in dextrose 5% 100 mL IVPB-ADDV  3.375 g Intravenous Q8H    [Transfer Hold] magnesium sulfate in sterile water for injection 2 g/50mL IVPB premix 2 g  2 g Intravenous Once    dextrose 10% infusion (TPN no rate)   Intravenous Continuous PRN    adult 3 in 1 TPN   Intravenous Continuous TPN    [COMPLETED] potassium chloride 40 mEq in 250mL sodium chloride 0.9% IVPB premix  40 mEq Intravenous Once    [Transfer Hold] levothyroxine (Synthroid) tab 50 mcg  50 mcg Oral Before breakfast    [Transfer Hold] sertraline (Zoloft) tab 100 mg  100 mg Oral Nightly    [COMPLETED] potassium chloride 40 mEq in 250mL sodium chloride 0.9% IVPB premix  40 mEq Intravenous Once    [COMPLETED] potassium chloride 40 mEq in 250mL sodium chloride 0.9% IVPB premix  40 mEq Intravenous Once    [COMPLETED] potassium chloride 20 mEq/100mL IVPB premix 20 mEq  20 mEq Intravenous Once    lactated ringers infusion   Intravenous Continuous    [COMPLETED] potassium chloride 40 mEq in 250mL sodium chloride 0.9% IVPB premix  40 mEq Intravenous Once    [Transfer Hold] benzocaine-menthol (Cepacol) lozenge 1 lozenge  1 lozenge Oral PRN     [Transfer Hold] pantoprazole (Protonix) 40 mg in sodium chloride 0.9% PF 10 mL IV push  40 mg Intravenous Q24H    [Transfer Hold] phenol (Chloraseptic) 1.4 % oral liquid spray   Oral Q2H PRN    [Transfer Hold] acetaminophen (Ofirmev) 10 mg/mL infusion premix 700 mg  15 mg/kg Intravenous Q8H PRN    [Transfer Hold] heparin (Porcine) 5000 UNIT/ML injection 5,000 Units  5,000 Units Subcutaneous 2 times per day    [COMPLETED] ondansetron (Zofran) 4 MG/2ML injection 4 mg  4 mg Intravenous Once    [COMPLETED] sodium chloride 0.9 % IV bolus 1,000 mL  1,000 mL Intravenous Once    [COMPLETED] LORazepam (Ativan) 2 mg/mL injection 0.5 mg  0.5 mg Intravenous Once    [COMPLETED] benzocaine (Hurricaine/Topex) 20 % mouth spray 1 spray  1 spray Mouth/Throat Once    [COMPLETED] Lidocaine Viscous HCl (XYLOCAINE) 2 % mouth solution 10 mL  10 mL Mouth/Throat Once    [COMPLETED] LORazepam (Ativan) 2 mg/mL injection 0.5 mg  0.5 mg Intravenous Once    [Transfer Hold] morphINE PF 2 MG/ML injection 1 mg  1 mg Intravenous Q2H PRN    Or    [Transfer Hold] morphINE PF 2 MG/ML injection 2 mg  2 mg Intravenous Q2H PRN    Or    [Transfer Hold] morphINE PF 4 MG/ML injection 4 mg  4 mg Intravenous Q2H PRN    [Transfer Hold] ondansetron (Zofran) 4 MG/2ML injection 4 mg  4 mg Intravenous Q6H PRN    [Transfer Hold] metoclopramide (Reglan) 5 mg/mL injection 5 mg  5 mg Intravenous Q8H PRN       Outpatient Medications:     Medications Prior to Admission   Medication Sig Dispense Refill Last Dose    atorvastatin 20 MG Oral Tab Take 1 tablet (20 mg total) by mouth nightly. 90 tablet 3 7/20/2024    levothyroxine 50 MCG Oral Tab Take 1 tablet (50 mcg total) by mouth daily. ON AN EMPTY STOMACH 90 tablet 3 7/21/2024    ASPIRIN 81 OR Take by mouth as needed (Taking as needed for sleep).   7/20/2024    Calcium Carbonate 1500 (600 Ca) MG Oral Tab TAKE 1 TABLET DAILY.   7/21/2024    Calcium-Vitamin D (CALTRATE 600 PLUS-VIT D OR) Take  by mouth.   7/21/2024     Cholecalciferol (VITAMIN D3) 1000 UNITS Oral Cap Take  by mouth daily.   2024    CENTRUM OR 1TABLET DAILY   2024    sertraline 50 MG Oral Tab Take 2.5 tablets (125 mg total) by mouth daily. 225 tablet 1     Meclizine HCl 25 MG Oral Tab Take 1 tablet (25 mg total) by mouth 3 (three) times daily as needed for Dizziness. 30 tablet 1 Unknown       Allergies: Latex and Latex      Anesthesia Evaluation        Anesthetic Complications  (-) history of anesthetic complications         GI/Hepatic/Renal                                 Cardiovascular      ECG reviewed.        Unable to assess MET.       (+) hyperlipidemia                                  Endo/Other                                  Pulmonary                           Neuro/Psych                                      Past Surgical History:   Procedure Laterality Date    Cholecystectomy      Hemorrhoidectomy      Laparoscopic cholecystectomy N/A 2015    Procedure: LAPAROSCOPIC CHOLECYSTECTOMY;  Surgeon: Toney Rouse MD;  Location: [facility] MAIN OR    Other      thyroid removed    Other surgical history  11    cysto, Dr. Sandhu    Other surgical history  08/10/2021    ANAL CANAL POLYP - [facility]     Removal gallbladder      Removal of tonsils,12+ y/o       Social History     Socioeconomic History    Marital status:    Tobacco Use    Smoking status: Former     Current packs/day: 0.00     Types: Cigarettes     Quit date: 1955     Years since quittin.0    Smokeless tobacco: Never   Vaping Use    Vaping status: Never Used   Substance and Sexual Activity    Alcohol use: No    Drug use: No     History   Drug Use No     Available pre-op labs reviewed.  Lab Results   Component Value Date    WBC 14.8 (H) 2024    RBC 5.29 2024    HGB 15.1 2024    HCT 44.2 2024    MCV 83.6 2024    MCH 28.5 2024    MCHC 34.2 2024    RDW 13.7 2024    .0 2024     Lab Results   Component Value Date      08/01/2024    K 3.4 (L) 08/01/2024     08/01/2024    CO2 25.0 08/01/2024    BUN 7 (L) 08/01/2024    CREATSERUM 0.59 08/01/2024     (H) 08/01/2024    CA 9.0 08/01/2024            Airway             Cardiovascular    Cardiovascular exam normal.         Dental    Dentition appears grossly intact         Pulmonary      Breath sounds clear to auscultation bilaterally.               Other findings              ASA: 2   Plan: general  NPO status verified and     Post-procedure pain management plan discussed with surgeon and patient.      Plan/risks discussed with: patient and child/children            No significant PMHx except HL. Previous anesthesia without complications. Denies N/V. Has NG in place. Given the amount of NG residual, will plan for GETA with RSI. TAP block will be planned per the surgeon's request.   Risks were discussed with the daughter (POA) and she agreed on the plan and signed a consent.     Present on Admission:  **None**

## 2024-08-01 NOTE — BRIEF OP NOTE
Pre-Operative Diagnosis: PARTIAL SMALL BOWEL OBSTRUCTION     Post-Operative Diagnosis: PARTIAL SMALL BOWEL OBSTRUCTION      Procedure Performed:   EXPLORATORY LAPAROTOMY WITH LYSIS OF ADHESIONS    Surgeons and Role:     * Ирина Rodrigues MD - Primary    Assistant(s):  PA: Jocelyne Varner PA     Surgical Findings: INTERNAL HERNIA FROM SMALL BOWEL TO SMALL BOWEL AND TO MESENTERY DENSE ADHESION     Specimen: NONE     Estimated Blood Loss: Blood Output: 2 mL (8/1/2024  3:58 PM)    Dictation Number:  NA    Ирина Rodrigues MD  8/1/2024  3:59 PM

## 2024-08-01 NOTE — ANESTHESIA PROCEDURE NOTES
Regional Block    Performed by: Chi Conway MD  Authorized by: Chi Conway MD      General Information and Staff    Start Time:   Anesthesiologist:  Chi Cnoway MD  Performed by:  Anesthesiologist  Patient Location:  OR      Site Identification: real time ultrasound guided and image stored and retrievable    Block site/laterality marked before start: site marked  Reason for Block: at surgeon's request and post-op pain management    Preanesthetic Checklist: 2 patient identifers, IV checked, risks and benefits discussed, monitors and equipment checked, pre-op evaluation, timeout performed, anesthesia consent, sterile technique used, no prohibitive neurological deficits and no local skin infection at insertion site      Procedure Details    Patient Position:  Supine  Prep: ChloraPrep    Monitoring:  Cardiac monitor, continuous pulse ox and blood pressure cuff  Block Type:  TAP  Laterality:  Bilateral  Injection Technique:  Single-shot    Needle    Needle Type:  Short-bevel and echogenic  Needle Length:  50 mm  Needle Localization:  Ultrasound guidance  Reason for Ultrasound Use: appropriate spread of the medication was noted in real time and no ultrasound evidence of intravascular and/or intraneural injection            Assessment    Injection Assessment:  Good spread noted, negative resistance, negative aspiration for heme, incremental injection and low pressure  Heart Rate Change: No    - Patient tolerated block procedure well without evidence of immediate block related complications.     Medications      Additional Comments    Medication:  Bupivacaine 0.25% 25mL (split for both sides)

## 2024-08-02 LAB
ALBUMIN SERPL-MCNC: 2.8 G/DL (ref 3.2–4.8)
ALBUMIN/GLOB SERPL: 1.6 {RATIO} (ref 1–2)
ALP LIVER SERPL-CCNC: 52 U/L
ALT SERPL-CCNC: 12 U/L
ANION GAP SERPL CALC-SCNC: 5 MMOL/L (ref 0–18)
AST SERPL-CCNC: 10 U/L (ref ?–34)
BASOPHILS # BLD AUTO: 0.03 X10(3) UL (ref 0–0.2)
BASOPHILS NFR BLD AUTO: 0.3 %
BILIRUB SERPL-MCNC: 0.3 MG/DL (ref 0.2–1.1)
BUN BLD-MCNC: 8 MG/DL (ref 9–23)
CALCIUM BLD-MCNC: 7.7 MG/DL (ref 8.7–10.4)
CHLORIDE SERPL-SCNC: 111 MMOL/L (ref 98–112)
CO2 SERPL-SCNC: 25 MMOL/L (ref 21–32)
CREAT BLD-MCNC: 0.52 MG/DL
EGFRCR SERPLBLD CKD-EPI 2021: 89 ML/MIN/1.73M2 (ref 60–?)
EOSINOPHIL # BLD AUTO: 0.08 X10(3) UL (ref 0–0.7)
EOSINOPHIL NFR BLD AUTO: 0.9 %
ERYTHROCYTE [DISTWIDTH] IN BLOOD BY AUTOMATED COUNT: 14 %
GLOBULIN PLAS-MCNC: 1.8 G/DL (ref 2–3.5)
GLUCOSE BLD-MCNC: 132 MG/DL (ref 70–99)
GLUCOSE BLD-MCNC: 141 MG/DL (ref 70–99)
GLUCOSE BLD-MCNC: 157 MG/DL (ref 70–99)
GLUCOSE BLD-MCNC: 157 MG/DL (ref 70–99)
HCT VFR BLD AUTO: 39.3 %
HGB BLD-MCNC: 13 G/DL
IMM GRANULOCYTES # BLD AUTO: 0.09 X10(3) UL (ref 0–1)
IMM GRANULOCYTES NFR BLD: 1 %
LYMPHOCYTES # BLD AUTO: 0.69 X10(3) UL (ref 1–4)
LYMPHOCYTES NFR BLD AUTO: 7.5 %
MAGNESIUM SERPL-MCNC: 1.7 MG/DL (ref 1.6–2.6)
MCH RBC QN AUTO: 28.5 PG (ref 26–34)
MCHC RBC AUTO-ENTMCNC: 33.1 G/DL (ref 31–37)
MCV RBC AUTO: 86.2 FL
MONOCYTES # BLD AUTO: 0.65 X10(3) UL (ref 0.1–1)
MONOCYTES NFR BLD AUTO: 7.1 %
NEUTROPHILS # BLD AUTO: 7.67 X10 (3) UL (ref 1.5–7.7)
NEUTROPHILS # BLD AUTO: 7.67 X10(3) UL (ref 1.5–7.7)
NEUTROPHILS NFR BLD AUTO: 83.2 %
OSMOLALITY SERPL CALC.SUM OF ELEC: 294 MOSM/KG (ref 275–295)
PHOSPHATE SERPL-MCNC: 2.8 MG/DL (ref 2.4–5.1)
PLATELET # BLD AUTO: 256 10(3)UL (ref 150–450)
POTASSIUM SERPL-SCNC: 4.3 MMOL/L (ref 3.5–5.1)
POTASSIUM SERPL-SCNC: 4.3 MMOL/L (ref 3.5–5.1)
PROT SERPL-MCNC: 4.6 G/DL (ref 5.7–8.2)
RBC # BLD AUTO: 4.56 X10(6)UL
SODIUM SERPL-SCNC: 141 MMOL/L (ref 136–145)
TRIGL SERPL-MCNC: 113 MG/DL (ref 30–149)
WBC # BLD AUTO: 9.2 X10(3) UL (ref 4–11)

## 2024-08-02 PROCEDURE — 85025 COMPLETE CBC W/AUTO DIFF WBC: CPT | Performed by: SURGERY

## 2024-08-02 PROCEDURE — 83735 ASSAY OF MAGNESIUM: CPT | Performed by: SURGERY

## 2024-08-02 PROCEDURE — S0028 INJECTION, FAMOTIDINE, 20 MG: HCPCS | Performed by: SURGERY

## 2024-08-02 PROCEDURE — 84478 ASSAY OF TRIGLYCERIDES: CPT | Performed by: SURGERY

## 2024-08-02 PROCEDURE — 84100 ASSAY OF PHOSPHORUS: CPT | Performed by: SURGERY

## 2024-08-02 PROCEDURE — 84132 ASSAY OF SERUM POTASSIUM: CPT | Performed by: SURGERY

## 2024-08-02 PROCEDURE — 80053 COMPREHEN METABOLIC PANEL: CPT | Performed by: SURGERY

## 2024-08-02 PROCEDURE — 82962 GLUCOSE BLOOD TEST: CPT

## 2024-08-02 NOTE — PROGRESS NOTES
Alert and oriented X 3 forgetful at times. VSS. RA.  Tele NSR.  Abdomen is soft non distended. Patient having pain at catheter site of mauricio. Mauricio catheter is positional.   Mauricio urine output is low. Hospitalist notified. Monitoring.   NG tube Low cont suction. Minimal output. 55 NGT CDI  PN infusing per order.   Diet: NPO ice chips, sips w meds.   IVF infusing.   Call light in reach.

## 2024-08-02 NOTE — PLAN OF CARE
A&Ox3. VSS. RA. . Denies chest pain and SOB.   Telemetry: NSR.   GI: Abdomen soft, nondistended.  Denies Passing gas. NGT to LIS at 55cm  Denies nausea.   : Mi in place/ draining roni urine  Denies pain during shift  Up with standby assist/walker  Drains: Mi in place/ NGT to LIS   Incisions: Midline with aquacel- scant drainage on dressing  Diet: NPO with ice chips  IVF running per order.   Continuous TPN started today at 70.8ml/hr  All appropriate safety measures in place. All questions and concerns addressed.

## 2024-08-02 NOTE — PHYSICAL THERAPY NOTE
Attempted to see pt for PT. Chart reviewed and RN was consulted, per RN pt is irritable and not appropriate for PT session due to NGT to LIS and TPN. Will follow up at a later date.

## 2024-08-02 NOTE — DIETARY NOTE
[facility]   part of [facility]   CLINICAL NUTRITION - TPN FOLLOW UP    Catherine Bustamante     PN order for tonight to provide: Infused via peripheral line.  575 dextrose calories, 350 lipid calories (29% lipids), 65 grams protein in 2000mL fluid. Provides 1185 kcal, meeting ~ 74% of goal  nutrition    Consider PICC placement. Unable to meet nutrition goal due to peripheral access.     TPN goal: 800 dextrose calories, 480 lipid calories (30% lipids), 80 grams protein in 1700mL fluid. Provides 1600 kcal.        Intake/Output Summary (Last 24 hours) at 8/2/2024 1155  Last data filed at 8/2/2024 0826  Gross per 24 hour   Intake 500 ml   Output 1062 ml   Net -562 ml       Labs:   Recent Labs   Lab 08/01/24  0614 08/02/24  0527   * 157*    141   K 3.4* 4.3  4.3    111   CO2 25.0 25.0   BUN 7* 8*   CREATSERUM 0.59 0.52*   CA 9.0 7.7*   PHOS 2.7 2.8   MG 1.8 1.7   ALKPHO  --  52*   AST  --  10   ALT  --  12   BILT  --  0.3   TRIG  --  113       Glucose POC last 24hr (100-157)    POD 1 Exploratory laparotomy and lysis of adhesion.   Electrolytes adjusted based on today's labs.   NGT to JEZ     RD will write TPN daily and follow per protocol. See full assessment 8/6.    Pt is at high nutrition risk.    Ambika Tai RD, LDN  Clinical Nutrition  [id number]

## 2024-08-02 NOTE — PROGRESS NOTES
[facility] Hospitalist Progress Note    Subjective:  Patient seen at bedside.  No overnight events.  Complaining of abdominal pain upon palpation.  NG tube in place.  No postop BM yet.    Review of Systems  Comprehensive ROS reviewed and negative except for what is stated in HPI.      OBJECTIVE:  /64 (BP Location: Left arm)   Pulse 81   Temp 98.2 °F (36.8 °C) (Oral)   Resp 18   Ht 5' 3\" (1.6 m)   Wt 104 lb 1.6 oz (47.2 kg)   SpO2 95%   BMI 18.44 kg/m²   General: No apparent distress.  Alert and oriented.  HEENT:  EOMI, PERRLA,   Pulm: Clear breath sounds bilaterally.  Normal respiratory effort.  CV: Regular rate and rhythm, no murmur.   Abd: Soft, mild tenderness upon palpation.  No rebound or guarding.  MSK: Full range of motion in extremities, no obvious deformities.    Skin: No lesions or rashes.  Neuro: No obvious focal deficits.    LABS:   Lab Results   Component Value Date    WBC 9.2 08/02/2024    HGB 13.0 08/02/2024    HCT 39.3 08/02/2024    .0 08/02/2024    CREATSERUM 0.52 08/02/2024    BUN 8 08/02/2024     08/02/2024    K 4.3 08/02/2024    K 4.3 08/02/2024     08/02/2024    CO2 25.0 08/02/2024     08/02/2024    CA 7.7 08/02/2024    ALB 2.8 08/02/2024    ALKPHO 52 08/02/2024    BILT 0.3 08/02/2024    TP 4.6 08/02/2024    AST 10 08/02/2024    ALT 12 08/02/2024    MG 1.7 08/02/2024    PHOS 2.8 08/02/2024    PGLU 141 08/02/2024       All lab work and imaging personally reviewed.    Assessment/ Plan:   Patient is a 89 year old female with history of multiple SBO s/p ex lap with lysis of adhesions, polycythemia, hypothyroidism, schizoaffective disorder, hypertension, hyperlipidemia presented for abdominal distention and emesis found to have SBO.    #SBO s/p ex lap with adhesion lysis   -Imaging and labs reviewed.  -Surgery following.  Minimal NGT output, monitor flatus and postop BM.   -Currently tolerating TPN.  -Zosyn empirically (day 2) due to initial leukocytosis.  Remains  afebrile.  -Encourage early mobilization.    #WAYNE, resolved  -Daily BMP.  Gentle IVF, advance PO diet per surgery recs.    #Hypothyroidism  #Hyperlipidemia  #MDD  -Cont home Synthroid and sertraline    DVT ppx: heparin subcu   Diet: NGT  Disposition: Likely C  Code status: Full    Outpatient records or previous hospital records reviewed.   [facility] hospitalist to continue to follow patient while in house.    Max Lipscomb DO  [facility] Center[facility]

## 2024-08-02 NOTE — OPERATIVE REPORT
[facility]    PATIENT'S NAME: KATHLEEN RAYA   ATTENDING PHYSICIAN: Raffi Brown DO   OPERATING PHYSICIAN: Ирина Rodrigues M.D.   PATIENT ACCOUNT#:   [id number]    LOCATION:  [address] [place] [address]  MEDICAL RECORD #:   [id number]       YOB: 1935  ADMISSION DATE:       07/21/2024      OPERATION DATE:  08/01/2024    OPERATIVE REPORT    PREOPERATIVE DIAGNOSIS:  Partial small-bowel obstruction.  POSTOPERATIVE DIAGNOSIS:  Partial small-bowel obstruction.  PROCEDURE:  Exploratory laparotomy and lysis of adhesion.    ASSISTANT:  Jocelyne Varner PA-C    ANESTHESIA:  General.    ESTIMATED BLOOD LOSS:  2 mL.    SPECIMEN:  None.    DISPOSITION:  To PACU.    COMPLICATIONS:  None.    INDICATIONS:  Patient is an 89-year-old female who presented to the emergency room with an abdominal pain and bloating about 1 week ago.  She was admitted with the NG tube, and conservative measure was taken including performing a small bowel follow-through which showed the contrast moving into the colon.  However, the patient's NG output has been intermittent from low to high and at this point, the decision was made to explore since the output has not been consistently low.  The patient had several years ago exploratory laparotomy and lysis of adhesion.  So I discussed the operation with the patient and the patient's daughter and both agreed and signed the informed consent.    FINDINGS:  Internal hernia from small bowel to small bowel and 2 mesentery dense adhesions.    OPERATIVE TECHNIQUE:  Patient was brought to the operating room, was placed in supine position on the operating table.  Lower extremity SCD boots were placed.  After IV sedation and endotracheal intubation, a Mi catheter was inserted then abdomen was prepped into a sterile field.  Then previous midline incision was reopened using a 10 blade.  Electric Bovie cautery was used to separate the subcutaneous tissue and entered through the linea alba into the abdominal  cavity.  The proximal bowel was dilated, the distal bowel was decompressed and as the small bowel was inspected from the ligament of Treitz in the mid jejunum, there was a twisting of the bowel and there was bowel-to-bowel adhesion and also bowel-to-mesentery adhesion where there was an internal hernia.  There was no ischemia of the bowel and appeared to be intermittent in nature.  So the adhesions were released using the Metzenbaum and then the defect was reapproximated using 3-0 Vicryl placing seromuscular stitches on the mesentery as well as the bowel and then after inspecting the entire bowel, there were no additional issues.  At this time, the bowel was placed back into the abdomen.  Saline solution was used.  The area was copiously irrigated.  Good hemostasis noted, and then the omentum was placed on top and then using a looped PDS, the fascia was reapproximated and then staples for the skin, then placing the dressing.  The patient tolerated the procedure well, was extubated in the operating room, and transferred to PACU in stable condition.  At the end of the case, the needle, instrument, and sponge counts were all correct.  At the end of the procedure, the anesthesiologist performed a TAP block for local pain control.  The surgical assistant was medically necessary for the entire procedure to position patient, prep the area, drape, retract the wound, and to inspect the small bowel and assist in closing and transferring patient out of the operating room.    Dictated By Ирина Rodrigues M.D.  d: 08/01/2024 16:04:12  t: 08/01/2024 23:32:55  Job [id number]  TL/

## 2024-08-03 ENCOUNTER — APPOINTMENT (OUTPATIENT)
Facility: HOSPITAL | Age: 89
End: 2024-08-03
Attending: STUDENT IN AN ORGANIZED HEALTH CARE EDUCATION/TRAINING PROGRAM
Payer: MEDICARE

## 2024-08-03 LAB
ANION GAP SERPL CALC-SCNC: 4 MMOL/L (ref 0–18)
BUN BLD-MCNC: 10 MG/DL (ref 9–23)
CALCIUM BLD-MCNC: 7.7 MG/DL (ref 8.7–10.4)
CHLORIDE SERPL-SCNC: 111 MMOL/L (ref 98–112)
CO2 SERPL-SCNC: 27 MMOL/L (ref 21–32)
CREAT BLD-MCNC: 0.53 MG/DL
EGFRCR SERPLBLD CKD-EPI 2021: 88 ML/MIN/1.73M2 (ref 60–?)
GLUCOSE BLD-MCNC: 126 MG/DL (ref 70–99)
GLUCOSE BLD-MCNC: 134 MG/DL (ref 70–99)
GLUCOSE BLD-MCNC: 138 MG/DL (ref 70–99)
GLUCOSE BLD-MCNC: 162 MG/DL (ref 70–99)
GLUCOSE BLD-MCNC: 92 MG/DL (ref 70–99)
MAGNESIUM SERPL-MCNC: 2.1 MG/DL (ref 1.6–2.6)
OSMOLALITY SERPL CALC.SUM OF ELEC: 295 MOSM/KG (ref 275–295)
PHOSPHATE SERPL-MCNC: 2.1 MG/DL (ref 2.4–5.1)
POTASSIUM SERPL-SCNC: 4.7 MMOL/L (ref 3.5–5.1)
SODIUM SERPL-SCNC: 142 MMOL/L (ref 136–145)

## 2024-08-03 PROCEDURE — S0028 INJECTION, FAMOTIDINE, 20 MG: HCPCS | Performed by: SURGERY

## 2024-08-03 PROCEDURE — 80048 BASIC METABOLIC PNL TOTAL CA: CPT | Performed by: SURGERY

## 2024-08-03 PROCEDURE — 83735 ASSAY OF MAGNESIUM: CPT | Performed by: SURGERY

## 2024-08-03 PROCEDURE — 97110 THERAPEUTIC EXERCISES: CPT

## 2024-08-03 PROCEDURE — 84100 ASSAY OF PHOSPHORUS: CPT | Performed by: SURGERY

## 2024-08-03 PROCEDURE — 36410 VNPNXR 3YR/> PHY/QHP DX/THER: CPT

## 2024-08-03 PROCEDURE — 82962 GLUCOSE BLOOD TEST: CPT

## 2024-08-03 PROCEDURE — 05HC33Z INSERTION OF INFUSION DEVICE INTO LEFT BASILIC VEIN, PERCUTANEOUS APPROACH: ICD-10-PCS | Performed by: STUDENT IN AN ORGANIZED HEALTH CARE EDUCATION/TRAINING PROGRAM

## 2024-08-03 RX ORDER — KETOROLAC TROMETHAMINE 15 MG/ML
15 INJECTION, SOLUTION INTRAMUSCULAR; INTRAVENOUS EVERY 6 HOURS PRN
Status: DISPENSED | OUTPATIENT
Start: 2024-08-03 | End: 2024-08-04

## 2024-08-03 NOTE — PROGRESS NOTES
[facility]  Progress Note    Catherine Bustamante Patient Status:  Inpatient    1935 MRN [id number]   Location [facility] 3NW-A Attending Raffi Brown,    Hosp Day # 13 PCP Flynn Polk MD     Subjective:  2 bowel movements reported last night.  Patient is comfortable.    Objective/Physical Exam:  General: Alert, orientated x3.  Cooperative.  No apparent distress.  Vital Signs:  Blood pressure 117/61, pulse 74, temperature 97.8 °F (36.6 °C), temperature source Oral, resp. rate 18, height 5' 3\" (1.6 m), weight 103 lb 12.8 oz (47.1 kg), SpO2 95%, not currently breastfeeding.  HEENT: Exam is unremarkable.  Without scleral icterus.  Mucous membranes are moist. Oropharynx is clear.  Lungs: Clear to auscultation bilaterally.  Cardiac: Regular rate and rhythm. No murmur.  Abdomen: Soft minimal distention wound okay  Extremities:  No lower extremity edema noted.  Without clubbing or cyanosis.    Skin: Normal texture and turgor.  Neurologic: Cranial nerves are grossly intact.  Motor strength and sensory examination is grossly normal.  No focal neurologic deficit.    Labs:  Lab Results   Component Value Date    CREATSERUM 0.53 2024    BUN 10 2024     2024    K 4.7 2024     2024    CO2 27.0 2024     2024    CA 7.7 2024    MG 2.1 2024    PHOS 2.1 2024    PGLU 134 2024       Assessment/Plan:  GI tract appears patent.  Will clamp the NG tube today.  Start clear liquids tonight if she tolerates.  Continue hyperalimentation for now.    Serge Chi MD  8/3/2024  9:10 AM

## 2024-08-03 NOTE — VASCULAR ACCESS
VAT Consult for PICC line insertion for TPN. I spoke with Savannah WEN and then talked over the phone with the Pt's son Brandon Bustamante to obtain consent. He did not want to give consent for PICC line placement until he spoke with the surgeon to discuss plan of care. He thought the plan was to attempt clear liquids today and progress as tolerated; hoping to not need TPN or PICC line. Brandon and/or his sister are requesting that Dr Chi or someone from surgery come to his mom's room or call him to talk and get questions answered. I contacted Dr Chi from surgery regarding this issue and also informed him of the Pt's and her family's request. Dr Chi said that he did not want a PICC line placed at this time, as she may not need TPN. Savannah WEN notified.  VAT consult changed to Midline placement for poor venous access. 18g x 10cm Midline placed in left upper arm without difficulty and tolerated well by the patient. Savannah WEN notified that the Pt's right hand/wrist PIV is hurting and pink, puffy; requested removal.   Dana Vernon RN VAT

## 2024-08-03 NOTE — PLAN OF CARE
Pt a&o x 3.   Forgetful @ times  Son visiting this am.  Participating & helpful with plan of care  NGT clamped since 0945  Will check residuals, as ordered  Pt c/o pain this am.  Morphine given w/ relief  Up to chair & bathroom w/ sba x 1   Dtv @ 1715  Ppn infusinv via left midline  Plan of care:  if residuals < 150,  will remove ngt & start cld

## 2024-08-03 NOTE — PROGRESS NOTES
[facility] Hospitalist Progress Note    Subjective:  Patient seen at bedside.  No overnight events.  Complaining of right arm swelling due to IVs.  She is passing flatus and had 1 bowel movement.      Review of Systems  Comprehensive ROS reviewed and negative except for what is stated in HPI.      OBJECTIVE:  /59 (BP Location: Left leg)   Pulse 75   Temp 97.6 °F (36.4 °C) (Oral)   Resp 18   Ht 5' 3\" (1.6 m)   Wt 103 lb 12.8 oz (47.1 kg)   SpO2 95%   BMI 18.39 kg/m²   General: No apparent distress.  Alert and oriented.  HEENT: NG tube in place.  Pulm: Clear breath sounds bilaterally.  Normal respiratory effort.  CV: Regular rate and rhythm, no murmur.   Abd: Soft, mild tenderness upon palpation.  No rebound or guarding.  MSK: Full range of motion in extremities, no obvious deformities.    Skin: No lesions or rashes.  Neuro: No obvious focal deficits.    LABS:   Lab Results   Component Value Date    CREATSERUM 0.53 08/03/2024    BUN 10 08/03/2024     08/03/2024    K 4.7 08/03/2024     08/03/2024    CO2 27.0 08/03/2024     08/03/2024    CA 7.7 08/03/2024    MG 2.1 08/03/2024    PHOS 2.1 08/03/2024    PGLU 138 08/03/2024       All lab work and imaging personally reviewed.    Assessment/ Plan:   Patient is a 89 year old female with history of multiple SBO s/p ex lap with lysis of adhesions, polycythemia, hypothyroidism, schizoaffective disorder, hypertension, hyperlipidemia presented for abdominal distention and emesis found to have SBO.    #SBO s/p ex lap with adhesion lysis   -Imaging and labs reviewed.  -Surgery following.  Plan to clamp NGT and start CLD tonight.  -Zosyn empirically (day 3) due to initial leukocytosis.  Remains afebrile, will likely discontinue tomorrow.  -Encourage early mobilization and multimodal pain control.    #WAYNE, resolved  -Daily BMP.  Plan to advance to PO diet today.    #Hypothyroidism  #Hyperlipidemia  #MDD  -Cont home Synthroid and sertraline    DVT ppx: heparin  subcu   Diet: NGT  Disposition: Likely [facility]  Code status: Full    MaxDO Man MukherjeeFormerly [facility]

## 2024-08-03 NOTE — DIETARY NOTE
[facility]   part of [facility]   CLINICAL NUTRITION - TPN FOLLOW UP    Catherinecristina Bustamante     Parenteral Nutrition- via PIV    *Tonight's PPN to provide: 570 dextrose calories, 400 SMOF lipid calories (33% lipids), 65 gm protein, and Total Calories: 1230 kcal (~77% of goal) in 2000 ml.    Osmolarity: 897.9 mOsm/L (suitable for peripheral infusion).  Unable to meet goal nutrition d/t osmolarity restriction w/ PPN.   Consider PICC line placement if diet is unable to advance.    *Goal PN to provide: 800 dextrose calories, 480 SMOF lipid calories (30% lipids), 80 gm protein, and Total Calories:1600 kcal.       Labs:   Recent Labs   Lab 08/02/24  0527 08/03/24  0559   * 126*    142   K 4.3  4.3 4.7    111   CO2 25.0 27.0   BUN 8* 10   CREATSERUM 0.52* 0.53*   CA 7.7* 7.7*   PHOS 2.8 2.1*   MG 1.7 2.1   ALKPHO 52*  --    AST 10  --    ALT 12  --    BILT 0.3  --    TRIG 113  --      Glucose POC last 24hr (132-162).    Electrolytes adjusted based on today's labs.   Riders:NaPhos:15 mmol  Additional IVF:Kcl 20 meq in NS at 75 ml/hr (1000 ml).  Remains NPO.  Plans for clamping NG tube today and possible diet advancement to Clear Liquid tonight, if pt tolerates per Surgery note.     RD will write PPN daily and follow per protocol. See full assessment 8/1.    Pt is at high nutrition risk.    Barbie Templeton MS, RD, LDN  Clinical Dietitian  Ext:[phone]

## 2024-08-03 NOTE — PHYSICAL THERAPY NOTE
PHYSICAL THERAPY TREATMENT NOTE - INPATIENT    Room Number: 304/304-A     Session: 4     Number of Visits to Meet Established Goals: 4    Presenting Problem: SBO  Co-Morbidities : vertigo, HLD, SBO    ASSESSMENT   Patient demonstrates fair progress this session, goals remain in progress.    Patient continues to function near baseline with bed mobility, transfers, gait, and stair negotiation. Contributing factors to remaining limitations include decreased functional strength, decreased endurance/aerobic capacity, pain, impaired seated/standing balance, and decreased muscular endurance.  Next session anticipate patient to progress bed mobility, transfers, gait, and stair negotiation.  Physical Therapy will continue to follow patient for duration of hospitalization.    Patient continues to benefit from continued skilled PT services: at discharge to promote prior level of function and safety with additional support and return home with home health PT.    PLAN  PT Treatment Plan: Bed mobility;Body mechanics;Endurance;Energy conservation;Gait training;Stair training;Transfer training;Balance training;Neuromuscular re-educate;Strengthening;Family education;Patient education  Rehab Potential : Good  Frequency (Obs): 3x/week    CURRENT GOALS       Goal #1 Patient is able to demonstrate supine - sit EOB @ level: supervision   MET   Goal #2 Patient is able to demonstrate transfers Sit to/from Stand at assistance level: supervision- MET      Goal #3 Patient is able to ambulate 250 feet with assist device:  LRAD  at assistance level: supervision      Goal #4     Goal #5     Goal #6       8/3/2024 all goals ongoing    SUBJECTIVE  \"I'm just tired\"    OBJECTIVE  Precautions: Bed/chair alarm;NG suction    WEIGHT BEARING RESTRICTION  Weight Bearing Restriction: None                PAIN ASSESSMENT   Ratin  Location: pt denies pain  Management Techniques: Activity promotion;Repositioning;Relaxation    BALANCE                                                                                                                        Static Sitting: Fair +  Dynamic Sitting: Fair +           Static Standing: Fair -  Dynamic Standing: Fair -    ACTIVITY TOLERANCE                         O2 WALK         AM-PAC '6-Clicks' INPATIENT SHORT FORM - BASIC MOBILITY  How much difficulty does the patient currently have...  Patient Difficulty: Turning over in bed (including adjusting bedclothes, sheets and blankets)?: None   Patient Difficulty: Sitting down on and standing up from a chair with arms (e.g., wheelchair, bedside commode, etc.): None   Patient Difficulty: Moving from lying on back to sitting on the side of the bed?: A Little   How much help from another person does the patient currently need...   Help from Another: Moving to and from a bed to a chair (including a wheelchair)?: A Little   Help from Another: Need to walk in hospital room?: A Little   Help from Another: Climbing 3-5 steps with a railing?: A Little       AM-PAC Score:  Raw Score: 20   Approx Degree of Impairment: 35.83%   Standardized Score (AM-PAC Scale): 47.67   CMS Modifier (G-Code): CJ    FUNCTIONAL ABILITY STATUS  Gait Assessment   Functional Mobility/Gait Assessment  Gait Assistance: Not tested  Distance (ft): 300  Assistive Device: Rolling walker  Pattern: Within Functional Limits (decreaased bruce)    Skilled Therapy Provided      Transfer Mobility:  Partial Sit>Stand: SBA for offloading purposes   Stand>Sit: SBA   Gait: NT    Therapist's Comments: pt greeted in bedside recliner. Pt reporting incr fatigue and politely requesting to not ambulate. Agreeable to chair level exercise and activity while seated in chair. Pt engaging in repositioning/offloading techniques, including lateral weight shifting and anterior/posterior weight shifting. Partial STS performed for repositioning, pt requiring SBA and demonstrating appropriate hand placement on arm rests for support. Cues provided  throughout therapeutic exercise for repetition and pacing.       THERAPEUTIC EXERCISES  Lower Extremity Alternating marching  Ankle pumps  LAQ     Upper Extremity Elbow flex/ext and Wrist flex/ext     Position Sitting     Repetitions   10   Sets   1     Patient End of Session: Up in chair;Needs met;Call light within reach;RN aware of session/findings;All patient questions and concerns addressed;Alarm set    PT Session Time: 18 minutes  Therapeutic Exercise: 18 minutes

## 2024-08-04 LAB
ANION GAP SERPL CALC-SCNC: 4 MMOL/L (ref 0–18)
BASOPHILS # BLD AUTO: 0.03 X10(3) UL (ref 0–0.2)
BASOPHILS NFR BLD AUTO: 0.5 %
BUN BLD-MCNC: 10 MG/DL (ref 9–23)
C DIFF TOX B STL QL: NEGATIVE
CALCIUM BLD-MCNC: 8 MG/DL (ref 8.7–10.4)
CHLORIDE SERPL-SCNC: 109 MMOL/L (ref 98–112)
CO2 SERPL-SCNC: 25 MMOL/L (ref 21–32)
CREAT BLD-MCNC: 0.47 MG/DL
EGFRCR SERPLBLD CKD-EPI 2021: 91 ML/MIN/1.73M2 (ref 60–?)
EOSINOPHIL # BLD AUTO: 0.09 X10(3) UL (ref 0–0.7)
EOSINOPHIL NFR BLD AUTO: 1.5 %
ERYTHROCYTE [DISTWIDTH] IN BLOOD BY AUTOMATED COUNT: 14.1 %
GLUCOSE BLD-MCNC: 108 MG/DL (ref 70–99)
GLUCOSE BLD-MCNC: 110 MG/DL (ref 70–99)
GLUCOSE BLD-MCNC: 119 MG/DL (ref 70–99)
GLUCOSE BLD-MCNC: 132 MG/DL (ref 70–99)
GLUCOSE BLD-MCNC: 148 MG/DL (ref 70–99)
HCT VFR BLD AUTO: 41.2 %
HGB BLD-MCNC: 13.8 G/DL
IMM GRANULOCYTES # BLD AUTO: 0.05 X10(3) UL (ref 0–1)
IMM GRANULOCYTES NFR BLD: 0.8 %
LYMPHOCYTES # BLD AUTO: 0.63 X10(3) UL (ref 1–4)
LYMPHOCYTES NFR BLD AUTO: 10.2 %
MAGNESIUM SERPL-MCNC: 2.3 MG/DL (ref 1.6–2.6)
MCH RBC QN AUTO: 28.3 PG (ref 26–34)
MCHC RBC AUTO-ENTMCNC: 33.5 G/DL (ref 31–37)
MCV RBC AUTO: 84.6 FL
MONOCYTES # BLD AUTO: 0.46 X10(3) UL (ref 0.1–1)
MONOCYTES NFR BLD AUTO: 7.4 %
NEUTROPHILS # BLD AUTO: 4.94 X10 (3) UL (ref 1.5–7.7)
NEUTROPHILS # BLD AUTO: 4.94 X10(3) UL (ref 1.5–7.7)
NEUTROPHILS NFR BLD AUTO: 79.6 %
OSMOLALITY SERPL CALC.SUM OF ELEC: 287 MOSM/KG (ref 275–295)
PHOSPHATE SERPL-MCNC: 2.9 MG/DL (ref 2.4–5.1)
PLATELET # BLD AUTO: 304 10(3)UL (ref 150–450)
POTASSIUM SERPL-SCNC: 4.2 MMOL/L (ref 3.5–5.1)
RBC # BLD AUTO: 4.87 X10(6)UL
SODIUM SERPL-SCNC: 138 MMOL/L (ref 136–145)
WBC # BLD AUTO: 6.2 X10(3) UL (ref 4–11)

## 2024-08-04 PROCEDURE — 85025 COMPLETE CBC W/AUTO DIFF WBC: CPT | Performed by: STUDENT IN AN ORGANIZED HEALTH CARE EDUCATION/TRAINING PROGRAM

## 2024-08-04 PROCEDURE — 82962 GLUCOSE BLOOD TEST: CPT

## 2024-08-04 PROCEDURE — S0028 INJECTION, FAMOTIDINE, 20 MG: HCPCS | Performed by: SURGERY

## 2024-08-04 PROCEDURE — 87493 C DIFF AMPLIFIED PROBE: CPT | Performed by: SURGERY

## 2024-08-04 PROCEDURE — 83735 ASSAY OF MAGNESIUM: CPT | Performed by: STUDENT IN AN ORGANIZED HEALTH CARE EDUCATION/TRAINING PROGRAM

## 2024-08-04 PROCEDURE — 84100 ASSAY OF PHOSPHORUS: CPT | Performed by: STUDENT IN AN ORGANIZED HEALTH CARE EDUCATION/TRAINING PROGRAM

## 2024-08-04 PROCEDURE — 80048 BASIC METABOLIC PNL TOTAL CA: CPT | Performed by: STUDENT IN AN ORGANIZED HEALTH CARE EDUCATION/TRAINING PROGRAM

## 2024-08-04 RX ORDER — SUCRALFATE ORAL 1 G/10ML
1 SUSPENSION ORAL
Status: DISCONTINUED | OUTPATIENT
Start: 2024-08-05 | End: 2024-08-05

## 2024-08-04 NOTE — PROGRESS NOTES
[facility] Hospitalist Progress Note    Subjective:  Patient seen at bedside.  No overnight events.  NGT removed yesterday, tolerated diet since dinner. Having normal BM.       Review of Systems  Comprehensive ROS reviewed and negative except for what is stated in HPI.      OBJECTIVE:  /73 (BP Location: Right arm)   Pulse 87   Temp 97.9 °F (36.6 °C) (Oral)   Resp 16   Ht 5' 3\" (1.6 m)   Wt 103 lb 12.8 oz (47.1 kg)   SpO2 95%   BMI 18.39 kg/m²   General: No apparent distress.  Alert and oriented.  HEENT: Mucous membranes moist.   Pulm: Clear breath sounds bilaterally.  Normal respiratory effort.  CV: Regular rate and rhythm, no murmur.   Abd: Soft, mild tenderness upon palpation.  No rebound or guarding.  MSK: Full range of motion in extremities, no obvious deformities.    Skin: No lesions or rashes.  Neuro: No obvious focal deficits.    LABS:   Lab Results   Component Value Date    WBC 6.2 08/04/2024    HGB 13.8 08/04/2024    HCT 41.2 08/04/2024    .0 08/04/2024    CREATSERUM 0.47 08/04/2024    BUN 10 08/04/2024     08/04/2024    K 4.2 08/04/2024     08/04/2024    CO2 25.0 08/04/2024     08/04/2024    CA 8.0 08/04/2024    MG 2.3 08/04/2024    PHOS 2.9 08/04/2024    PGLU 108 08/04/2024       All lab work and imaging personally reviewed.    Assessment/ Plan:   Patient is a 89 year old female with history of multiple SBO s/p ex lap with lysis of adhesions, polycythemia, hypothyroidism, schizoaffective disorder, hypertension, hyperlipidemia presented for abdominal distention and emesis found to have SBO.    #SBO s/p ex lap with adhesion lysis   -Imaging and labs reviewed.  -Surgery following.  NGT removed, advance diet as tolerated.  -Empiric course of Zosyn for initial leukocytosis. Remains afebrile, no acute signs of worsening infection. Likely DC tomorrow.   -Encourage early mobilization and multimodal pain control.    #WAYNE, resolved  -DC fluids as diet is advanced.      #Hypothyroidism  #Hyperlipidemia  #MDD  -Cont home Synthroid and sertraline    DVT ppx: heparin subcu   Diet: CLD  Disposition: TBD [facility]  Code status: Full    MaxDO Man MukherjeeSelect Specialty Hospitalist

## 2024-08-04 NOTE — PLAN OF CARE
Pt a&o x 3.  Forgetful @ times  Bed & chair alarm in use again today, as precautions  Maintaining sats on ra.    Tele = nsr w/ bbb & elevated t waves  Lytes wnl  Ppn infusing thru left midline  Incont of bowel x 2 this morning.  Wearing adult pull up  Tylenol & toradol iv given for c/o pain.  Pt reports good relief  Midline staples cdi, well approximated & shiv  Mepilex to sacrum for small areas of skin breakdown

## 2024-08-04 NOTE — PROGRESS NOTES
[facility]  Progress Note    Catherine Bustamante Patient Status:  Inpatient    1935 MRN [id number]   Location [facility] 3NW-A Attending Raffi Brown,    Hosp Day # 14 PCP Flynn Polk MD     Subjective:  Tolerating clear liquids.  Up in the chair.  Seems comfortable.  Had bowel movement    Objective/Physical Exam:  General: Alert, orientated x3.  Cooperative.  No apparent distress.  Vital Signs:  Blood pressure 137/73, pulse 87, temperature 97.9 °F (36.6 °C), temperature source Oral, resp. rate 16, height 5' 3\" (1.6 m), weight 103 lb 12.8 oz (47.1 kg), SpO2 95%, not currently breastfeeding.  HEENT: Exam is unremarkable.  Without scleral icterus.  Mucous membranes are moist. Oropharynx is clear.  Lungs: Clear to auscultation bilaterally.  Cardiac: Regular rate and rhythm. No murmur.  Abdomen: Soft, much less distended, wound okay  Extremities:  No lower extremity edema noted.  Without clubbing or cyanosis.    Skin: Normal texture and turgor.  Neurologic: Cranial nerves are grossly intact.  Motor strength and sensory examination is grossly normal.  No focal neurologic deficit.    Labs:  Lab Results   Component Value Date    PGLU 108 2024       Assessment/Plan:  Postop day #2 following laparotomy and lysis of adhesions.  Progressing well.  Continue to advance diet.  Hold on hyperalimentation.  Continue PPN for today    Serge Chi MD  2024  8:28 AM

## 2024-08-04 NOTE — DIETARY NOTE
[facility]   part of [facility]   CLINICAL NUTRITION - TPN FOLLOW UP    Catherine WM Bustamante     Parenteral Nutrition- via PIV    PPN to discontinue after current bag finishes infusing today.    *Current PPN providin dextrose calories, 400 SMOF lipid calories (33% lipids), 65 gm protein, and Total Calories: 1230 kcal (~77% of goal) in 2000 ml.    Osmolarity: 897.9 mOsm/L (suitable for peripheral infusion).  Unable to meet goal nutrition d/t osmolarity restriction w/ PPN.   Consider PICC line placement if diet is unable to advance.    *Goal PN to provide: 800 dextrose calories, 480 SMOF lipid calories (30% lipids), 80 gm protein, and Total Calories:1600 kcal.       Labs:   Recent Labs   Lab 24  0527 24  0559 24  0836   * 126* 132*    142 138   K 4.3  4.3 4.7 4.2    111 109   CO2 25.0 27.0 25.0   BUN 8* 10 10   CREATSERUM 0.52* 0.53* 0.47*   CA 7.7* 7.7* 8.0*   PHOS 2.8 2.1* 2.9   MG 1.7 2.1 2.3   ALKPHO 52*  --   --    AST 10  --   --    ALT 12  --   --    BILT 0.3  --   --    TRIG 113  --   --      Glucose POC last 24hr ().    NG removed.   Diet advanced to Clear Liquid on 8/3 and Full Liquid today.  Pt does not have the greatest appetite per RN report. Will add ONS to help  maximize PO intakes: Ensure Plus High Protein at breakfast and Ensure Clear BID; at lunch and dinner (provides: 830 kcal, 36 gm protein).   Per Surgery note (): Continue to advance diet. Hold on hyperalimentation. Continue PPN for today.     See full assessment .    Pt is at high nutrition risk.    Barbie Templeton MS, RD, LDN  Clinical Dietitian  Ext:[phone]

## 2024-08-04 NOTE — PLAN OF CARE
Alert, orient, forgetful. Denies nausea. Tolerated clears. NG clamped throughout night- residual checked- 25, NG removed. Aquacel dressing removed to midline, staples present, incision approximated. Assisted to bathroom, up with walker, incont bm, voiding freely. Reviewed plan of care with patient.

## 2024-08-05 LAB
ANION GAP SERPL CALC-SCNC: 4 MMOL/L (ref 0–18)
BUN BLD-MCNC: 7 MG/DL (ref 9–23)
CALCIUM BLD-MCNC: 8.3 MG/DL (ref 8.7–10.4)
CHLORIDE SERPL-SCNC: 111 MMOL/L (ref 98–112)
CO2 SERPL-SCNC: 25 MMOL/L (ref 21–32)
CREAT BLD-MCNC: 0.5 MG/DL
EGFRCR SERPLBLD CKD-EPI 2021: 90 ML/MIN/1.73M2 (ref 60–?)
GLUCOSE BLD-MCNC: 84 MG/DL (ref 70–99)
GLUCOSE BLD-MCNC: 90 MG/DL (ref 70–99)
GLUCOSE BLD-MCNC: 99 MG/DL (ref 70–99)
MAGNESIUM SERPL-MCNC: 2.3 MG/DL (ref 1.6–2.6)
OSMOLALITY SERPL CALC.SUM OF ELEC: 288 MOSM/KG (ref 275–295)
PHOSPHATE SERPL-MCNC: 2.6 MG/DL (ref 2.4–5.1)
POTASSIUM SERPL-SCNC: 4.5 MMOL/L (ref 3.5–5.1)
SODIUM SERPL-SCNC: 140 MMOL/L (ref 136–145)

## 2024-08-05 PROCEDURE — 97116 GAIT TRAINING THERAPY: CPT

## 2024-08-05 PROCEDURE — 82962 GLUCOSE BLOOD TEST: CPT

## 2024-08-05 PROCEDURE — 83735 ASSAY OF MAGNESIUM: CPT | Performed by: SURGERY

## 2024-08-05 PROCEDURE — 84100 ASSAY OF PHOSPHORUS: CPT | Performed by: SURGERY

## 2024-08-05 PROCEDURE — 80048 BASIC METABOLIC PNL TOTAL CA: CPT | Performed by: SURGERY

## 2024-08-05 PROCEDURE — 97110 THERAPEUTIC EXERCISES: CPT

## 2024-08-05 RX ORDER — PANTOPRAZOLE SODIUM 40 MG/1
40 TABLET, DELAYED RELEASE ORAL
Status: DISCONTINUED | OUTPATIENT
Start: 2024-08-06 | End: 2024-08-06

## 2024-08-05 RX ORDER — SUCRALFATE ORAL 1 G/10ML
1 SUSPENSION ORAL 3 TIMES DAILY PRN
Status: DISCONTINUED | OUTPATIENT
Start: 2024-08-05 | End: 2024-08-06

## 2024-08-05 NOTE — PROGRESS NOTES
[facility] Hospitalist Progress Note    Subjective:  Patient seen at bedside.    No overnight events.   Feeling better after NGT removal.  Tolerating diet.  Passing BM/flatus.  NAD.     Review of Systems  Comprehensive ROS reviewed and negative except for what is stated in HPI.      OBJECTIVE:  /59 (BP Location: Right arm)   Pulse 89   Temp 97.6 °F (36.4 °C) (Oral)   Resp 18   Ht 5' 3\" (1.6 m)   Wt 102 lb 14.4 oz (46.7 kg)   SpO2 98%   BMI 18.23 kg/m²   General: No apparent distress.  Alert and oriented.  HEENT: Mucous membranes moist.   Pulm: Clear breath sounds bilaterally.  Normal respiratory effort.  CV: Regular rate and rhythm, no murmur.   Abd: Soft, mild tenderness upon palpation.  No rebound or guarding.  MSK: Full range of motion in extremities, no obvious deformities.    Skin: No lesions or rashes.  Neuro: No obvious focal deficits.    LABS:   Lab Results   Component Value Date    CREATSERUM 0.50 08/05/2024    BUN 7 08/05/2024     08/05/2024    K 4.5 08/05/2024     08/05/2024    CO2 25.0 08/05/2024    GLU 90 08/05/2024    CA 8.3 08/05/2024    MG 2.3 08/05/2024    PHOS 2.6 08/05/2024    PGLU 84 08/05/2024       All lab work and imaging personally reviewed.    Assessment/ Plan:   Patient is a 89 year old female with history of multiple SBO s/p ex lap with lysis of adhesions, polycythemia, hypothyroidism, schizoaffective disorder, hypertension, hyperlipidemia presented for abdominal distention and emesis found to have SBO.    #SBO s/p ex lap with adhesion lysis   -Imaging and labs reviewed.  -Surgery following.  NGT removed, advance diet as tolerated per surgery.  -Empiric course of Zosyn for initial leukocytosis. Remains afebrile, no acute signs of worsening infection. Likely DC today per surgery.   -Encourage early mobilization and multimodal pain control.    #WAYNE, resolved  -DC fluids as diet is advanced.     #Hypothyroidism  #Hyperlipidemia  #MDD  -Cont home Synthroid and  sertraline    DVT ppx: heparin subcu   Diet: CLD  Disposition: TBD [facility]  Code status: Full    Isabelle Sharma MD   [facility]

## 2024-08-05 NOTE — PAYOR COMM NOTE
--------------  7/25 7/26 7/27 8/1 8/4:  CONTINUED STAY REVIEW    Payor: [employer]  Subscriber #:  [id number]  Authorization Number: [id number]    Admit date: 7/21/24  Admit time:  9:50 PM    7/25 SURGERY:       Subjective:     Patient tolerated ng removal and started on clears  She denies any nausea, she is tolerating clear liquids         Impression:      88 y/o with SBO  -resolving, ng out, tolerating clears     Plan:     Reviewed and encourage up in chair/ambulate  Clear liquids this morning, can advance to full liquids this afternoon  Would recommend low fiber tomorrow and possible dc tomorrow  All questions answered  Dc planning      LISA Cardenas  [facility]  7/25/2024 7/26 SURGERY:    Subjective:     Overnight notes reviewed, emesis, NG tube inserted charted 2700cc output   Patient confused this morning  Obs series today pending                Impression   CONCLUSION:    1. Imaging to 200 minutes after the administration of contrast through patient's NG tube demonstrates persistent dilated small bowel loops within the lower abdomen and pelvis.  Findings concerning for a small bowel obstruction.       Impression:      88 y/o with SBO  -NG had been out and tolerating liquids for 24 hrs, overnight emesis, NG inserted with large amount of output     Plan:     Will obtain SBFT today  IV fluids  NG to LIS  NPO ice chips ok  Further orders pending imaging  All questions answered     LISA Cardenas    7/27 SURGERY:    Subjective:  Patient is awake and more oriented and answering appropriately.  Passing flatus and less NGT out.  Awaiting KUB today      Assessment/Plan: SBO     -awaiting for KUB today  -if the contrast is not seen in the colon then will discuss about surgery  -if the contrast is seen in the colon then will start clear liquid  -voiced understanding     Иирна Rodrigues MD  7/27/2024  9:35 AM    8/1 SURGERY:    OPERATIVE REPORT     PREOPERATIVE  DIAGNOSIS:  Partial small-bowel obstruction.  POSTOPERATIVE DIAGNOSIS:  Partial small-bowel obstruction.  PROCEDURE:  Exploratory laparotomy and lysis of adhesion.     ASSISTANT:  Jocelyne Varner PA-C     ANESTHESIA:  General.     ESTIMATED BLOOD LOSS:  2 mL.     SPECIMEN:  None.     DISPOSITION:  To PACU.     COMPLICATIONS:  None.     INDICATIONS:  Patient is an 89-year-old female who presented to the emergency room with an abdominal pain and bloating about 1 week ago.  She was admitted with the NG tube, and conservative measure was taken including performing a small bowel follow-through which showed the contrast moving into the colon.  However, the patient's NG output has been intermittent from low to high and at this point, the decision was made to explore since the output has not been consistently low.  The patient had several years ago exploratory laparotomy and lysis of adhesion.  So I discussed the operation with the patient and the patient's daughter and both agreed and signed the informed consent.     FINDINGS:  Internal hernia from small bowel to small bowel and 2 mesentery dense adhesions.     OPERATIVE TECHNIQUE:  Patient was brought to the operating room, was placed in supine position on the operating table.  Lower extremity SCD boots were placed.  After IV sedation and endotracheal intubation, a Mi catheter was inserted then abdomen was prepped into a sterile field.  Then previous midline incision was reopened using a 10 blade.  Electric Bovie cautery was used to separate the subcutaneous tissue and entered through the linea alba into the abdominal cavity.  The proximal bowel was dilated, the distal bowel was decompressed and as the small bowel was inspected from the ligament of Treitz in the mid jejunum, there was a twisting of the bowel and there was bowel-to-bowel adhesion and also bowel-to-mesentery adhesion where there was an internal hernia.  There was no ischemia of the bowel and appeared to  be intermittent in nature.  So the adhesions were released using the Metzenbaum and then the defect was reapproximated using 3-0 Vicryl placing seromuscular stitches on the mesentery as well as the bowel and then after inspecting the entire bowel, there were no additional issues.  At this time, the bowel was placed back into the abdomen.  Saline solution was used.  The area was copiously irrigated.  Good hemostasis noted, and then the omentum was placed on top and then using a looped PDS, the fascia was reapproximated and then staples for the skin, then placing the dressing.  The patient tolerated the procedure well, was extubated in the operating room, and transferred to PACU in stable condition.  At the end of the case, the needle, instrument, and sponge counts were all correct.  At the end of the procedure, the anesthesiologist performed a TAP block for local pain control.  The surgical assistant was medically necessary for the entire procedure to position patient, prep the area, drape, retract the wound, and to inspect the small bowel and assist in closing and transferring patient out of the operating room.     Dictated By Ирина Rodrigues M.D.  d:08/01/2024 16:04:12    8/4 SURGERY:    Subjective:  Tolerating clear liquids.  Up in the chair.  Seems comfortable.  Had bowel movement     Objective/Physical Exam:  General: Alert, orientated x3.  Cooperative.  No apparent distress.  Vital Signs:  Blood pressure 137/73, pulse 87, temperature 97.9 °F (36.6 °C), temperature source Oral, resp. rate 16, height 5' 3\" (1.6 m), weight 103 lb 12.8 oz (47.1 kg), SpO2 95%, not currently breastfeeding.  HEENT: Exam is unremarkable.  Without scleral icterus.  Mucous membranes are moist. Oropharynx is clear.  Lungs: Clear to auscultation bilaterally.  Cardiac: Regular rate and rhythm. No murmur.  Abdomen: Soft, much less distended, wound okay  Extremities:  No lower extremity edema noted.  Without clubbing or cyanosis.    Skin: Normal  texture and turgor.  Neurologic: Cranial nerves are grossly intact.  Motor strength and sensory examination is grossly normal.  No focal neurologic deficit.     Labs:        Lab Results   Component Value Date     PGLU 108 08/04/2024         Assessment/Plan:  Postop day #2 following laparotomy and lysis of adhesions.  Progressing well.  Continue to advance diet.  Hold on hyperalimentation.  Continue PPN for today     Serge Chi MD  8/4/2024  8:28 AM                        MEDICATIONS ADMINISTERED IN LAST 1 DAY:  acetaminophen (Ofirmev) 10 mg/mL infusion premix 700 mg       Date Action Dose Route User    8/4/2024 0954 New Bag 700 mg Intravenous Savannah Ghosh RN          famotidine (Pepcid) 20 mg/2mL injection 20 mg       Date Action Dose Route User    8/4/2024 0822 Given 20 mg Intravenous Savannah Ghosh RN          heparin (Porcine) 5000 UNIT/ML injection 5,000 Units       Date Action Dose Route User    8/5/2024 0457 Given 5,000 Units Subcutaneous (Left Lower Abdomen) Raysa Molina RN    8/4/2024 1830 Given 5,000 Units Subcutaneous (Left Lower Abdomen) Savannah Ghosh RN          ketorolac (Toradol) 15 MG/ML injection 15 mg       Date Action Dose Route User    8/4/2024 2028 Given 15 mg Intravenous Raysa Molina RN    8/4/2024 0822 Given 15 mg Intravenous Savannah Ghosh RN          levothyroxine (Synthroid) tab 50 mcg       Date Action Dose Route User    8/5/2024 0458 Given 50 mcg Oral Raysa Molina RN          pantoprazole (Protonix) 40 mg in sodium chloride 0.9% PF 10 mL IV push       Date Action Dose Route User    8/4/2024 0822 Given 40 mg Intravenous Savannah Ghosh RN          piperacillin-tazobactam (Zosyn) 3.375 g in dextrose 5% 100 mL IVPB-ADDV       Date Action Dose Route User    8/5/2024 0118 New Bag 3.375 g Intravenous Raysa Molina RN    8/4/2024 1828 New Bag 3.375 g Intravenous Savannah Ghosh RN    8/4/2024 1042 New Bag 3.375 g Intravenous Savannah Ghosh RN           sertraline (Zoloft) tab 100 mg       Date Action Dose Route User    8/4/2024 2028 Given 100 mg Oral Raysa Molina, BETTYE          sucralfate (Carafate) 1 GM/10ML oral suspension 1 g       Date Action Dose Route User    8/4/2024 1826 Given 1 g Per NG Tube Savannah Ghosh RN    8/4/2024 1251 Given 1 g Per NG Tube Savannah Ghosh RN          sucralfate (Carafate) 1 GM/10ML oral suspension 1 g       Date Action Dose Route User    8/5/2024 0609 Given 1 g Oral Raysa Molina RN            Vitals (last day)       Date/Time Temp Pulse Resp BP SpO2 Weight O2 Device O2 Flow Rate (L/min) [facility]    08/05/24 0414 98.3 °F (36.8 °C) -- 16 -- -- -- None (Room air) --     08/05/24 0414 -- 76 -- 114/60 97 % -- -- --     08/04/24 2008 98.6 °F (37 °C) 81 14 131/73 96 % -- None (Room air) --     08/04/24 1611 98.4 °F (36.9 °C) 93 18 130/71 97 % -- None (Room air) --     08/04/24 1345 -- 88 -- -- -- -- -- --     08/04/24 1228 98.2 °F (36.8 °C) 74 16 126/60 96 % -- None (Room air) --     08/04/24 0747 97.9 °F (36.6 °C) 87 16 137/73 95 % -- -- --     08/04/24 0600 97.7 °F (36.5 °C) 71 16 124/62 94 % -- None (Room air) -- [place]    08/04/24 0130 98 °F (36.7 °C) 67 18 110/50 94 % -- None (Room air) -- [place]

## 2024-08-05 NOTE — CM/SW NOTE
SW received order stating 'finalize discharge plan. Potentially ready for DC on 8/5/24.' Chart reviewed and noted PT is still anticipating pt will return home with home healthcare. Per previous notes, pt's dtr has selected [facility].     Plan is for pt to return home with [facility] at discharge. SW will continue to follow.     MARQUEZ Cowan  Discharge Planner

## 2024-08-05 NOTE — CDS QUERY
Dear Dr Sharma:  Please provide a nutritional diagnosis, related to the clinical information below:   [  x ] Severe Malnutrition  [   ]  Other (please specify) :_____________________________      Documentation from the Medical Record:     Clinical Indicators:  ___ Ht: 160 cm (5' 3\") Wt: 45.8 kg (100 lb 14.4 oz). BMI: Body mass index is 17.87 kg/m².  ___H&P:  89-year-old female significant past medical history of moderate protein calorie malnutrition    ___ Dietician Consult Note: Pt meets severe malnutrition criteria at this time. CRITERIA FOR MALNUTRITION DIAGNOSIS: Criteria for severe malnutrition diagnosis: acute illness/injury related to energy intake less than 50% for greater than 5 days, body fat moderate depletion, and muscle mass moderate depletion. NUTRITION RELATED PHYSICAL FINDINGS: 1.Body Fat/Muscle Mass: moderate depletion body fat Triceps and Midaxillary line and mild muscle depletion Temple region, Clavicle region, Dorsal hand region, and Calf region    Potential Risk Factors: SBO    Treatment: Dietician Consult. PPN, : Ensure Plus High Protein at breakfast and Ensure Clear BID           Use of terms such as suspected, possible, or probable (associated with a specific diagnosis that is being evaluated, monitored, or treated as if it exists) are acceptable and can be coded in the inpatient setting, when documented at the time of discharge.     Please add any additional documentation to your progress note and continue to document this through discharge.    Thank you. For questions regarding this query, please contact Clinical : Meggan Garcia [phone]  This form is a permanent part of the medical record.

## 2024-08-05 NOTE — PLAN OF CARE
Pt a&o x 4.  Pleasant & cooperative w/ care  Up w/ sba x 1 to bathroom  Bowel movements remain soft / liquid & green.   Wearing adult briefs d/t Pt having urgency when needing to have BM.    Voiding w/out difficulty  Midline staples cdi, well approximated & shiv  Pt c/o pain '3/10'  to mid upper abdomen w/ palpation only.  Tylenol given w/ relief  Pt eating chocolate ice cream & ensure clear for breakfast.  Denies n&v  Jose Rafael wnl  Working w/ physical therapy today  Discharge planning ongoing

## 2024-08-05 NOTE — PHYSICAL THERAPY NOTE
PHYSICAL THERAPY TREATMENT NOTE - INPATIENT    Room Number: 304/304-A     Session: 5    Number of Visits to Meet Established Goals: 4    Presenting Problem: SBO  Co-Morbidities : vertigo, HLD, SBO    ASSESSMENT   Patient demonstrates good  progress this session, goals progressing with 2/3 met this session.    Patient continues to function near baseline with bed mobility, transfers, gait, and stair negotiation. Contributing factors to remaining limitations include decreased functional strength, decreased endurance/aerobic capacity, pain, impaired seated/standing balance, and decreased muscular endurance.  Next session anticipate patient to progress bed mobility, transfers, gait, and stair negotiation.  Physical Therapy will continue to follow patient for duration of hospitalization.    Patient continues to benefit from continued skilled PT services: at discharge to promote prior level of function and safety with additional support and return home with home health PT.    PLAN  PT Treatment Plan: Bed mobility;Body mechanics;Endurance;Energy conservation;Gait training;Stair training;Transfer training;Balance training;Neuromuscular re-educate;Strengthening;Family education;Patient education  Rehab Potential : Good  Frequency (Obs): 3x/week    CURRENT GOALS       Goal #1 Patient is able to demonstrate supine - sit EOB @ level: supervision   MET   Goal #2 Patient is able to demonstrate transfers Sit to/from Stand at assistance level: supervision- MET      Goal #3 Patient is able to ambulate 250 feet with assist device:  LRAD  at assistance level: supervision      Goal #4     Goal #5     Goal #6       2024 all goals ongoing    SUBJECTIVE  \"I'm bored of sitting\"    OBJECTIVE  Precautions: Bed/chair alarm;NG suction  NG dc.    WEIGHT BEARING RESTRICTION  Weight Bearing Restriction: None                PAIN ASSESSMENT   Ratin  Location: pt denies pain  Management Techniques: Activity  promotion;Repositioning;Relaxation    BALANCE                                                                                                                       Static Sitting: Fair +  Dynamic Sitting: Fair +           Static Standing: Fair -  Dynamic Standing: Fair -    ACTIVITY TOLERANCE                         O2 WALK         AM-PAC '6-Clicks' INPATIENT SHORT FORM - BASIC MOBILITY  How much difficulty does the patient currently have...  Patient Difficulty: Turning over in bed (including adjusting bedclothes, sheets and blankets)?: None   Patient Difficulty: Sitting down on and standing up from a chair with arms (e.g., wheelchair, bedside commode, etc.): None   Patient Difficulty: Moving from lying on back to sitting on the side of the bed?: None   How much help from another person does the patient currently need...   Help from Another: Moving to and from a bed to a chair (including a wheelchair)?: A Little   Help from Another: Need to walk in hospital room?: A Little   Help from Another: Climbing 3-5 steps with a railing?: A Little       AM-PAC Score:  Raw Score: 21   Approx Degree of Impairment: 28.97%   Standardized Score (AM-PAC Scale): 50.25   CMS Modifier (G-Code): CJ    FUNCTIONAL ABILITY STATUS  Gait Assessment   Functional Mobility/Gait Assessment  Gait Assistance: Contact guard assist  Distance (ft): 200  Assistive Device: Rolling walker  Pattern: Within Functional Limits    Skilled Therapy Provided      Transfer Mobility:  Partial Sit>Stand: SBA   Stand>Sit: SBA   Gait: RW and CGA.     Therapist's Comments:   Education provided on  Benefits of upright position  Promotion of walking with nursing staff  Exercises   Body mechanics       THERAPEUTIC EXERCISES  Lower Extremity Alternating marching  Ankle pumps  LAQ  Standing marching  Heel raises in standing  Hip abd in standing  Knee flexion in standing     Upper Extremity Elbow flex/ext and Wrist flex/ext     Position Sitting and standing      Repetitions   10   Sets   1     Patient End of Session: Up in chair;Needs met;Call light within reach;RN aware of session/findings;All patient questions and concerns addressed;Alarm set    PT Session Time: 23 minutes  Therapeutic Exercise: 8 minutes   Gait training 15 min

## 2024-08-05 NOTE — PLAN OF CARE
A&Ox3-4, forgetful at times. VSS. RA. .  Telemetry: NSR, no calls from the tele tech.  GI: Abdomen soft, rounded. She has had one incontinent BM this shift. She does report incision pain, toradol given x1. Heat packs have provided relief as well.  Denies nausea, she is tolerating full liquids.  : She is continent of urine and calls appropriately to go to the restroom.  Up with standby assist/walker.  Diet: Full liquids and supplements.  PPN stopped as ordered.  All appropriate safety measures in place. All questions and concerns addressed.

## 2024-08-06 VITALS
WEIGHT: 102.75 LBS | SYSTOLIC BLOOD PRESSURE: 117 MMHG | DIASTOLIC BLOOD PRESSURE: 51 MMHG | HEIGHT: 63 IN | BODY MASS INDEX: 18.21 KG/M2 | RESPIRATION RATE: 17 BRPM | HEART RATE: 84 BPM | TEMPERATURE: 99 F | OXYGEN SATURATION: 97 %

## 2024-08-06 LAB
ANION GAP SERPL CALC-SCNC: 4 MMOL/L (ref 0–18)
BUN BLD-MCNC: 8 MG/DL (ref 9–23)
CALCIUM BLD-MCNC: 9.1 MG/DL (ref 8.7–10.4)
CHLORIDE SERPL-SCNC: 110 MMOL/L (ref 98–112)
CO2 SERPL-SCNC: 26 MMOL/L (ref 21–32)
CREAT BLD-MCNC: 0.68 MG/DL
EGFRCR SERPLBLD CKD-EPI 2021: 83 ML/MIN/1.73M2 (ref 60–?)
GLUCOSE BLD-MCNC: 97 MG/DL (ref 70–99)
MAGNESIUM SERPL-MCNC: 2.1 MG/DL (ref 1.6–2.6)
OSMOLALITY SERPL CALC.SUM OF ELEC: 288 MOSM/KG (ref 275–295)
PHOSPHATE SERPL-MCNC: 3.2 MG/DL (ref 2.4–5.1)
POTASSIUM SERPL-SCNC: 4.5 MMOL/L (ref 3.5–5.1)
SODIUM SERPL-SCNC: 140 MMOL/L (ref 136–145)

## 2024-08-06 PROCEDURE — 80048 BASIC METABOLIC PNL TOTAL CA: CPT | Performed by: SURGERY

## 2024-08-06 PROCEDURE — 84100 ASSAY OF PHOSPHORUS: CPT | Performed by: SURGERY

## 2024-08-06 PROCEDURE — 83735 ASSAY OF MAGNESIUM: CPT | Performed by: SURGERY

## 2024-08-06 RX ORDER — LOPERAMIDE HYDROCHLORIDE 2 MG/1
2 CAPSULE ORAL 4 TIMES DAILY PRN
Status: DISCONTINUED | OUTPATIENT
Start: 2024-08-06 | End: 2024-08-06

## 2024-08-06 RX ORDER — PANTOPRAZOLE SODIUM 40 MG/1
40 TABLET, DELAYED RELEASE ORAL
Qty: 30 TABLET | Refills: 0 | Status: SHIPPED | OUTPATIENT
Start: 2024-08-07

## 2024-08-06 RX ORDER — MEMANTINE HYDROCHLORIDE 5 MG/1
5 TABLET ORAL 2 TIMES DAILY
COMMUNITY

## 2024-08-06 NOTE — CM/SW NOTE
JAYA received message from RN stating pt does not 'feel ready' to discharge home. RN stated pt is requesting this writer reach out to her son Brandon to 'explain the situation' and is concerned with not having a backup plan. Pt can pay OOP for SNF stay if needed.    Chart reviewed and per PT note, pt ambulated 200ft CGA and pt transferred with SBA. Per previous SW notes, [facility] and caregiving information has been provided to pt's dtr and pt's family are considering increasing services and supervision. Pt resides at [facility] with her mentally disabled son. Pt has [facility] arranged.     SW called pt's son Brandon per pt's request and left VM requesting call back. Updated RN.     Addendum (1:40pm) - JAYA received message from RN inquiring if this writer has spoken with pt's family. SW left VM for pt's son Brandon and pt's son left VM for this writer. SW left another VM for pt's son Brandon. JAYA spoke with pt's dtr Maryan via phone.     JAYA informed pt's dtr pt is medically cleared for discharge today an inquired if caregiving has been arranged for pt. Pt's dtr stated she has some caregivers lined up that are covered by insurance for RN and PT. Explained difference between HH and caregiving. Pt's dtr stated she has not arranged a private duty caregiver for pt yet. Pt's dtr inquired if a private duty caregiver is required. JAYA informed pt's dtr a caregiver is not required but may be beneficial if needed. JAYA informed pt's dtr if pt is unable to manage her needs she can hire a caregiver for additional support or respite at SNF. Pt's dtr has list of caregiving agencies and [facility]. JAYA informed pt's dtr pt is inquiring on 'backup plan' and again discussed caregiving or respite. Pt's dtr stated she is uncertain if pt spoke with her brother regarding a 'backup plan' but she does not know of any additional plans at this time. Pt's dtr stated she will  pt this afternoon and transport her back to [facility].      Plan for pt to return to [facility] with [facility]. Notified [facility] of pt's discharge today.     MARQUEZ Cowan  Discharge Planner

## 2024-08-06 NOTE — PROGRESS NOTES
Patient alert and oriented x 4, forgetful at times, room air, denies chest pain and shortness of breath. Telemetry NSR with wide QRS complexes, denies dizziness, ST to 100s with ambulation. Tolerating soft diet, denies any nausea. Abdomen soft, tender in upper quadrants with firm palpation. Midline with staples and CHARLES, C/D/I. Incontinent of bowels at times, wearing adult brief. Voiding without difficulty. Up with 1x assist and walker. Plan of care discussed with patient and family, all questions addressed. All appropriate safety measures in place, call light within reach.

## 2024-08-06 NOTE — PLAN OF CARE
Assumed care of patient at 0700  Dr. Rodrigues is ok with discharge. Aware of diarrhea. Encourage pt to eat bananas and bread. Also was ok with adding imodium.   Pt concerned that she is to weak to [place] home [place] who she takes care of.  RN reached out to  to speak with family about additional options that can be done.   left message for Brandon.   Midline incision is C/D/I.             Problem: Patient/Family Goals  Goal: Patient/Family Long Term Goal  Description: Patient's Long Term Goal: To Eat     Interventions:  - Obstructive Series X-Rays  -Clamping Trial  -Removal of NG Tube  -Trial of Clears  -Advancement of Diet  - See additional Care Plan goals for specific interventions  Outcome: Progressing  Goal: Patient/Family Short Term Goal  Description: Patient's Short Term Goal: Removal of NG Tube    Interventions:   - Clamping Trial  Outcome: Progressing     Problem: PAIN - ADULT  Goal: Verbalizes/displays adequate comfort level or patient's stated pain goal  Description: INTERVENTIONS:  - Encourage pt to monitor pain and request assistance  - Assess pain using appropriate pain scale  - Administer analgesics based on type and severity of pain and evaluate response  - Implement non-pharmacological measures as appropriate and evaluate response  - Consider cultural and social influences on pain and pain management  - Manage/alleviate anxiety  - Utilize distraction and/or relaxation techniques  - Monitor for opioid side effects  - Notify MD/LIP if interventions unsuccessful or patient reports new pain  - Anticipate increased pain with activity and pre-medicate as appropriate  Outcome: Progressing     Problem: SAFETY ADULT - FALL  Goal: Free from fall injury  Description: INTERVENTIONS:  - Assess pt frequently for physical needs  - Identify cognitive and physical deficits and behaviors that affect risk of falls.  - [place] fall precautions as indicated by assessment.  - Educate pt/family on  patient safety including physical limitations  - Instruct pt to call for assistance with activity based on assessment  - Modify environment to reduce risk of injury  - Provide assistive devices as appropriate  - Consider OT/PT consult to assist with strengthening/mobility  - Encourage toileting schedule  Outcome: Progressing

## 2024-08-06 NOTE — PROGRESS NOTES
[facility]  Progress Note    Catherine Bustamante Patient Status:  Inpatient    1935 MRN [id number]   Location [facility] 3NW-A Attending Bill Shrama*   Hosp Day # 16 PCP Flynn Polk MD     Subjective:  ***      Objective/Physical Exam:  General: No apparent distress.  Vital Signs:  Blood pressure 117/58, pulse 82, temperature 98.5 °F (36.9 °C), temperature source Oral, resp. rate 18, height 5' 3\" (1.6 m), weight 102 lb 11.8 oz (46.6 kg), SpO2 96%, not currently breastfeeding.  HEENT: Exam is unremarkable.  Without scleral icterus.  Mucous membranes are moist.   Lungs: Clear to auscultation bilaterally.  Cardiac: Regular rate and rhythm. No murmur.  Abdomen:  ***  Extremities:  No calf tenderness       Intake/Output Summary (Last 24 hours) at 2024 0718  Last data filed at 2024 1619  Gross per 24 hour   Intake 820 ml   Output --   Net 820 ml       Labs:  Lab Results   Component Value Date    CREATSERUM 0.68 2024    BUN 8 2024     2024    K 4.5 2024     2024    CO2 26.0 2024    GLU 97 2024    CA 9.1 2024    MG 2.1 2024    PHOS 3.2 2024       Assessment/Plan:  ***    Ирина Rodrigues MD  2024  7:18 AM

## 2024-08-06 NOTE — DISCHARGE SUMMARY
[facility] Hospitalist Discharge Summary     Patient ID:  Catherine Bustamante  89 year old  4/23/1935    Admit date: 7/21/2024    Discharge date and time: 08/06/24     Attending Physician: Bill Sharma*     Primary Care Physician: Flynn Polk MD     Discharge Diagnoses: SBO (small bowel obstruction) ([facility]) [K56.609]  WAYNE (acute kidney injury) ([facility]) [N17.9]  Nausea and vomiting, unspecified vomiting type [R11.2]    Please note that only IHP DMG and EMG patients enrolled in the Medicare ACO, [facility] and [facility] HMOs will be handled by the [facility] Care Management team.  For all other patients, please follow usual protocol for discharge care transition.    Discharge Condition: stable    Disposition:  home    Important Follow up:  - PCP within 2 weeks              Hospital Course:        89-year-old female significant past medical history of moderate protein calorie malnutrition, history of SBO in the past requiring ex lap with ASIF, history of SBO status post ex lap with lysis of adhesions in the past in October 2022, polycythemia, hypothyroidism, schizoaffective disorder, hyperlipidemia, aortic atherosclerosis, presented with abdominal distention as well as vomiting.  Most of the history is obtained from the daughter, patient is complaint of vomiting the night before the following day when daughter visited she continued to have abdominal pain as well as vomiting which is when the daughter knew that she had this obstruction 2 years ago and brought her into the hospital.     In the emergency room vitals have been stable, BMP is unremarkable except for a creatinine of 1.7, white count of 13.1 hemoglobin is 17.9 UA showed 6-10 WBCs CT abdomen pelvis was done that showed high-grade distal small bowel obstruction with possible closed-loop small bowel obstruction, general surgery was consulted, lactic acid was within normal limits     This morning patient has an NG  tube in place to low intermittent suction, states that she is passing gas but no bowel movement minimal abdominal pain,    #SBO s/p ex lap with adhesion lysis   -Imaging and labs reviewed.  -Surgery following.  NGT removed, advance diet as tolerated per surgery.  -Empiric course of Zosyn for initial leukocytosis. Remains afebrile, no acute signs of worsening infection. Likely DC today per surgery.   -Encourage early mobilization and multimodal pain control.     #WAYNE, resolved  -DC fluids as diet is advanced.      #Hypothyroidism  #Hyperlipidemia  #MDD  -Cont home Synthroid and sertraline      Consults: IP CONSULT TO HOSPITALIST  IP CONSULT TO GENERAL SURGERY  IP CONSULT TO SOCIAL WORK  IP CONSULT TO FOOD AND NUTRITION SERVICES  IP CONSULT TO VASCULAR ACCESS TEAM  IP CONSULT TO SOCIAL WORK    Operative Procedures: Procedure(s) (LRB):  EXPLORATORY LAPAROTOMY WITH LYSIS OF ADHESIONS (N/A)       Patient instructions:      I as the attending physician reconciled the current and discharge medications on day of discharge.     Current Discharge Medication List        START taking these medications    Details   pantoprazole 40 MG Oral Tab EC Take 1 tablet (40 mg total) by mouth every morning before breakfast.           CONTINUE these medications which have NOT CHANGED    Details   atorvastatin 20 MG Oral Tab Take 1 tablet (20 mg total) by mouth nightly.      levothyroxine 50 MCG Oral Tab Take 1 tablet (50 mcg total) by mouth daily. ON AN EMPTY STOMACH      ASPIRIN 81 OR Take by mouth as needed (Taking as needed for sleep).      Calcium Carbonate 1500 (600 Ca) MG Oral Tab TAKE 1 TABLET DAILY.      Calcium-Vitamin D (CALTRATE 600 PLUS-VIT D OR) Take  by mouth.      Cholecalciferol (VITAMIN D3) 1000 UNITS Oral Cap Take  by mouth daily.      CENTRUM OR 1TABLET DAILY      memantine 5 MG Oral Tab Take 1 tablet (5 mg total) by mouth 2 (two) times daily.      Meclizine HCl 25 MG Oral Tab Take 1 tablet (25 mg total) by mouth 3  (three) times daily as needed for Dizziness.           STOP taking these medications       sertraline 50 MG Oral Tab              Activity: activity as tolerated  Diet: regular diet  Wound Care: as directed  Code Status: Full Code      Discharge Exam:     General: no acute distress, alert and oriented x 3  Heart: RRR  Lungs: clear bilaterally, no active wheezing  Abdomen: nontender, nondistended, intact BS  Extremities: no pedal edema   Neuro: CN inact, no focal deficits      Total time coordinating care for discharge: Greater than 30 minutes    Isabelle Sharma MD  [facility]

## 2024-08-06 NOTE — PROGRESS NOTES
Discharge order placed per hospitalist. IV's removed - patient tolerated well. Discharge instructions provided and discussed with patient and daughter Maryan. Follow-up information given. All personal belongings collected. Patient ambulated in room without difficulty. Patient brought to [facility] in wheelchair with daughter.

## 2024-08-07 NOTE — PAYOR COMM NOTE
--------------  DISCHARGE REVIEW    Payor: [employer]ICARE  Subscriber #:  [id number]  Authorization Number: [id number]    Admit date: 7/21/24  Admit time:   9:50 PM  Discharge Date: 8/6/2024  3:32 PM     Admitting Physician:   Attending Physician:  Laura att. providers found  Primary Care Physician: Flynn Polk MD          Discharge Summary Notes        Discharge Summary signed by Bill Sharma MD at 8/6/2024 12:38 PM       Author: Bill Sharma MD Specialty: Internal Medicine Author Type: Physician    Filed: 8/6/2024 12:38 PM Date of Service: 8/6/2024 12:37 PM Status: Signed    : Bill Sharma MD (Physician)                                                          [facility] Discharge Summary     Patient ID:  Catherine Bustamante  89 year old  4/23/1935    Admit date: 7/21/2024    Discharge date and time: 08/06/24     Attending Physician: Bill Sharma*     Primary Care Physician: Flynn Polk MD     Discharge Diagnoses: SBO (small bowel obstruction) ([facility]) [K56.609]  WAYNE (acute kidney injury) (HCC) [N17.9]  Nausea and vomiting, unspecified vomiting type [R11.2]    Please note that only IHP DMG and EMG patients enrolled in the Medicare ACO, [facility] ACO and [facility] HMOs will be handled by the [facility] Care Management team.  For all other patients, please follow usual protocol for discharge care transition.    Discharge Condition: stable    Disposition:  home    Important Follow up:  - PCP within 2 weeks              Hospital Course:        89-year-old female significant past medical history of moderate protein calorie malnutrition, history of SBO in the past requiring ex lap with ASIF, history of SBO status post ex lap with lysis of adhesions in the past in October 2022, polycythemia, hypothyroidism, schizoaffective disorder, hyperlipidemia, aortic atherosclerosis, presented with abdominal distention as well as vomiting.  Most of the history is  obtained from the daughter, patient is complaint of vomiting the night before the following day when daughter visited she continued to have abdominal pain as well as vomiting which is when the daughter knew that she had this obstruction 2 years ago and brought her into the hospital.     In the emergency room vitals have been stable, BMP is unremarkable except for a creatinine of 1.7, white count of 13.1 hemoglobin is 17.9 UA showed 6-10 WBCs CT abdomen pelvis was done that showed high-grade distal small bowel obstruction with possible closed-loop small bowel obstruction, general surgery was consulted, lactic acid was within normal limits     This morning patient has an NG tube in place to low intermittent suction, states that she is passing gas but no bowel movement minimal abdominal pain,    #SBO s/p ex lap with adhesion lysis   -Imaging and labs reviewed.  -Surgery following.  NGT removed, advance diet as tolerated per surgery.  -Empiric course of Zosyn for initial leukocytosis. Remains afebrile, no acute signs of worsening infection. Likely DC today per surgery.   -Encourage early mobilization and multimodal pain control.     #WAYNE, resolved  -DC fluids as diet is advanced.      #Hypothyroidism  #Hyperlipidemia  #MDD  -Cont home Synthroid and sertraline      Consults: IP CONSULT TO HOSPITALIST  IP CONSULT TO GENERAL SURGERY  IP CONSULT TO SOCIAL WORK  IP CONSULT TO FOOD AND NUTRITION SERVICES  IP CONSULT TO VASCULAR ACCESS TEAM  IP CONSULT TO SOCIAL WORK    Operative Procedures: Procedure(s) (LRB):  EXPLORATORY LAPAROTOMY WITH LYSIS OF ADHESIONS (N/A)       Patient instructions:      I as the attending physician reconciled the current and discharge medications on day of discharge.     Current Discharge Medication List        START taking these medications    Details   pantoprazole 40 MG Oral Tab EC Take 1 tablet (40 mg total) by mouth every morning before breakfast.           CONTINUE these medications which have  NOT CHANGED    Details   atorvastatin 20 MG Oral Tab Take 1 tablet (20 mg total) by mouth nightly.      levothyroxine 50 MCG Oral Tab Take 1 tablet (50 mcg total) by mouth daily. ON AN EMPTY STOMACH      ASPIRIN 81 OR Take by mouth as needed (Taking as needed for sleep).      Calcium Carbonate 1500 (600 Ca) MG Oral Tab TAKE 1 TABLET DAILY.      Calcium-Vitamin D (CALTRATE 600 PLUS-VIT D OR) Take  by mouth.      Cholecalciferol (VITAMIN D3) 1000 UNITS Oral Cap Take  by mouth daily.      CENTRUM OR 1TABLET DAILY      memantine 5 MG Oral Tab Take 1 tablet (5 mg total) by mouth 2 (two) times daily.      Meclizine HCl 25 MG Oral Tab Take 1 tablet (25 mg total) by mouth 3 (three) times daily as needed for Dizziness.           STOP taking these medications       sertraline 50 MG Oral Tab              Activity: activity as tolerated  Diet: regular diet  Wound Care: as directed  Code Status: Full Code      Discharge Exam:     General: no acute distress, alert and oriented x 3  Heart: RRR  Lungs: clear bilaterally, no active wheezing  Abdomen: nontender, nondistended, intact BS  Extremities: no pedal edema   Neuro: CN inact, no focal deficits      Total time coordinating care for discharge: Greater than 30 minutes    Isabelle Sharma MD  [facility] Hospitalist        Electronically signed by Bill Sharma MD on 8/6/2024 12:38 PM     8/5    Subjective:  Patient seen at bedside.    No overnight events.   Feeling better after NGT removal.  Tolerating diet.  Passing BM/flatus.  NAD.      Review of Systems  Comprehensive ROS reviewed and negative except for what is stated in HPI.       OBJECTIVE:  /59 (BP Location: Right arm)   Pulse 89   Temp 97.6 °F (36.4 °C) (Oral)   Resp 18   Ht 5' 3\" (1.6 m)   Wt 102 lb 14.4 oz (46.7 kg)   SpO2 98%   BMI 18.23 kg/m²   General: No apparent distress.  Alert and oriented.  HEENT: Mucous membranes moist.   Pulm: Clear breath sounds bilaterally.  Normal  respiratory effort.  CV: Regular rate and rhythm, no murmur.   Abd: Soft, mild tenderness upon palpation.  No rebound or guarding.  MSK: Full range of motion in extremities, no obvious deformities.    Skin: No lesions or rashes.  Neuro: No obvious focal deficits.     LABS:         Lab Results   Component Value Date     CREATSERUM 0.50 08/05/2024     BUN 7 08/05/2024      08/05/2024     K 4.5 08/05/2024      08/05/2024     CO2 25.0 08/05/2024     GLU 90 08/05/2024     CA 8.3 08/05/2024     MG 2.3 08/05/2024     PHOS 2.6 08/05/2024     PGLU 84 08/05/2024         All lab work and imaging personally reviewed.     Assessment/ Plan:   Patient is a 89 year old female with history of multiple SBO s/p ex lap with lysis of adhesions, polycythemia, hypothyroidism, schizoaffective disorder, hypertension, hyperlipidemia presented for abdominal distention and emesis found to have SBO.     #SBO s/p ex lap with adhesion lysis   -Imaging and labs reviewed.  -Surgery following.  NGT removed, advance diet as tolerated per surgery.  -Empiric course of Zosyn for initial leukocytosis. Remains afebrile, no acute signs of worsening infection. Likely DC today per surgery.   -Encourage early mobilization and multimodal pain control.     #WAYNE, resolved  -DC fluids as diet is advanced.      #Hypothyroidism  #Hyperlipidemia  #MDD  -Cont home Synthroid and sertraline     DVT ppx: heparin subcu   Diet: CLD  Disposition: D [facility]  Code status: Full     Isabelle Sharma MD   [facility]            Electronically signed by Bill Sharma MD at 8/5/2024 11:27 AM

## 2025-04-22 ENCOUNTER — LAB REQUISITION (OUTPATIENT)
Dept: LAB | Facility: HOSPITAL | Age: OVER 89
End: 2025-04-22
Payer: MEDICARE

## 2025-04-22 DIAGNOSIS — G31.84 MILD COGNITIVE IMPAIRMENT OF UNCERTAIN OR UNKNOWN ETIOLOGY: ICD-10-CM

## 2025-04-22 DIAGNOSIS — R41.3 OTHER AMNESIA: ICD-10-CM

## 2025-04-22 DIAGNOSIS — E78.5 HYPERLIPIDEMIA, UNSPECIFIED: ICD-10-CM

## 2025-04-22 DIAGNOSIS — E43 UNSPECIFIED SEVERE PROTEIN-CALORIE MALNUTRITION (HCC): ICD-10-CM

## 2025-04-22 LAB
ALBUMIN SERPL-MCNC: 4.9 G/DL (ref 3.2–4.8)
ALBUMIN/GLOB SERPL: 2 {RATIO} (ref 1–2)
ALP LIVER SERPL-CCNC: 63 U/L (ref 55–142)
ALT SERPL-CCNC: 14 U/L (ref 10–49)
ANION GAP SERPL CALC-SCNC: 9 MMOL/L (ref 0–18)
AST SERPL-CCNC: 15 U/L (ref ?–34)
BASOPHILS # BLD AUTO: 0.06 X10(3) UL (ref 0–0.2)
BASOPHILS NFR BLD AUTO: 1.1 %
BILIRUB SERPL-MCNC: 0.7 MG/DL (ref 0.2–1.1)
BUN BLD-MCNC: 14 MG/DL (ref 9–23)
CALCIUM BLD-MCNC: 9.9 MG/DL (ref 8.7–10.6)
CHLORIDE SERPL-SCNC: 104 MMOL/L (ref 98–112)
CHOLEST SERPL-MCNC: 194 MG/DL (ref ?–200)
CO2 SERPL-SCNC: 28 MMOL/L (ref 21–32)
CREAT BLD-MCNC: 0.86 MG/DL (ref 0.55–1.02)
EGFRCR SERPLBLD CKD-EPI 2021: 65 ML/MIN/1.73M2 (ref 60–?)
EOSINOPHIL # BLD AUTO: 0.08 X10(3) UL (ref 0–0.7)
EOSINOPHIL NFR BLD AUTO: 1.5 %
ERYTHROCYTE [DISTWIDTH] IN BLOOD BY AUTOMATED COUNT: 14.2 %
FASTING PATIENT LIPID ANSWER: YES
FASTING STATUS PATIENT QL REPORTED: YES
GLOBULIN PLAS-MCNC: 2.4 G/DL (ref 2–3.5)
GLUCOSE BLD-MCNC: 105 MG/DL (ref 70–99)
HCT VFR BLD AUTO: 47.2 % (ref 35–48)
HDLC SERPL-MCNC: 75 MG/DL (ref 40–59)
HGB BLD-MCNC: 15.6 G/DL (ref 12–16)
IMM GRANULOCYTES # BLD AUTO: 0.02 X10(3) UL (ref 0–1)
IMM GRANULOCYTES NFR BLD: 0.4 %
LDLC SERPL CALC-MCNC: 101 MG/DL (ref ?–100)
LYMPHOCYTES # BLD AUTO: 0.84 X10(3) UL (ref 1–4)
LYMPHOCYTES NFR BLD AUTO: 15.9 %
MCH RBC QN AUTO: 28.7 PG (ref 26–34)
MCHC RBC AUTO-ENTMCNC: 33.1 G/DL (ref 31–37)
MCV RBC AUTO: 86.8 FL (ref 80–100)
MONOCYTES # BLD AUTO: 0.52 X10(3) UL (ref 0.1–1)
MONOCYTES NFR BLD AUTO: 9.8 %
NEUTROPHILS # BLD AUTO: 3.76 X10 (3) UL (ref 1.5–7.7)
NEUTROPHILS # BLD AUTO: 3.76 X10(3) UL (ref 1.5–7.7)
NEUTROPHILS NFR BLD AUTO: 71.3 %
NONHDLC SERPL-MCNC: 119 MG/DL (ref ?–130)
OSMOLALITY SERPL CALC.SUM OF ELEC: 293 MOSM/KG (ref 275–295)
PLATELET # BLD AUTO: 258 10(3)UL (ref 150–450)
POTASSIUM SERPL-SCNC: 4.4 MMOL/L (ref 3.5–5.1)
PROT SERPL-MCNC: 7.3 G/DL (ref 5.7–8.2)
RBC # BLD AUTO: 5.44 X10(6)UL (ref 3.8–5.3)
SODIUM SERPL-SCNC: 141 MMOL/L (ref 136–145)
T PALLIDUM AB SER QL IA: NONREACTIVE
TRIGL SERPL-MCNC: 101 MG/DL (ref 30–149)
TSI SER-ACNC: 2.11 UIU/ML (ref 0.55–4.78)
VIT B12 SERPL-MCNC: 529 PG/ML (ref 211–911)
VLDLC SERPL CALC-MCNC: 17 MG/DL (ref 0–30)
WBC # BLD AUTO: 5.3 X10(3) UL (ref 4–11)

## 2025-04-22 PROCEDURE — 86780 TREPONEMA PALLIDUM: CPT | Performed by: FAMILY MEDICINE

## 2025-04-22 PROCEDURE — 85025 COMPLETE CBC W/AUTO DIFF WBC: CPT | Performed by: FAMILY MEDICINE

## 2025-04-22 PROCEDURE — 82607 VITAMIN B-12: CPT | Performed by: FAMILY MEDICINE

## 2025-04-22 PROCEDURE — 84443 ASSAY THYROID STIM HORMONE: CPT | Performed by: FAMILY MEDICINE

## 2025-04-22 PROCEDURE — 80053 COMPREHEN METABOLIC PANEL: CPT | Performed by: FAMILY MEDICINE

## 2025-04-22 PROCEDURE — 80061 LIPID PANEL: CPT | Performed by: FAMILY MEDICINE

## (undated) DEVICE — MEGADYNE ELECTRODE ADULT PT RT

## (undated) DEVICE — VIOLET BRAIDED (POLYGLACTIN 910), SYNTHETIC ABSORBABLE SUTURE: Brand: COATED VICRYL

## (undated) DEVICE — TOWEL SURG OR 17X30IN BLUE

## (undated) DEVICE — LAPAROTOMY SPONGE - RF AND X-RAY DETECTABLE PRE-WASHED: Brand: SITUATE

## (undated) DEVICE — COVER LT HNDL RIG FOR SUR CAM DISP

## (undated) DEVICE — SUT SILK 3-0 SH C013D

## (undated) DEVICE — SYRINGE 20CC LL TIP

## (undated) DEVICE — STERILE SYNTHETIC POLYISOPRENE POWDER-FREE SURGICAL GLOVES WITH HYDROGEL COATING, SMOOTH FINISH, STRAIGHT FINGER: Brand: PROTEXIS

## (undated) DEVICE — GLOVE,SURG,SENSICARE SLT,LF,PF,6.5: Brand: MEDLINE

## (undated) DEVICE — CLOSURE EXOFIN 1.0ML

## (undated) DEVICE — POOLE SUCTION HANDLE: Brand: CARDINAL HEALTH

## (undated) DEVICE — GOWN,SIRUS,FABRIC-REINFORCED,X-LARGE: Brand: MEDLINE

## (undated) DEVICE — LAPAROTOMY CDS: Brand: MEDLINE INDUSTRIES, INC.

## (undated) DEVICE — SPONGE: SPECIALTY PEANUT XR 100/CS: Brand: MEDICAL ACTION INDUSTRIES

## (undated) DEVICE — PROXIMATE SKIN STAPLERS (35 WIDE) CONTAINS 35 STAINLESS STEEL STAPLES (FIXED HEAD): Brand: PROXIMATE

## (undated) DEVICE — APPLICATOR PREP 26ML CHG 2% ISO ALC 70%

## (undated) DEVICE — SUT PERMA- 2-0 30IN NABSRB BLK TIE SILK

## (undated) DEVICE — SUT SILK 0 A306H

## (undated) DEVICE — YANKAUER,POOLE TIP,STERILE,50/CS: Brand: MEDLINE

## (undated) DEVICE — SUT SILK 2-0 A305H

## (undated) DEVICE — CLOSING BUNDLE: Brand: MEDLINE INDUSTRIES, INC.

## (undated) DEVICE — SLEEVE COMPR MD KNEE LEN SGL USE KENDALL SCD

## (undated) DEVICE — PACK PBDS LAPAROTOMY GENERAL

## (undated) DEVICE — STANDARD HYPODERMIC NEEDLE,POLYPROPYLENE HUB: Brand: MONOJECT

## (undated) DEVICE — E-Z CLEAN, NON-STICK, PTFE COATED, ELECTROSURGICAL BLADE ELECTRODE, 2.75 INCH (7 CM): Brand: MEGADYNE

## (undated) DEVICE — LIGHT HANDLE

## (undated) DEVICE — PROVIDES A STERILE INTERFACE BETWEEN THE OPERATING ROOM SURGICAL LAMPS (NON-STERILE) AND THE SURGEON OR NURSE (STERILE).: Brand: STERION®CLAMP COVER FABRIC

## (undated) DEVICE — DRESSING ALG 3.5X10IN TAN CARBOXYMETHYL CELOS

## (undated) DEVICE — SUT PERMA- 3-0 30IN NABSRB BLK TIE SILK

## (undated) DEVICE — SOLUTION  .9 1000ML BTL

## (undated) DEVICE — SLEEVE KENDALL SCD EXPRESS MED

## (undated) DEVICE — 1200CC GUARDIAN II: Brand: GUARDIAN

## (undated) DEVICE — SUT PERMA- 0 30IN NABSRB BLK TIE SILK

## (undated) DEVICE — DRESS WOUND AQUACEL 3.5INX10IN

## (undated) DEVICE — SOLUTION IRRIG 1000ML 0.9% NACL USP BTL

## (undated) NOTE — LETTER
January 9, 2019      [address]  [address] [place] [id number]      Dear Kain Dugan: The following are the results of your recent tests ordered by Jsohua Lee. Please review the list of test results.   Your result is the value

## (undated) NOTE — ED AVS SNAPSHOT
Luis Galdamez   MRN: [id number]    Department:  [facility] Emergency Department   Date of Visit:  8/28/2017           Disclosure     Insurance plans vary and the physician(s) referred by the ER may not be covered by your plan.  Please contact your in If you have been prescribed any medication(s), please fill your prescription right away and begin taking the medication(s) as directed    If the emergency physician has read X-rays, these will be re-interpreted by a radiologist.  If there is a significant

## (undated) NOTE — LETTER
21    Patient: Sachin Cavralho  : 1935 Visit date: 2021    Dear  Jace Rajan MD    Thank you for referring Sachinbrooklynn Carvalho to my practice. Please find my assessment and plan below.        Assessment   Anal polyp  (primary encounter diagnosis) which revealed diffuse diverticulosis. Clinical examination of the rectum including anoscopy reveals there to be a prolapsing anterior midline anal polyp on a stalk. No other external hemorrhoids, fissures, fistula, or abscesses.   There are grade 3 int

## (undated) NOTE — IP AVS SNAPSHOT
[address]            (For Outpatient Use Only) Initial Admit Date: 10/9/2022   Inpt/Obs Admit Date: Inpt: 10/9/22 / Obs: N/A   Discharge Date:    Tito Hercules:  [de-identified]   MRN: [de-identified]   CSN: [id number]   CEID: [id number]        ENCOUNTER  Patient Class: Inpatient Admitting Provider: Nitza Godwin MD Unit: [place] [address] Service: Surgical Attending Provider: Nitza Godwin MD   Bed: 302-A   Visit Type:   Referring Physician: No ref. provider found Billing Flag:    Admit Diagnosis: Hyponatremia [E87.1]      PATIENT  Legal Name:   Florinda Gordon   Legal Sex: Female  Gender ID:              Pref Name:    PCP:  Danielle Clark MD Home: [phone]   Address:  [address] : 1935 (87 yrs) Mobile: [phone]         City/State/Zip: [address] [place] [address], [address] [place] [address] Marital:  Language: [address] [place] [address] Drive: Will SSN4: [id number] Spiritism: Wishes Privacy     Race: White Ethnicity: Non  Or  O*   EMERGENCY CONTACT   Name Relationship Legal Guardian? Home Phone Work Phone Mobile Phone   1. Baby Devi  2.  Man Cos Daughter  Son   No   [phone]     GUARANTOR  Guarantor: Miriam Ashleytracys : 1935 Home Phone: [phone]   Address: [address]  Sex: Female Work Phone:    City/State/Zip: [address] [place] [address]   Rel. to Patient: Self Guarantor ID: [id number]   GUARANTOR EMPLOYER   Employer:  Status: RETIRED     COVERAGE  PRIMARY INSURANCE   Payor: MEDICARE Plan: MEDICARE PART A&B   Group Number:  Insurance Type: INDEMNITY   Subscriber Name: Pattiyaz Vidalhty : 1935   Subscriber ID: [id number] Pt Rel to Subscriber: Self   SECONDARY INSURANCE   Payor: [employer] I* Plan: Mobile INDEMN*   Group Number: [id number] Insurance Type: [address] Name: Patti Vidalhty : 1935   Subscriber ID: [id number] Pt Rel to Subscriber: SELF   TERTIARY INSURANCE   Payor:  Plan: Group Number:  Insurance Type:    Subscriber Name:  Subscriber :    Subscriber ID:  Pt Rel to Subscriber:    Hospital Account Financial Class: Medicare    2022